# Patient Record
Sex: MALE | Race: WHITE | NOT HISPANIC OR LATINO | Employment: OTHER
[De-identification: names, ages, dates, MRNs, and addresses within clinical notes are randomized per-mention and may not be internally consistent; named-entity substitution may affect disease eponyms.]

---

## 2017-03-02 ENCOUNTER — RECORDS - HEALTHEAST (OUTPATIENT)
Dept: ADMINISTRATIVE | Facility: OTHER | Age: 72
End: 2017-03-02

## 2017-03-17 ENCOUNTER — OFFICE VISIT - HEALTHEAST (OUTPATIENT)
Dept: INTERNAL MEDICINE | Facility: CLINIC | Age: 72
End: 2017-03-17

## 2017-03-17 DIAGNOSIS — J01.90 ACUTE SINUSITIS: ICD-10-CM

## 2017-03-17 DIAGNOSIS — S63.501A WRIST SPRAIN, RIGHT, INITIAL ENCOUNTER: ICD-10-CM

## 2017-03-17 ASSESSMENT — MIFFLIN-ST. JEOR: SCORE: 1646.65

## 2017-03-23 ENCOUNTER — RECORDS - HEALTHEAST (OUTPATIENT)
Dept: ADMINISTRATIVE | Facility: OTHER | Age: 72
End: 2017-03-23

## 2017-05-25 ENCOUNTER — RECORDS - HEALTHEAST (OUTPATIENT)
Dept: ADMINISTRATIVE | Facility: OTHER | Age: 72
End: 2017-05-25

## 2017-07-10 ENCOUNTER — OFFICE VISIT - HEALTHEAST (OUTPATIENT)
Dept: FAMILY MEDICINE | Facility: CLINIC | Age: 72
End: 2017-07-10

## 2017-07-10 ENCOUNTER — OFFICE VISIT - HEALTHEAST (OUTPATIENT)
Dept: PODIATRY | Facility: CLINIC | Age: 72
End: 2017-07-10

## 2017-07-10 DIAGNOSIS — G58.9 COMPRESSION NEUROPATHY: ICD-10-CM

## 2017-07-10 DIAGNOSIS — H61.20 CERUMEN IMPACTION: ICD-10-CM

## 2017-07-10 DIAGNOSIS — G57.61 MORTON'S NEUROMA, RIGHT: ICD-10-CM

## 2017-08-22 ENCOUNTER — COMMUNICATION - HEALTHEAST (OUTPATIENT)
Dept: INTERNAL MEDICINE | Facility: CLINIC | Age: 72
End: 2017-08-22

## 2017-08-22 DIAGNOSIS — K21.9 GERD (GASTROESOPHAGEAL REFLUX DISEASE): ICD-10-CM

## 2017-10-04 ENCOUNTER — RECORDS - HEALTHEAST (OUTPATIENT)
Dept: ADMINISTRATIVE | Facility: OTHER | Age: 72
End: 2017-10-04

## 2017-10-23 ENCOUNTER — AMBULATORY - HEALTHEAST (OUTPATIENT)
Dept: SLEEP MEDICINE | Facility: CLINIC | Age: 72
End: 2017-10-23

## 2017-10-23 ENCOUNTER — OFFICE VISIT - HEALTHEAST (OUTPATIENT)
Dept: SLEEP MEDICINE | Facility: CLINIC | Age: 72
End: 2017-10-23

## 2017-10-23 DIAGNOSIS — G47.33 OSA ON CPAP: ICD-10-CM

## 2017-10-23 ASSESSMENT — MIFFLIN-ST. JEOR: SCORE: 1669.33

## 2017-10-30 ENCOUNTER — COMMUNICATION - HEALTHEAST (OUTPATIENT)
Dept: INTERNAL MEDICINE | Facility: CLINIC | Age: 72
End: 2017-10-30

## 2017-10-31 ENCOUNTER — OFFICE VISIT - HEALTHEAST (OUTPATIENT)
Dept: INTERNAL MEDICINE | Facility: CLINIC | Age: 72
End: 2017-10-31

## 2017-10-31 DIAGNOSIS — N20.0 NEPHROLITHIASIS: ICD-10-CM

## 2017-10-31 DIAGNOSIS — G47.33 OBSTRUCTIVE SLEEP APNEA SYNDROME: ICD-10-CM

## 2017-10-31 DIAGNOSIS — E78.2 MIXED HYPERLIPIDEMIA: ICD-10-CM

## 2017-10-31 LAB
CHOLEST SERPL-MCNC: 177 MG/DL
FASTING STATUS PATIENT QL REPORTED: YES
HDLC SERPL-MCNC: 47 MG/DL
LDLC SERPL CALC-MCNC: 110 MG/DL
TRIGL SERPL-MCNC: 98 MG/DL

## 2017-10-31 ASSESSMENT — MIFFLIN-ST. JEOR: SCORE: 1655.14

## 2018-01-04 ENCOUNTER — RECORDS - HEALTHEAST (OUTPATIENT)
Dept: ADMINISTRATIVE | Facility: OTHER | Age: 73
End: 2018-01-04

## 2018-01-31 ENCOUNTER — RECORDS - HEALTHEAST (OUTPATIENT)
Dept: ADMINISTRATIVE | Facility: OTHER | Age: 73
End: 2018-01-31

## 2018-02-08 ENCOUNTER — RECORDS - HEALTHEAST (OUTPATIENT)
Dept: ADMINISTRATIVE | Facility: OTHER | Age: 73
End: 2018-02-08

## 2018-02-19 ENCOUNTER — COMMUNICATION - HEALTHEAST (OUTPATIENT)
Dept: INTERNAL MEDICINE | Facility: CLINIC | Age: 73
End: 2018-02-19

## 2018-02-19 DIAGNOSIS — K21.9 GERD (GASTROESOPHAGEAL REFLUX DISEASE): ICD-10-CM

## 2018-07-27 ENCOUNTER — OFFICE VISIT - HEALTHEAST (OUTPATIENT)
Dept: FAMILY MEDICINE | Facility: CLINIC | Age: 73
End: 2018-07-27

## 2018-07-27 DIAGNOSIS — H61.23 BILATERAL IMPACTED CERUMEN: ICD-10-CM

## 2018-10-30 ENCOUNTER — RECORDS - HEALTHEAST (OUTPATIENT)
Dept: ADMINISTRATIVE | Facility: OTHER | Age: 73
End: 2018-10-30

## 2018-10-31 ENCOUNTER — RECORDS - HEALTHEAST (OUTPATIENT)
Dept: ADMINISTRATIVE | Facility: OTHER | Age: 73
End: 2018-10-31

## 2018-11-05 ENCOUNTER — COMMUNICATION - HEALTHEAST (OUTPATIENT)
Dept: GENERAL RADIOLOGY | Facility: CLINIC | Age: 73
End: 2018-11-05

## 2018-11-06 ENCOUNTER — COMMUNICATION - HEALTHEAST (OUTPATIENT)
Dept: INTERNAL MEDICINE | Facility: CLINIC | Age: 73
End: 2018-11-06

## 2018-11-06 ENCOUNTER — OFFICE VISIT - HEALTHEAST (OUTPATIENT)
Dept: INTERNAL MEDICINE | Facility: CLINIC | Age: 73
End: 2018-11-06

## 2018-11-06 DIAGNOSIS — E78.2 MIXED HYPERLIPIDEMIA: ICD-10-CM

## 2018-11-06 DIAGNOSIS — Z79.899 MEDICATION MANAGEMENT: ICD-10-CM

## 2018-11-06 DIAGNOSIS — G47.33 OBSTRUCTIVE SLEEP APNEA SYNDROME: ICD-10-CM

## 2018-11-06 DIAGNOSIS — Z12.11 SCREEN FOR COLON CANCER: ICD-10-CM

## 2018-11-06 DIAGNOSIS — K21.9 GASTROESOPHAGEAL REFLUX DISEASE WITHOUT ESOPHAGITIS: ICD-10-CM

## 2018-11-06 LAB
ALBUMIN SERPL-MCNC: 4 G/DL (ref 3.5–5)
ALP SERPL-CCNC: 48 U/L (ref 45–120)
ALT SERPL W P-5'-P-CCNC: 13 U/L (ref 0–45)
ANION GAP SERPL CALCULATED.3IONS-SCNC: 9 MMOL/L (ref 5–18)
AST SERPL W P-5'-P-CCNC: 19 U/L (ref 0–40)
BILIRUB SERPL-MCNC: 1 MG/DL (ref 0–1)
BUN SERPL-MCNC: 21 MG/DL (ref 8–28)
CALCIUM SERPL-MCNC: 9.5 MG/DL (ref 8.5–10.5)
CHLORIDE BLD-SCNC: 103 MMOL/L (ref 98–107)
CHOLEST SERPL-MCNC: 167 MG/DL
CO2 SERPL-SCNC: 26 MMOL/L (ref 22–31)
CREAT SERPL-MCNC: 0.9 MG/DL (ref 0.7–1.3)
ERYTHROCYTE [DISTWIDTH] IN BLOOD BY AUTOMATED COUNT: 12.2 % (ref 11–14.5)
FASTING STATUS PATIENT QL REPORTED: YES
GFR SERPL CREATININE-BSD FRML MDRD: >60 ML/MIN/1.73M2
GLUCOSE BLD-MCNC: 92 MG/DL (ref 70–125)
HCT VFR BLD AUTO: 44.7 % (ref 40–54)
HDLC SERPL-MCNC: 46 MG/DL
HGB BLD-MCNC: 15.2 G/DL (ref 14–18)
LDLC SERPL CALC-MCNC: 101 MG/DL
MCH RBC QN AUTO: 29.5 PG (ref 27–34)
MCHC RBC AUTO-ENTMCNC: 34 G/DL (ref 32–36)
MCV RBC AUTO: 87 FL (ref 80–100)
PLATELET # BLD AUTO: 182 THOU/UL (ref 140–440)
PMV BLD AUTO: 8.8 FL (ref 7–10)
POTASSIUM BLD-SCNC: 4.6 MMOL/L (ref 3.5–5)
PROT SERPL-MCNC: 7 G/DL (ref 6–8)
RBC # BLD AUTO: 5.15 MILL/UL (ref 4.4–6.2)
SODIUM SERPL-SCNC: 138 MMOL/L (ref 136–145)
TRIGL SERPL-MCNC: 101 MG/DL
WBC: 7.7 THOU/UL (ref 4–11)

## 2018-11-06 ASSESSMENT — MIFFLIN-ST. JEOR: SCORE: 1659.68

## 2018-11-16 ENCOUNTER — COMMUNICATION - HEALTHEAST (OUTPATIENT)
Dept: INTERNAL MEDICINE | Facility: CLINIC | Age: 73
End: 2018-11-16

## 2018-11-16 DIAGNOSIS — K21.9 GERD (GASTROESOPHAGEAL REFLUX DISEASE): ICD-10-CM

## 2018-11-26 ENCOUNTER — AMBULATORY - HEALTHEAST (OUTPATIENT)
Dept: SLEEP MEDICINE | Facility: CLINIC | Age: 73
End: 2018-11-26

## 2018-11-26 ENCOUNTER — OFFICE VISIT - HEALTHEAST (OUTPATIENT)
Dept: SLEEP MEDICINE | Facility: CLINIC | Age: 73
End: 2018-11-26

## 2018-11-26 DIAGNOSIS — G47.33 OSA ON CPAP: ICD-10-CM

## 2018-11-26 ASSESSMENT — MIFFLIN-ST. JEOR: SCORE: 1677.82

## 2019-02-06 ENCOUNTER — RECORDS - HEALTHEAST (OUTPATIENT)
Dept: ADMINISTRATIVE | Facility: OTHER | Age: 74
End: 2019-02-06

## 2019-02-14 ENCOUNTER — RECORDS - HEALTHEAST (OUTPATIENT)
Dept: ADMINISTRATIVE | Facility: OTHER | Age: 74
End: 2019-02-14

## 2019-03-14 ENCOUNTER — RECORDS - HEALTHEAST (OUTPATIENT)
Dept: ADMINISTRATIVE | Facility: OTHER | Age: 74
End: 2019-03-14

## 2019-04-11 ENCOUNTER — COMMUNICATION - HEALTHEAST (OUTPATIENT)
Dept: OTHER | Facility: CLINIC | Age: 74
End: 2019-04-11

## 2019-04-12 ENCOUNTER — RECORDS - HEALTHEAST (OUTPATIENT)
Dept: ADMINISTRATIVE | Facility: OTHER | Age: 74
End: 2019-04-12

## 2019-04-18 ENCOUNTER — RECORDS - HEALTHEAST (OUTPATIENT)
Dept: ADMINISTRATIVE | Facility: OTHER | Age: 74
End: 2019-04-18

## 2019-05-07 ENCOUNTER — COMMUNICATION - HEALTHEAST (OUTPATIENT)
Dept: SLEEP MEDICINE | Facility: CLINIC | Age: 74
End: 2019-05-07

## 2019-05-07 DIAGNOSIS — G47.33 OSA ON CPAP: ICD-10-CM

## 2019-05-16 ENCOUNTER — AMBULATORY - HEALTHEAST (OUTPATIENT)
Dept: SLEEP MEDICINE | Facility: CLINIC | Age: 74
End: 2019-05-16

## 2019-05-23 ENCOUNTER — COMMUNICATION - HEALTHEAST (OUTPATIENT)
Dept: SLEEP MEDICINE | Facility: CLINIC | Age: 74
End: 2019-05-23

## 2019-06-05 ENCOUNTER — AMBULATORY - HEALTHEAST (OUTPATIENT)
Dept: SLEEP MEDICINE | Facility: CLINIC | Age: 74
End: 2019-06-05

## 2019-06-28 ENCOUNTER — OFFICE VISIT - HEALTHEAST (OUTPATIENT)
Dept: INTERNAL MEDICINE | Facility: CLINIC | Age: 74
End: 2019-06-28

## 2019-06-28 DIAGNOSIS — A69.20 LYME DISEASE: ICD-10-CM

## 2019-06-28 DIAGNOSIS — R53.83 FATIGUE, UNSPECIFIED TYPE: ICD-10-CM

## 2019-06-28 DIAGNOSIS — T73.2XXA FATIGUE DUE TO EXPOSURE, INITIAL ENCOUNTER: ICD-10-CM

## 2019-06-28 LAB
ERYTHROCYTE [DISTWIDTH] IN BLOOD BY AUTOMATED COUNT: 12.5 % (ref 11–14.5)
ERYTHROCYTE [SEDIMENTATION RATE] IN BLOOD BY WESTERGREN METHOD: 14 MM/HR (ref 0–15)
HCT VFR BLD AUTO: 40.8 % (ref 40–54)
HGB BLD-MCNC: 14.1 G/DL (ref 14–18)
MCH RBC QN AUTO: 29.1 PG (ref 27–34)
MCHC RBC AUTO-ENTMCNC: 34.5 G/DL (ref 32–36)
MCV RBC AUTO: 84 FL (ref 80–100)
PLATELET # BLD AUTO: 200 THOU/UL (ref 140–440)
PMV BLD AUTO: 8.3 FL (ref 7–10)
RBC # BLD AUTO: 4.85 MILL/UL (ref 4.4–6.2)
TSH SERPL DL<=0.005 MIU/L-ACNC: 2.49 UIU/ML (ref 0.3–5)
WBC: 4.8 THOU/UL (ref 4–11)

## 2019-06-28 ASSESSMENT — MIFFLIN-ST. JEOR: SCORE: 1677.82

## 2019-07-01 LAB — B BURGDOR IGG+IGM SER QL: 0.1 INDEX VALUE

## 2019-07-02 ENCOUNTER — COMMUNICATION - HEALTHEAST (OUTPATIENT)
Dept: INTERNAL MEDICINE | Facility: CLINIC | Age: 74
End: 2019-07-02

## 2019-08-14 ENCOUNTER — COMMUNICATION - HEALTHEAST (OUTPATIENT)
Dept: INTERNAL MEDICINE | Facility: CLINIC | Age: 74
End: 2019-08-14

## 2019-08-14 DIAGNOSIS — K21.9 GERD (GASTROESOPHAGEAL REFLUX DISEASE): ICD-10-CM

## 2019-08-28 ENCOUNTER — OFFICE VISIT - HEALTHEAST (OUTPATIENT)
Dept: SLEEP MEDICINE | Facility: CLINIC | Age: 74
End: 2019-08-28

## 2019-08-28 DIAGNOSIS — G47.33 OSA ON CPAP: ICD-10-CM

## 2019-08-28 ASSESSMENT — MIFFLIN-ST. JEOR: SCORE: 1686.89

## 2019-09-24 ENCOUNTER — OFFICE VISIT - HEALTHEAST (OUTPATIENT)
Dept: INTERNAL MEDICINE | Facility: CLINIC | Age: 74
End: 2019-09-24

## 2019-09-24 ENCOUNTER — AMBULATORY - HEALTHEAST (OUTPATIENT)
Dept: INTERNAL MEDICINE | Facility: CLINIC | Age: 74
End: 2019-09-24

## 2019-09-24 DIAGNOSIS — H61.21 RIGHT EAR IMPACTED CERUMEN: ICD-10-CM

## 2019-09-24 DIAGNOSIS — H91.13 PRESBYCUSIS OF BOTH EARS: ICD-10-CM

## 2019-09-24 ASSESSMENT — MIFFLIN-ST. JEOR: SCORE: 1682.36

## 2019-10-17 ENCOUNTER — OFFICE VISIT - HEALTHEAST (OUTPATIENT)
Dept: AUDIOLOGY | Facility: CLINIC | Age: 74
End: 2019-10-17

## 2019-10-17 DIAGNOSIS — H93.13 TINNITUS OF BOTH EARS: ICD-10-CM

## 2019-10-17 DIAGNOSIS — H90.3 SENSORINEURAL HEARING LOSS, BILATERAL: ICD-10-CM

## 2019-11-06 ENCOUNTER — COMMUNICATION - HEALTHEAST (OUTPATIENT)
Dept: INTERNAL MEDICINE | Facility: CLINIC | Age: 74
End: 2019-11-06

## 2019-11-06 ENCOUNTER — OFFICE VISIT - HEALTHEAST (OUTPATIENT)
Dept: INTERNAL MEDICINE | Facility: CLINIC | Age: 74
End: 2019-11-06

## 2019-11-06 DIAGNOSIS — K21.9 GASTROESOPHAGEAL REFLUX DISEASE WITHOUT ESOPHAGITIS: ICD-10-CM

## 2019-11-06 DIAGNOSIS — E78.2 MIXED HYPERLIPIDEMIA: ICD-10-CM

## 2019-11-06 DIAGNOSIS — G47.33 OBSTRUCTIVE SLEEP APNEA SYNDROME: ICD-10-CM

## 2019-11-06 LAB
ALBUMIN SERPL-MCNC: 4.1 G/DL (ref 3.5–5)
ALP SERPL-CCNC: 52 U/L (ref 45–120)
ALT SERPL W P-5'-P-CCNC: 11 U/L (ref 0–45)
ANION GAP SERPL CALCULATED.3IONS-SCNC: 10 MMOL/L (ref 5–18)
AST SERPL W P-5'-P-CCNC: 21 U/L (ref 0–40)
BILIRUB SERPL-MCNC: 0.8 MG/DL (ref 0–1)
BUN SERPL-MCNC: 18 MG/DL (ref 8–28)
CALCIUM SERPL-MCNC: 9 MG/DL (ref 8.5–10.5)
CHLORIDE BLD-SCNC: 106 MMOL/L (ref 98–107)
CHOLEST SERPL-MCNC: 173 MG/DL
CO2 SERPL-SCNC: 25 MMOL/L (ref 22–31)
CREAT SERPL-MCNC: 1.02 MG/DL (ref 0.7–1.3)
ERYTHROCYTE [DISTWIDTH] IN BLOOD BY AUTOMATED COUNT: 12.9 % (ref 11–14.5)
FASTING STATUS PATIENT QL REPORTED: YES
GFR SERPL CREATININE-BSD FRML MDRD: >60 ML/MIN/1.73M2
GLUCOSE BLD-MCNC: 88 MG/DL (ref 70–125)
HCT VFR BLD AUTO: 43.7 % (ref 40–54)
HDLC SERPL-MCNC: 38 MG/DL
HGB BLD-MCNC: 14.8 G/DL (ref 14–18)
LDLC SERPL CALC-MCNC: 108 MG/DL
MCH RBC QN AUTO: 28.2 PG (ref 27–34)
MCHC RBC AUTO-ENTMCNC: 33.9 G/DL (ref 32–36)
MCV RBC AUTO: 83 FL (ref 80–100)
PLATELET # BLD AUTO: 186 THOU/UL (ref 140–440)
PMV BLD AUTO: 9 FL (ref 7–10)
POTASSIUM BLD-SCNC: 4.6 MMOL/L (ref 3.5–5)
PROT SERPL-MCNC: 7.4 G/DL (ref 6–8)
RBC # BLD AUTO: 5.25 MILL/UL (ref 4.4–6.2)
SODIUM SERPL-SCNC: 141 MMOL/L (ref 136–145)
TRIGL SERPL-MCNC: 137 MG/DL
WBC: 5 THOU/UL (ref 4–11)

## 2019-11-06 ASSESSMENT — MIFFLIN-ST. JEOR: SCORE: 1678.4

## 2019-12-10 ENCOUNTER — OFFICE VISIT - HEALTHEAST (OUTPATIENT)
Dept: INTERNAL MEDICINE | Facility: CLINIC | Age: 74
End: 2019-12-10

## 2019-12-10 DIAGNOSIS — G47.33 OSA ON CPAP: ICD-10-CM

## 2019-12-10 DIAGNOSIS — K21.9 GERD WITH STRICTURE: ICD-10-CM

## 2019-12-10 DIAGNOSIS — R39.11 BENIGN PROSTATIC HYPERPLASIA WITH URINARY HESITANCY: ICD-10-CM

## 2019-12-10 DIAGNOSIS — N40.1 BENIGN PROSTATIC HYPERPLASIA WITH URINARY HESITANCY: ICD-10-CM

## 2019-12-10 DIAGNOSIS — E78.5 DYSLIPIDEMIA: ICD-10-CM

## 2019-12-10 DIAGNOSIS — K22.2 GERD WITH STRICTURE: ICD-10-CM

## 2019-12-10 ASSESSMENT — MIFFLIN-ST. JEOR: SCORE: 1678.4

## 2020-02-06 ENCOUNTER — HOSPITAL ENCOUNTER (OUTPATIENT)
Dept: CT IMAGING | Facility: CLINIC | Age: 75
Discharge: HOME | End: 2020-02-06
Attending: INTERNAL MEDICINE

## 2020-02-06 ENCOUNTER — OFFICE VISIT - HEALTHEAST (OUTPATIENT)
Dept: INTERNAL MEDICINE | Facility: CLINIC | Age: 75
End: 2020-02-06

## 2020-02-06 ENCOUNTER — AMBULATORY - HEALTHEAST (OUTPATIENT)
Dept: INTERNAL MEDICINE | Facility: CLINIC | Age: 75
End: 2020-02-06

## 2020-02-06 ENCOUNTER — COMMUNICATION - HEALTHEAST (OUTPATIENT)
Dept: INTERNAL MEDICINE | Facility: CLINIC | Age: 75
End: 2020-02-06

## 2020-02-06 DIAGNOSIS — R10.9 FLANK PAIN: ICD-10-CM

## 2020-02-06 DIAGNOSIS — K21.9 GERD WITH STRICTURE: ICD-10-CM

## 2020-02-06 DIAGNOSIS — R10.11 ABDOMINAL PAIN, RIGHT UPPER QUADRANT: ICD-10-CM

## 2020-02-06 DIAGNOSIS — K22.2 GERD WITH STRICTURE: ICD-10-CM

## 2020-02-06 DIAGNOSIS — N20.0 CALCULUS OF KIDNEY: ICD-10-CM

## 2020-02-06 LAB
ALBUMIN SERPL-MCNC: 4.4 G/DL (ref 3.5–5)
ALBUMIN UR-MCNC: NEGATIVE MG/DL
ALP SERPL-CCNC: 56 U/L (ref 45–120)
ALT SERPL W P-5'-P-CCNC: 18 U/L (ref 0–45)
ANION GAP SERPL CALCULATED.3IONS-SCNC: 12 MMOL/L (ref 5–18)
APPEARANCE UR: CLEAR
AST SERPL W P-5'-P-CCNC: 28 U/L (ref 0–40)
BILIRUB SERPL-MCNC: 0.7 MG/DL (ref 0–1)
BILIRUB UR QL STRIP: NEGATIVE
BUN SERPL-MCNC: 17 MG/DL (ref 8–28)
CALCIUM SERPL-MCNC: 9.5 MG/DL (ref 8.5–10.5)
CHLORIDE BLD-SCNC: 104 MMOL/L (ref 98–107)
CO2 SERPL-SCNC: 24 MMOL/L (ref 22–31)
COLOR UR AUTO: YELLOW
CREAT SERPL-MCNC: 1.05 MG/DL (ref 0.7–1.3)
ERYTHROCYTE [DISTWIDTH] IN BLOOD BY AUTOMATED COUNT: 12.8 % (ref 11–14.5)
GFR SERPL CREATININE-BSD FRML MDRD: >60 ML/MIN/1.73M2
GLUCOSE BLD-MCNC: 97 MG/DL (ref 70–125)
GLUCOSE UR STRIP-MCNC: NEGATIVE MG/DL
HCT VFR BLD AUTO: 43.5 % (ref 40–54)
HGB BLD-MCNC: 15.2 G/DL (ref 14–18)
HGB UR QL STRIP: NEGATIVE
KETONES UR STRIP-MCNC: NEGATIVE MG/DL
LEUKOCYTE ESTERASE UR QL STRIP: NEGATIVE
MCH RBC QN AUTO: 29.7 PG (ref 27–34)
MCHC RBC AUTO-ENTMCNC: 34.8 G/DL (ref 32–36)
MCV RBC AUTO: 85 FL (ref 80–100)
NITRATE UR QL: NEGATIVE
PH UR STRIP: 6.5 [PH] (ref 5–8)
PLATELET # BLD AUTO: 190 THOU/UL (ref 140–440)
PMV BLD AUTO: 8.7 FL (ref 7–10)
POTASSIUM BLD-SCNC: 4.2 MMOL/L (ref 3.5–5)
PROT SERPL-MCNC: 7.6 G/DL (ref 6–8)
RBC # BLD AUTO: 5.11 MILL/UL (ref 4.4–6.2)
SODIUM SERPL-SCNC: 140 MMOL/L (ref 136–145)
SP GR UR STRIP: 1.02 (ref 1–1.03)
UROBILINOGEN UR STRIP-ACNC: NORMAL
WBC: 6.3 THOU/UL (ref 4–11)

## 2020-02-06 ASSESSMENT — MIFFLIN-ST. JEOR: SCORE: 1655.72

## 2020-03-02 ENCOUNTER — OFFICE VISIT - HEALTHEAST (OUTPATIENT)
Dept: INTERNAL MEDICINE | Facility: CLINIC | Age: 75
End: 2020-03-02

## 2020-03-02 DIAGNOSIS — Z86.0100 HISTORY OF COLONIC POLYPS: ICD-10-CM

## 2020-03-02 DIAGNOSIS — R10.9 FLANK PAIN: ICD-10-CM

## 2020-03-02 DIAGNOSIS — N20.0 CALCULUS OF KIDNEY: ICD-10-CM

## 2020-03-02 DIAGNOSIS — R19.4 CHANGE IN BOWEL HABIT: ICD-10-CM

## 2020-03-02 ASSESSMENT — MIFFLIN-ST. JEOR: SCORE: 1682.93

## 2020-03-04 ENCOUNTER — RECORDS - HEALTHEAST (OUTPATIENT)
Dept: ADMINISTRATIVE | Facility: OTHER | Age: 75
End: 2020-03-04

## 2020-03-12 ENCOUNTER — RECORDS - HEALTHEAST (OUTPATIENT)
Dept: ADMINISTRATIVE | Facility: OTHER | Age: 75
End: 2020-03-12

## 2020-05-21 ENCOUNTER — RECORDS - HEALTHEAST (OUTPATIENT)
Dept: ADMINISTRATIVE | Facility: OTHER | Age: 75
End: 2020-05-21

## 2020-06-09 ENCOUNTER — RECORDS - HEALTHEAST (OUTPATIENT)
Dept: ADMINISTRATIVE | Facility: OTHER | Age: 75
End: 2020-06-09

## 2020-07-20 ENCOUNTER — COMMUNICATION - HEALTHEAST (OUTPATIENT)
Dept: INTERNAL MEDICINE | Facility: CLINIC | Age: 75
End: 2020-07-20

## 2020-08-22 ENCOUNTER — COMMUNICATION - HEALTHEAST (OUTPATIENT)
Dept: INTERNAL MEDICINE | Facility: CLINIC | Age: 75
End: 2020-08-22

## 2020-08-22 DIAGNOSIS — K21.9 GERD (GASTROESOPHAGEAL REFLUX DISEASE): ICD-10-CM

## 2020-08-24 ENCOUNTER — COMMUNICATION - HEALTHEAST (OUTPATIENT)
Dept: INTERNAL MEDICINE | Facility: CLINIC | Age: 75
End: 2020-08-24

## 2020-08-24 DIAGNOSIS — K21.9 GERD (GASTROESOPHAGEAL REFLUX DISEASE): ICD-10-CM

## 2020-08-25 ENCOUNTER — COMMUNICATION - HEALTHEAST (OUTPATIENT)
Dept: INTERNAL MEDICINE | Facility: CLINIC | Age: 75
End: 2020-08-25

## 2020-08-25 DIAGNOSIS — K21.9 GERD (GASTROESOPHAGEAL REFLUX DISEASE): ICD-10-CM

## 2020-09-28 RX ORDER — TAMSULOSIN HYDROCHLORIDE 0.4 MG/1
0.4 CAPSULE ORAL AT BEDTIME
COMMUNITY
End: 2023-01-31

## 2020-09-28 NOTE — PROGRESS NOTES
"Rich Musa is a 75 year old male who is being evaluated via a billable video visit.      The patient has been notified of following:     \"This video visit will be conducted via a call between you and your physician/provider. We have found that certain health care needs can be provided without the need for an in-person physical exam.  This service lets us provide the care you need with a video conversation.  If a prescription is necessary we can send it directly to your pharmacy.  If lab work is needed we can place an order for that and you can then stop by our lab to have the test done at a later time.    Video visits are billed at different rates depending on your insurance coverage.  Please reach out to your insurance provider with any questions.    If during the course of the call the physician/provider feels a video visit is not appropriate, you will not be charged for this service.\"    Patient has given verbal consent for Video visit? Yes  How would you like to obtain your AVS? MyChart  If you are dropped from the video visit, the video invite should be resent to: Send to e-mail at: [email or web address]  Will anyone else be joining your video visit? No     Video-Visit Details    Type of service:  Video Visit    Originating Location (pt. Location): Home    Distant Location (provider location):  [facility]     Platform used for Video Visit: Mia Dolan CMA on 9/28/2020 at 2:25 PM    Thank you for the opportunity to participate in the care of Rich Musa.     He is a 75 year old y/o male patient who comes to the sleep medicine clinic for follow up.  Patient states that he continues to do well on CPAP therapy.  He would like to keep pressures of same.     Assessment and Plan:  In summary Rich Musa is a 75 year old year old male who is here for annual follow-up.    1. Obstructive sleep apnea  I congratulated patient on his excellent CPAP usage.  I will write " him a prescription so that he can continue to get supplies from [facility].  - COMPREHENSIVE DME       Compliance Download data for 30 days:  Pressure setting: APAP 13.5-15 CWP  95% pressure: 14.6 CWP  Residual AHI: 1.4 events per hour  Leak: Minimal  Compliance: 100%  Mask Tolerance: Good  Skin irritation: None  DME: [facility]    Sleep-Wake Cycle:    The patient likes to initiate sleep at around 10-10:30 PM with a sleep latency of 10-15 minutes. The patient has 0 nocturnal awakenings. Final wake up time is around 4-6 AM.    Past Medical History:   Diagnosis Date     BPH (benign prostatic hyperplasia)      BRYN (obstructive sleep apnea)        History reviewed. No pertinent surgical history.    Social History     Socioeconomic History     Marital status:      Spouse name: Not on file     Number of children: Not on file     Years of education: Not on file     Highest education level: Not on file   Occupational History     Not on file   Social Needs     Financial resource strain: Not on file     Food insecurity     Worry: Not on file     Inability: Not on file     Transportation needs     Medical: Not on file     Non-medical: Not on file   Tobacco Use     Smoking status: Never Smoker     Smokeless tobacco: Never Used   Substance and Sexual Activity     Alcohol use: Not on file     Drug use: Not on file     Sexual activity: Not on file   Lifestyle     Physical activity     Days per week: Not on file     Minutes per session: Not on file     Stress: Not on file   Relationships     Social connections     Talks on phone: Not on file     Gets together: Not on file     Attends Religion service: Not on file     Active member of club or organization: Not on file     Attends meetings of clubs or organizations: Not on file     Relationship status: Not on file     Intimate partner violence     Fear of current or ex partner: Not on file     Emotionally abused: Not on file     Physically abused: Not on file      Forced sexual activity: Not on file   Other Topics Concern     Not on file   Social History Narrative     Not on file       Current Outpatient Medications   Medication Sig Dispense Refill     Calcium Citrate-Vitamin D (CALCIUM CITRATE +D PO)        Multiple Vitamins-Minerals (MULTIVITAMIN ADULT PO) Take 1 tablet by mouth       omeprazole (PRILOSEC) 20 MG DR capsule        tamsulosin (FLOMAX) 0.4 MG capsule Take 0.4 mg by mouth         Allergies   Allergen Reactions     Sulfa Drugs        No flowsheet data found.    Physical Exam:  There were no vitals taken for this visit.  BMI:There is no height or weight on file to calculate BMI.   GEN: NAD,  Psych: normal mood, normal affect    Labs/Studies:    I reviewed the efficacy and compliance report from his device. Data summarized on the HPI and the PAP compliance flow sheet.     Patient verbalized understanding of these issues, agrees with the plan and all questions were answered today. Patient was given an opportuntity to voice any other symptoms or concerns not listed above. Patient did not have any other symptoms or concerns.      David Mathews DO  Board Certified in Internal Medicine and Sleep Medicine    (Note created with Dragon voice recognition and unintended spelling errors and word substitutions may occur)     I spent a total of 15 minutes of face-to-face encounter and in preparation for this clinic visit.    Audio and visual devices were used for this virtual clinic visit with permission from patient.

## 2020-09-29 ENCOUNTER — VIRTUAL VISIT (OUTPATIENT)
Dept: SLEEP MEDICINE | Facility: CLINIC | Age: 75
End: 2020-09-29
Payer: COMMERCIAL

## 2020-09-29 DIAGNOSIS — G47.33 OBSTRUCTIVE SLEEP APNEA: Primary | ICD-10-CM

## 2020-09-29 PROCEDURE — 99213 OFFICE O/P EST LOW 20 MIN: CPT | Mod: 95 | Performed by: INTERNAL MEDICINE

## 2020-10-05 ENCOUNTER — OFFICE VISIT - HEALTHEAST (OUTPATIENT)
Dept: INTERNAL MEDICINE | Facility: CLINIC | Age: 75
End: 2020-10-05

## 2020-10-05 DIAGNOSIS — H61.21 IMPACTED CERUMEN OF RIGHT EAR: ICD-10-CM

## 2020-10-05 ASSESSMENT — MIFFLIN-ST. JEOR: SCORE: 1655.72

## 2020-11-10 ENCOUNTER — OFFICE VISIT - HEALTHEAST (OUTPATIENT)
Dept: INTERNAL MEDICINE | Facility: CLINIC | Age: 75
End: 2020-11-10

## 2020-11-10 DIAGNOSIS — M72.2 PLANTAR FASCIITIS: ICD-10-CM

## 2020-11-10 DIAGNOSIS — M76.892 ENTHESOPATHY OF LEFT HIP REGION: ICD-10-CM

## 2020-11-10 DIAGNOSIS — Z00.00 ENCOUNTER FOR MEDICARE ANNUAL WELLNESS EXAM: ICD-10-CM

## 2020-11-10 DIAGNOSIS — G47.33 OSA ON CPAP: ICD-10-CM

## 2020-11-10 DIAGNOSIS — N40.1 BENIGN PROSTATIC HYPERPLASIA WITH URINARY HESITANCY: ICD-10-CM

## 2020-11-10 DIAGNOSIS — R39.11 BENIGN PROSTATIC HYPERPLASIA WITH URINARY HESITANCY: ICD-10-CM

## 2020-11-10 DIAGNOSIS — K22.2 GERD WITH STRICTURE: ICD-10-CM

## 2020-11-10 DIAGNOSIS — K21.9 GERD WITH STRICTURE: ICD-10-CM

## 2020-11-10 DIAGNOSIS — E78.5 DYSLIPIDEMIA: ICD-10-CM

## 2020-11-10 LAB
ALBUMIN SERPL-MCNC: 4.3 G/DL (ref 3.5–5)
ALBUMIN UR-MCNC: NEGATIVE MG/DL
ALP SERPL-CCNC: 52 U/L (ref 45–120)
ALT SERPL W P-5'-P-CCNC: 17 U/L (ref 0–45)
ANION GAP SERPL CALCULATED.3IONS-SCNC: 11 MMOL/L (ref 5–18)
APPEARANCE UR: CLEAR
AST SERPL W P-5'-P-CCNC: 24 U/L (ref 0–40)
BILIRUB SERPL-MCNC: 0.5 MG/DL (ref 0–1)
BILIRUB UR QL STRIP: NEGATIVE
BUN SERPL-MCNC: 16 MG/DL (ref 8–28)
CALCIUM SERPL-MCNC: 9.4 MG/DL (ref 8.5–10.5)
CHLORIDE BLD-SCNC: 105 MMOL/L (ref 98–107)
CHOLEST SERPL-MCNC: 175 MG/DL
CO2 SERPL-SCNC: 25 MMOL/L (ref 22–31)
COLOR UR AUTO: YELLOW
CREAT SERPL-MCNC: 1 MG/DL (ref 0.7–1.3)
ERYTHROCYTE [DISTWIDTH] IN BLOOD BY AUTOMATED COUNT: 12 % (ref 11–14.5)
FASTING STATUS PATIENT QL REPORTED: YES
GFR SERPL CREATININE-BSD FRML MDRD: >60 ML/MIN/1.73M2
GLUCOSE BLD-MCNC: 98 MG/DL (ref 70–125)
GLUCOSE UR STRIP-MCNC: NEGATIVE MG/DL
HCT VFR BLD AUTO: 45.1 % (ref 40–54)
HDLC SERPL-MCNC: 43 MG/DL
HGB BLD-MCNC: 15.4 G/DL (ref 14–18)
HGB UR QL STRIP: NEGATIVE
KETONES UR STRIP-MCNC: NEGATIVE MG/DL
LDLC SERPL CALC-MCNC: 108 MG/DL
LEUKOCYTE ESTERASE UR QL STRIP: NEGATIVE
MCH RBC QN AUTO: 29.8 PG (ref 27–34)
MCHC RBC AUTO-ENTMCNC: 34.1 G/DL (ref 32–36)
MCV RBC AUTO: 87 FL (ref 80–100)
NITRATE UR QL: NEGATIVE
PH UR STRIP: 7 [PH] (ref 5–8)
PLATELET # BLD AUTO: 179 THOU/UL (ref 140–440)
PMV BLD AUTO: 8.7 FL (ref 7–10)
POTASSIUM BLD-SCNC: 4.4 MMOL/L (ref 3.5–5)
PROT SERPL-MCNC: 7.6 G/DL (ref 6–8)
RBC # BLD AUTO: 5.16 MILL/UL (ref 4.4–6.2)
SODIUM SERPL-SCNC: 141 MMOL/L (ref 136–145)
SP GR UR STRIP: 1.02 (ref 1–1.03)
TRIGL SERPL-MCNC: 119 MG/DL
UROBILINOGEN UR STRIP-ACNC: NORMAL
WBC: 4.4 THOU/UL (ref 4–11)

## 2020-11-10 ASSESSMENT — ANXIETY QUESTIONNAIRES
2. NOT BEING ABLE TO STOP OR CONTROL WORRYING: NOT AT ALL
1. FEELING NERVOUS, ANXIOUS, OR ON EDGE: NOT AT ALL

## 2020-11-10 ASSESSMENT — MIFFLIN-ST. JEOR: SCORE: 1664.79

## 2020-11-12 ENCOUNTER — COMMUNICATION - HEALTHEAST (OUTPATIENT)
Dept: INTERNAL MEDICINE | Facility: CLINIC | Age: 75
End: 2020-11-12

## 2020-11-12 DIAGNOSIS — E78.00 HYPERCHOLESTEROLEMIA: ICD-10-CM

## 2020-11-12 DIAGNOSIS — E78.5 DYSLIPIDEMIA: ICD-10-CM

## 2020-11-22 ENCOUNTER — HEALTH MAINTENANCE LETTER (OUTPATIENT)
Age: 75
End: 2020-11-22

## 2021-02-02 ENCOUNTER — AMBULATORY - HEALTHEAST (OUTPATIENT)
Dept: INTERNAL MEDICINE | Facility: CLINIC | Age: 76
End: 2021-02-02

## 2021-02-02 ENCOUNTER — AMBULATORY - HEALTHEAST (OUTPATIENT)
Dept: LAB | Facility: CLINIC | Age: 76
End: 2021-02-02

## 2021-02-02 DIAGNOSIS — E78.00 HYPERCHOLESTEROLEMIA: ICD-10-CM

## 2021-02-02 DIAGNOSIS — E78.5 DYSLIPIDEMIA: ICD-10-CM

## 2021-02-02 LAB
CHOLEST SERPL-MCNC: 150 MG/DL
FASTING STATUS PATIENT QL REPORTED: YES
HDLC SERPL-MCNC: 42 MG/DL
LDLC SERPL CALC-MCNC: 80 MG/DL
TRIGL SERPL-MCNC: 141 MG/DL

## 2021-02-03 ENCOUNTER — COMMUNICATION - HEALTHEAST (OUTPATIENT)
Dept: INTERNAL MEDICINE | Facility: CLINIC | Age: 76
End: 2021-02-03

## 2021-03-18 ENCOUNTER — RECORDS - HEALTHEAST (OUTPATIENT)
Dept: ADMINISTRATIVE | Facility: OTHER | Age: 76
End: 2021-03-18

## 2021-04-02 ENCOUNTER — RECORDS - HEALTHEAST (OUTPATIENT)
Dept: ADMINISTRATIVE | Facility: OTHER | Age: 76
End: 2021-04-02

## 2021-04-28 ENCOUNTER — AMBULATORY - HEALTHEAST (OUTPATIENT)
Dept: INTERNAL MEDICINE | Facility: CLINIC | Age: 76
End: 2021-04-28

## 2021-04-28 DIAGNOSIS — Z20.822 COVID-19 RULED OUT: ICD-10-CM

## 2021-04-30 ENCOUNTER — AMBULATORY - HEALTHEAST (OUTPATIENT)
Dept: LAB | Facility: CLINIC | Age: 76
End: 2021-04-30

## 2021-04-30 DIAGNOSIS — Z20.822 COVID-19 RULED OUT: ICD-10-CM

## 2021-05-01 LAB
SARS-COV-2 PCR COMMENT: NORMAL
SARS-COV-2 RNA SPEC QL NAA+PROBE: NEGATIVE
SARS-COV-2 VIRUS SPECIMEN SOURCE: NORMAL

## 2021-05-02 ENCOUNTER — COMMUNICATION - HEALTHEAST (OUTPATIENT)
Dept: SCHEDULING | Facility: CLINIC | Age: 76
End: 2021-05-02

## 2021-05-03 ENCOUNTER — COMMUNICATION - HEALTHEAST (OUTPATIENT)
Dept: INTERNAL MEDICINE | Facility: CLINIC | Age: 76
End: 2021-05-03

## 2021-05-24 ENCOUNTER — RECORDS - HEALTHEAST (OUTPATIENT)
Dept: ADMINISTRATIVE | Facility: OTHER | Age: 76
End: 2021-05-24

## 2021-05-27 NOTE — TELEPHONE ENCOUNTER
Patient had initiated transfer to [facility] from [facility] for his treatment of BRYN with a Cpap machine and supplies. He previously had an office visit with Dr Dave in November 2018 and now would like a new Cpap machine when he is eligible on 5/9/2019. I messaged Dr. Reyes to see if he recommended the patient to be seen again to get an updated prescription. Per Dr. Reyes when the patient is ready to receive the new machine he can call and request a prescription to be written by him. He will need the pressures and mask interface. I let the patient know this and he understood. He will call when he gets back from vacation in the middle of May. Per Dr. Dave's notes from his last visit patients pressures are 13.5 - 15 cm H2O. I confirmed with the patient that those are still correct. He is also using a Full Face mask (Mirage Quattro, Medium). Once [facility] receives the new prescription we will contact the patient to be set up on a new machine.

## 2021-05-28 NOTE — TELEPHONE ENCOUNTER
Attempted to reach Rich today 5/16/2019. No answer.   Left detailed message letting him know that [facility] now has his replacement device order.

## 2021-05-28 NOTE — TELEPHONE ENCOUNTER
Please inform patient and [facility] that replacement device order is now in.  Shad Reyes MD  5:18 PM, 5/15/2019

## 2021-05-28 NOTE — TELEPHONE ENCOUNTER
Last visit: 11/26/18, was advised to return in 1 year. Please review and advise.    Dulce Chambers CMA 5/8/2019 11:24 AM

## 2021-05-28 NOTE — PROGRESS NOTES
Order for Durable Medical Equipment was processed and equipment ordered.     DME provider: [facility]    Date Faxed: 5/16/2019    Ordering Provider: Shad Reyes MD    PAP Order Type: Replacement Device Order    Fax Number: IN HOUSE DME: [facility]

## 2021-05-28 NOTE — TELEPHONE ENCOUNTER
Dr Reyes    Patient had office visit with Dr Dave in November 2018.      Patient would like to replace current CPAP machine soon.    Patient requests new script for CPAP.   Patient DME is now [facility].    Thank you.

## 2021-05-29 NOTE — PROGRESS NOTES
Patient was offered choice of vendor and chose [facility]ending sale to [facility].  Patient Rich Musa was set up at [facility] on 6/5/19. Patient received a Resmed [id number] Auto. Pressures were set at 13.5-15.   Patient s ramp is 6 cm H2O for off and FLEX/EPR is EPR 2.  Patient received a Resmed Mask name: Airfit F30  FFM Size med, heated tubing and heated humidifier.    Jayee Her

## 2021-05-29 NOTE — TELEPHONE ENCOUNTER
Please fax this patient's CPAP Rx to the DME below:  DME: [facility]  Need New Supplies Or A New Machine? New machine    Rich states that [facility] hasn't received this Rx yet.     Thank you.

## 2021-05-30 VITALS — BODY MASS INDEX: 29.92 KG/M2 | HEIGHT: 69 IN | WEIGHT: 202 LBS

## 2021-05-30 NOTE — PROGRESS NOTES
"  Office Visit - Follow Up   Rich Musa   74 y.o. male    Date of Visit: 6/28/2019    Chief Complaint   Patient presents with     Generalized Body Aches     Body aches x 3 weeks, just not feeling good, feverish, knees ache, concerned about Lymes disease        Assessment and Plan   1. Lyme disease    - HM2(CBC w/o Differential)  - Erythrocyte Sedimentation Rate  - Lyme Antibody Cascade    2. Fatigue due to exposure, initial encounter    - HM2(CBC w/o Differential)  - Erythrocyte Sedimentation Rate  - Lyme Antibody Cascade    3. Fatigue, unspecified type  I told him we should check these laboratory studies and exclude any active Lyme disease if we can.  He is had no tick bite exposure.  In 2014 he also had no known exposure but had serologically suggestive Lyme's disease.  - Thyroid Stimulating Hormone (TSH)          No follow-ups on file.     History of Present Illness   This 74 y.o. old comes in with a 4-week history of feeling achy and feverish.  He has not checked his temperature.  He reports no serious chills.  He reports that his joints diffusely and he has had some headaches.  His knees were aching quite a bit not taking much at all.  He is in his backyard possibly exposed to ticks but has not had any known tick bite.  He has not pulled any ticks off of his body yet.  He does however report that in 2014 he had Lyme's disease.  I reviewed these records and he had a story suggestive of Lyme's but no ECM rash.  His serology was borderline positive for active Lyme disease.    Review of Systems: A comprehensive review of systems was negative except as noted.     Medications, Allergies and Problem List   Reviewed, reconciled and updated  Post Discharge Medication Reconciliation Status:      Physical Exam   General Appearance:       /70 (Patient Site: Left Arm, Patient Position: Sitting, Cuff Size: Adult Large)   Pulse 82   Ht 5' 9.25\" (1.759 m)   Wt 208 lb (94.3 kg)   SpO2 93%   BMI 30.49 kg/m  "     His hands and knees and ankles have no obvious synovitis.  His skin shows no rash.     Additional Information   Current Outpatient Medications   Medication Sig Dispense Refill     multivit with minerals/lutein (MULTIVITAMIN 50 PLUS ORAL) Take 1 tablet by mouth daily.       MULTIVITAMIN ORAL Take 1 tablet by mouth daily. Tablet.       omeprazole (PRILOSEC) 20 MG capsule Take 1 capsule (20 mg total) by mouth daily. 90 capsule 2     tamsulosin (FLOMAX) 0.4 mg Cp24 Take 0.4 mg by mouth.       No current facility-administered medications for this visit.      Allergies   Allergen Reactions     Augmentin [Amoxicillin-Pot Clavulanate] Diarrhea     Codeine-Guaifenesin      Sulfa (Sulfonamide Antibiotics)      Social History     Tobacco Use     Smoking status: Never Smoker     Smokeless tobacco: Never Used   Substance Use Topics     Alcohol use: No     Comment: social     Drug use: No       Review and/or order of clinical lab tests:  Review and/or order of radiology tests:  Review and/or order of medicine tests:  Discussion of test results with performing physician:  Decision to obtain old records and/or obtain history from someone other than the patient:  Review and summarization of old records and/or obtaining history from someone other than the patient and.or discussion of case with another health care provider:  Independent visualization of image, tracing or specimen itself:    Time: total time spent with the patient was 15 minutes of which >50% was spent in counseling and coordination of care     Ryan Santacruz MD

## 2021-05-31 ENCOUNTER — RECORDS - HEALTHEAST (OUTPATIENT)
Dept: ADMINISTRATIVE | Facility: CLINIC | Age: 76
End: 2021-05-31

## 2021-05-31 VITALS — HEIGHT: 69 IN | WEIGHT: 203 LBS | BODY MASS INDEX: 30.07 KG/M2

## 2021-05-31 VITALS — BODY MASS INDEX: 29.95 KG/M2 | WEIGHT: 202.8 LBS

## 2021-05-31 VITALS — WEIGHT: 207 LBS | HEIGHT: 69 IN | BODY MASS INDEX: 30.66 KG/M2

## 2021-05-31 NOTE — PROGRESS NOTES
"He is a 74 y.o. y/o male patient who comes to the sleep medicine clinic for PAP therapy follow up.    [facility], DME: [facility]    He was diagnosed with severe BRYN (AHI 32/hr on 2005 PSG) and has been using nPAP therapy.  Most recent PAP settings: 13.5 cmH2O - 15 cmH2O withFFM    His symptoms are improved since he started using CPAP. He feels more refreshed when he wakes up.    He denies any PAP intolerance. He is using the device nightly and tolerates the pressure well.     30-Days Efficacy and Compliance Data  Residual AHI: 1.1/hr  Leak: 0 sec large leak  Days used > 4 hours: 30/30  Average usage per night: 6:31.5 hours  90th percentile P: 14.4 cmH2O  Mask Tolerance: Hated the F30.  Using old mask and awaiting fitting appt.  Skin irritation: None    Physical Exam:  /68 (Patient Site: Right Arm, Patient Position: Sitting, Cuff Size: Adult Regular)   Pulse 82   Ht 5' 9.25\" (1.759 m)   Wt 210 lb (95.3 kg)   SpO2 96%   BMI 30.79 kg/m    General appearance: No apparent distress, well groomed.  Head: Normocephalic, atraumatic.  Musculoskeletal: No edema noted.  No clubbing or cyanosis.  Skin: Warm, dry, intact.  Neurologic: Alert and oriented to person, place and time   Gait is normal.  Psychiatric: Affect pleasant.  Mood good.     Labs/Studies:  I reviewed the efficacy and compliance report from his device. Data summarized on the HPI.     Assessment and Plan:  1. Severe Obstructive Sleep Apnea.  Residual AHI is good  Compliance is good  Patient is benefiting from using PAP therapy.    Continue with P = same.    We counseled the patient on the importance of using his PAP device every night and the risks of not treating sleep apnea.      Patient verbalized understanding of these issues, agrees with the plan and all questions were answered today. Patient was given an opportuntity to voice any other symptoms or concerns not listed above. Patient did not have any other symptoms or concerns.    "   Patient told to return in 12 - 24 months.     Shad ENRIQUE Board Certified in Internal Medicine and Sleep Medicine  [facility].    We spent a total of 15 minutes of face-to-face encounter and more than 50% of the encounter was used for counseling or coordination of care.

## 2021-05-31 NOTE — TELEPHONE ENCOUNTER
Refill Approved    Rx renewed per Medication Renewal Policy. Medication was last renewed on 11/16/2018with 2 refills.  Last office visit: 6/28/2019 with PCP Dr ALEXIS Renee, [facility] Triage/Med Refill 8/14/2019     Requested Prescriptions   Pending Prescriptions Disp Refills     omeprazole (PRILOSEC) 20 MG capsule [Pharmacy Med Name: Omeprazole Oral Capsule Delayed Release 20 MG] 90 capsule 1     Sig: TAKE 1 CAPSULE (20 MG TOTAL) BY MOUTH DAILY.       GI Medications Refill Protocol Passed - 8/14/2019  6:15 AM        Passed - PCP or prescribing provider visit in last 12 or next 3 months.     Last office visit with prescriber/PCP: 6/28/2019 Ryan Santacruz MD OR same dept: 6/28/2019 Ryan Santacruz MD OR same specialty: 6/28/2019 Ryan Santacruz MD  Last physical: 11/6/2018 Last MTM visit: Visit date not found   Next visit within 3 mo: Visit date not found  Next physical within 3 mo: Visit date not found  Prescriber OR PCP: Ryan Santacruz MD  Last diagnosis associated with med order: 1. GERD (gastroesophageal reflux disease)  - omeprazole (PRILOSEC) 20 MG capsule [Pharmacy Med Name: Omeprazole Oral Capsule Delayed Release 20 MG]; Take 1 capsule (20 mg total) by mouth daily.  Dispense: 90 capsule; Refill: 1    If protocol passes may refill for 12 months if within 3 months of last provider visit (or a total of 15 months).

## 2021-06-01 VITALS — WEIGHT: 208.4 LBS | BODY MASS INDEX: 30.55 KG/M2

## 2021-06-01 NOTE — PROGRESS NOTES
"  Office Visit - Follow Up   Rich Musa   74 y.o. male    Date of Visit: 9/24/2019    Chief Complaint   Patient presents with     Cerumen Impaction     Left ear feel plugged, not painful        Assessment and Plan   1. Presbycusis of both ears  Cerumen removed from right ear with improvement in hearing.  Still has residual diminished hearing in both ears.  Would like an audiology check.  - Ambulatory referral to Audiology    2. Right ear impacted cerumen  Extensive irrigation of his right ear with complete removal of cerumen at the completion of the visit.  He has an extensive amount of hair in his external canal making this a difficult job.          No follow-ups on file.     History of Present Illness   This 74 y.o. old complains that he has had diminished hearing over recent months but nothing severe.  His wife would like to have him get this checked.  He now complains that over the last month or so he has had more prominent and diminished hearing in his right ear and much less so in the left.  He is had trouble with cerumen impactions in the past.    Review of Systems: A comprehensive review of systems was negative except as noted.     Medications, Allergies and Problem List   Reviewed, reconciled and updated  Post Discharge Medication Reconciliation Status:      Physical Exam   General Appearance:      /80 (Patient Site: Right Arm, Patient Position: Sitting, Cuff Size: Adult Large)   Pulse 79   Ht 5' 9.25\" (1.759 m)   Wt 209 lb (94.8 kg)   SpO2 94%   BMI 30.64 kg/m      Left ear has a modest amount of external canal hair but no cerumen impaction.  Right ear is impacted with cerumen.  He has complaints of diminished hearing in both ears  Procedure: After verbal informed consent I copiously irrigated his right ear.  This was difficult to completely clear.  With assist of cerumen spoon I was able to remove all of the wax in his right ear revealing a normal tympanic membrane.  He has a great deal of " hair in his external canal of his ear making this irrigation difficult.     Additional Information   Current Outpatient Medications   Medication Sig Dispense Refill     multivit with minerals/lutein (MULTIVITAMIN 50 PLUS ORAL) Take 1 tablet by mouth daily.       omeprazole (PRILOSEC) 20 MG capsule Take 1 capsule (20 mg total) by mouth daily. 90 capsule 3     tamsulosin (FLOMAX) 0.4 mg Cp24 Take 0.4 mg by mouth.       No current facility-administered medications for this visit.      Allergies   Allergen Reactions     Augmentin [Amoxicillin-Pot Clavulanate] Diarrhea     Codeine-Guaifenesin      Sulfa (Sulfonamide Antibiotics)      Social History     Tobacco Use     Smoking status: Never Smoker     Smokeless tobacco: Never Used   Substance Use Topics     Alcohol use: No     Comment: social     Drug use: No       Review and/or order of clinical lab tests:  Review and/or order of radiology tests:  Review and/or order of medicine tests:  Discussion of test results with performing physician:  Decision to obtain old records and/or obtain history from someone other than the patient:  Review and summarization of old records and/or obtaining history from someone other than the patient and.or discussion of case with another health care provider:  Independent visualization of image, tracing or specimen itself:    Time: total time spent with the patient was 15 minutes of which >50% was spent in counseling and coordination of care     Ryan Santacruz MD

## 2021-06-02 VITALS — BODY MASS INDEX: 30.81 KG/M2 | HEIGHT: 69 IN | WEIGHT: 208 LBS

## 2021-06-02 VITALS — BODY MASS INDEX: 30.21 KG/M2 | WEIGHT: 204 LBS | HEIGHT: 69 IN

## 2021-06-03 VITALS
SYSTOLIC BLOOD PRESSURE: 128 MMHG | DIASTOLIC BLOOD PRESSURE: 80 MMHG | WEIGHT: 209 LBS | BODY MASS INDEX: 30.96 KG/M2 | OXYGEN SATURATION: 94 % | HEART RATE: 79 BPM | HEIGHT: 69 IN

## 2021-06-03 VITALS — WEIGHT: 210 LBS | BODY MASS INDEX: 31.1 KG/M2 | HEIGHT: 69 IN

## 2021-06-03 VITALS
HEART RATE: 64 BPM | BODY MASS INDEX: 30.96 KG/M2 | WEIGHT: 209 LBS | OXYGEN SATURATION: 97 % | DIASTOLIC BLOOD PRESSURE: 80 MMHG | SYSTOLIC BLOOD PRESSURE: 138 MMHG | HEIGHT: 69 IN

## 2021-06-03 VITALS — HEIGHT: 69 IN | WEIGHT: 208 LBS | BODY MASS INDEX: 30.81 KG/M2

## 2021-06-03 NOTE — PROGRESS NOTES
Assessment and Plan:   74-year-old gentleman comes in for annual wellness visit.    1. Mixed hyperlipidemia  - Comprehensive Metabolic Panel  - Lipid Cascade    2. Gastroesophageal reflux disease without esophagitis  He is on Prilosec 20 mg daily.  I suggested he could try H2 blocker if he would like.  - HM2(CBC w/o Differential)    3. Obstructive sleep apnea syndrome  Uses his CPAP machine every night.    The patient's current medical problems were reviewed.  He plans on seeing Dr. Hawley in the future.  I have had an Advance Directives discussion with the patient.  His healthcare directive is on file  The following health maintenance schedule was reviewed with the patient and provided in printed form in the after visit summary:   Health Maintenance   Topic Date Due     HEPATITIS C SCREENING  1945     PNEUMOCOCCAL IMMUNIZATION 65+ LOW/MEDIUM RISK (2 of 2 - PPSV23) 04/08/2016     INFLUENZA VACCINE RULE BASED (1) 08/01/2019     TD 18+ HE  09/09/2019     MEDICARE ANNUAL WELLNESS VISIT  11/06/2020     FALL RISK ASSESSMENT  11/06/2020     ADVANCE CARE PLANNING  11/06/2023     COLONOSCOPY  05/22/2024     ZOSTER VACCINES  Completed        Subjective:   Chief Complaint: Rich Musa is an 74 y.o. male here for an Annual Wellness visit.   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  He is up-to-date on his immunizations.  He could get a tetanus/diphtheria shot at his local pharmacy.  This was discussed.  Next colonoscopy due in 2024.  He is followed by Dr. Shaan Grewal for his BPH.  He is on Flomax.  This occasionally works some.  He and his urologist are not ready to do surgery yet.  He had a bout of Lyme disease 6/2019 treated with doxycycline without any residual effect.  He recently saw an audiologist and got a formal hearing test.  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  He has typical loss of high-frequency hearing consistent with his age.  He is not ready for  hearing aids at this time.  Review of Systems:   Please see above.  The rest of the review of systems are negative for all systems.    Patient Care Team:  Ryan Santacruz MD as PCP - General  Ryan Santacruz MD as Assigned PCP     Patient Active Problem List   Diagnosis     Mixed hyperlipidemia     Esophageal Reflux     Adult Sleep Apnea     Seborrheic Dermatitis     Seborrheic Keratosis     Nephrolithiasis     Trochanteric Bursitis     Lyme Disease     Bacterial Pneumonia     Arthralgia of temporomandibular joint     Articular disc disorder of temporomandibular joint     Sprain of jaw     Myofascial pain     No past medical history on file.   Past Surgical History:   Procedure Laterality Date     IL KNEE SCOPE,DIAGNOSTIC      Description: Arthroscopy Knee Right;  Recorded: 2011;      Family History   Problem Relation Age of Onset     Diabetes Mother      Breast cancer Mother      Diabetes Father      Kidney disease Father          age 52 nephropathy     Diabetes Sister      Cancer Brother         bladder      Social History     Socioeconomic History     Marital status:      Spouse name: Not on file     Number of children: Not on file     Years of education: Not on file     Highest education level: Not on file   Occupational History     Not on file   Social Needs     Financial resource strain: Not on file     Food insecurity:     Worry: Not on file     Inability: Not on file     Transportation needs:     Medical: Not on file     Non-medical: Not on file   Tobacco Use     Smoking status: Never Smoker     Smokeless tobacco: Never Used   Substance and Sexual Activity     Alcohol use: No     Comment: social     Drug use: No     Sexual activity: Not on file   Lifestyle     Physical activity:     Days per week: Not on file     Minutes per session: Not on file     Stress: Not on file   Relationships     Social connections:     Talks on phone: Not on file     Gets together: Not on file      "Attends Rastafari service: Not on file     Active member of club or organization: Not on file     Attends meetings of clubs or organizations: Not on file     Relationship status: Not on file     Intimate partner violence:     Fear of current or ex partner: Not on file     Emotionally abused: Not on file     Physically abused: Not on file     Forced sexual activity: Not on file   Other Topics Concern     Not on file   Social History Narrative     Not on file      Current Outpatient Medications   Medication Sig Dispense Refill     multivit with minerals/lutein (MULTIVITAMIN 50 PLUS ORAL) Take 1 tablet by mouth daily.       omeprazole (PRILOSEC) 20 MG capsule Take 1 capsule (20 mg total) by mouth daily. 90 capsule 3     tamsulosin (FLOMAX) 0.4 mg Cp24 Take 0.4 mg by mouth.       No current facility-administered medications for this visit.       Objective:   Vital Signs:   Visit Vitals  /80 (Patient Site: Right Arm, Patient Position: Sitting, Cuff Size: Adult Large)   Pulse 64   Ht 5' 9\" (1.753 m)   Wt 209 lb (94.8 kg)   SpO2 97%   BMI 30.86 kg/m         VisionScreening:  No exam data present     PHYSICAL EXAM  /80 (Patient Site: Right Arm, Patient Position: Sitting, Cuff Size: Adult Large)   Pulse 64   Ht 5' 9\" (1.753 m)   Wt 209 lb (94.8 kg)   SpO2 97%   BMI 30.86 kg/m      General Appearance:    Alert, cooperative, no distress, appears stated age   Head:    Normocephalic, without obvious abnormality, atraumatic   Eyes:    PERRL, conjunctiva/corneas clear, EOM's intact, fundi     benign, both eyes        Ears:    Normal TM's and external ear canals, both ears   Nose:   Nares normal, septum midline, mucosa normal, no drainage    or sinus tenderness   Throat:   Lips, mucosa, and tongue normal; teeth and gums normal   Neck:   Supple, symmetrical, trachea midline, no adenopathy;        thyroid:  No enlargement/tenderness/nodules; no carotid    bruit or JVD   Back:     Symmetric, no curvature, ROM normal, " no CVA tenderness   Lungs:     Clear to auscultation bilaterally, respirations unlabored   Chest wall:    No tenderness or deformity   Heart:    Regular rate and rhythm, S1 and S2 normal, no murmur, rub    or gallop   Abdomen:     Soft, non-tender, bowel sounds active all four quadrants,     no masses, no organomegaly   Genitalia:     Rectal:     Extremities:   Extremities normal, atraumatic, no cyanosis or edema   Pulses:   2+ and symmetric all extremities   Skin:   Skin color, texture, turgor normal, no rashes or lesions   Lymph nodes:   Cervical, supraclavicular, and axillary nodes normal   Neurologic:   CNII-XII intact. Normal strength, sensation and reflexes       throughout         Assessment Results 11/6/2019   Activities of Daily Living No help needed   Instrumental Activities of Daily Living No help needed   Get Up and Go Score Less than 12 seconds   Mini Cog Total Score 4   Some recent data might be hidden     A Mini-Cog score of 0-2 suggests the possibility of dementia, score of 3-5 suggests no dementia    Identified Health Risks:     The patient was provided with written information regarding signs of hearing loss.  Patient's advanced directive was discussed and I am comfortable with the patient's wishes.

## 2021-06-04 VITALS
DIASTOLIC BLOOD PRESSURE: 78 MMHG | WEIGHT: 209 LBS | HEIGHT: 69 IN | OXYGEN SATURATION: 97 % | BODY MASS INDEX: 30.96 KG/M2 | HEART RATE: 66 BPM | SYSTOLIC BLOOD PRESSURE: 122 MMHG

## 2021-06-04 VITALS
WEIGHT: 204 LBS | HEART RATE: 78 BPM | SYSTOLIC BLOOD PRESSURE: 124 MMHG | HEIGHT: 69 IN | DIASTOLIC BLOOD PRESSURE: 80 MMHG | BODY MASS INDEX: 30.21 KG/M2 | OXYGEN SATURATION: 95 %

## 2021-06-04 VITALS
WEIGHT: 210 LBS | OXYGEN SATURATION: 96 % | BODY MASS INDEX: 31.1 KG/M2 | DIASTOLIC BLOOD PRESSURE: 80 MMHG | HEIGHT: 69 IN | SYSTOLIC BLOOD PRESSURE: 130 MMHG | HEART RATE: 66 BPM

## 2021-06-04 NOTE — PROGRESS NOTES
Office Visit - Follow Up   Rich Musa   74 y.o. male    Date of Visit: 12/10/2019    Chief Complaint   Patient presents with     Establish Care     changing care over         Assessment and Plan   1. GERD with stricture  I counseled patient at length on the controversies of long-term PPI use.  Given his history of severe reflux with stricture he is going to continue with the omeprazole.  I counseled him on calcium intake today.  We will continue with calcium citrate.  He should also try to obtain calcium through his diet.  Stay active for his bones.  Would like to probably get a bone density at some point if his insurance will cover.    2. Dyslipidemia  Likely would be a candidate for statin therapy.  He will work on lifestyle modification.  Aerobic exercise urged.    3. BRYN on CPAP  He is compliant with CPAP.  Continue same.    4. Benign prostatic hyperplasia with urinary hesitancy  Doing well with Flomax.  Continue same.    40 minutes was spent with patient.  Over half was spent counseling regarding his issues as well as reviewing his history and establishing care.    The following high BMI interventions were performed this visit: encouragement to exercise    Return in about 1 year (around 12/10/2020) for Annual physical.     History of Present Illness   This 74 y.o. old Khadar comes in today to establish care.  Pleasant patient formally followed by Dr. Neeta todd.  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  He has been retired about 10 years now.  [place]  Went to [employer] where he played hockey and got his degree.  Wife is also retired teacher.  They have 2 children and 6 grandchildren.  Son lives in [place] and is a  and daughter is a .  He has been relatively healthy.  He has dyslipidemia for which she is on lifestyle modification.  He has sleep apnea for which he uses a CPAP and  "is compliant with.  He has longstanding GERD with stricture.  He has had a couple different dilatations.  Since being on omeprazole he is not had to have any EGDs and his swallowing is been good.  He did discuss coming off omeprazole and transition to H2 blocker with Dr. Santacruz recently but patient himself is concerned to do that and is decided to stay on omeprazole.  He is aware of the potential long-term side effects of this medication.  He has a remote history of Lyme disease and he has BPH which is well controlled with Flomax.  He offers no new somatic concerns for me today.    He is not very close with his siblings.  He has a sister with diabetes and heart disease.  He is a brother who has polio and bladder cancer and lives in [place].  Another sister has had knee replacement and I believe lives close by.  Father  at age 52 of kidney failure due to diabetes that was poorly treated.  Mother  with dementia at age 98.  She also had breast cancer in her old age.  He is a social drinker and non-smoker.  Exercises several days a week.  Social with friends and goes to [employer].    Review of Systems: A comprehensive review of systems was negative except as noted.     Medications, Allergies and Problem List   Reviewed, reconciled and updated  Post Discharge Medication Reconciliation Status:      Physical Exam   General Appearance:   Pleasant gentleman who appears younger than his age.  Vitals noted.  Good spirits.  Further exam deferred.    /78 (Patient Site: Right Arm, Patient Position: Sitting, Cuff Size: Adult Regular)   Pulse 66   Ht 5' 9\" (1.753 m)   Wt 209 lb (94.8 kg)   SpO2 97%   BMI 30.86 kg/m           Additional Information   Current Outpatient Medications   Medication Sig Dispense Refill     multivit with minerals/lutein (MULTIVITAMIN 50 PLUS ORAL) Take 1 tablet by mouth daily.       omeprazole (PRILOSEC) 20 MG capsule Take 1 capsule (20 mg total) by mouth daily. 90 capsule 3     " tamsulosin (FLOMAX) 0.4 mg Cp24 Take 0.4 mg by mouth.       No current facility-administered medications for this visit.      Allergies   Allergen Reactions     Sulfa (Sulfonamide Antibiotics)      Social History     Tobacco Use     Smoking status: Never Smoker     Smokeless tobacco: Never Used   Substance Use Topics     Alcohol use: No     Comment: social     Drug use: No       Review and/or order of clinical lab tests:  Review and/or order of radiology tests:  Review and/or order of medicine tests:  Discussion of test results with performing physician:  Decision to obtain old records and/or obtain history from someone other than the patient:  Review and summarization of old records and/or obtaining history from someone other than the patient and.or discussion of case with another health care provider:  Independent visualization of image, tracing or specimen itself:    Time: total time spent with the patient was 40 minutes of which >50% was spent in counseling and coordination of care     Antonio Hawley MD

## 2021-06-05 VITALS
HEIGHT: 69 IN | OXYGEN SATURATION: 96 % | HEART RATE: 77 BPM | DIASTOLIC BLOOD PRESSURE: 76 MMHG | TEMPERATURE: 98.2 F | SYSTOLIC BLOOD PRESSURE: 124 MMHG | BODY MASS INDEX: 30.21 KG/M2 | WEIGHT: 204 LBS

## 2021-06-05 VITALS
SYSTOLIC BLOOD PRESSURE: 130 MMHG | TEMPERATURE: 98 F | HEART RATE: 72 BPM | WEIGHT: 206 LBS | BODY MASS INDEX: 30.51 KG/M2 | HEIGHT: 69 IN | OXYGEN SATURATION: 97 % | DIASTOLIC BLOOD PRESSURE: 74 MMHG

## 2021-06-05 NOTE — PROGRESS NOTES
"  Office Visit - Follow Up   Rich Musa   74 y.o. male    Date of Visit: 2/6/2020    Chief Complaint   Patient presents with     Flank Pain     Rt flank pains & Rt side abd pains x 4 wks. No changes in ua output         Assessment and Plan   1. Flank pain  Etiology unclear.  We discussed the differential today.  Nephrolithiasis would be up there.  Ulcer disease would be possible but less likely given his omeprazole use.  Cholelithiasis would be possible.  More worrisome findings such as abdominal cancers would be possible.  Labs will be done today.  CT abdomen pelvis for further evaluation.  - Urinalysis-UC if Indicated; Future  - Urinalysis-UC if Indicated  - Comprehensive Metabolic Panel  - HM2(CBC w/o Differential)  - CT Abdomen Pelvis With Oral Without IV Contrast; Future    2. Abdominal pain, right upper quadrant  As above.  - Comprehensive Metabolic Panel  - HM2(CBC w/o Differential)  - CT Abdomen Pelvis With Oral Without IV Contrast; Future    3. Nephrolithiasis  Urinalysis was done today and shows no evidence of blood.    4. GERD with stricture  Continue with PPI.         No follow-ups on file.     History of Present Illness   This 74 y.o. old patient comes in today with 4 weeks of right sided flank pain and abdominal pain.  It comes and goes.  He is lost some weight but thinks that that is intentional.  Movement seems to make it worse at times.  Food seems to make it worse at times.  Spicy foods seem to make it worse.  There is no been no blood in the urine.  No change in his bowel habits.  No nausea or vomiting.  He is never had anything similar to this before that he is aware of.  He did have some back pain when he had nephrolithiasis in the past and is also had \"gastritis\".  He has been taking his omeprazole every day.    Review of Systems: A comprehensive review of systems was negative except as noted.     Medications, Allergies and Problem List   Reviewed, reconciled and updated  Post Discharge " "Medication Reconciliation Status:      Physical Exam   General Appearance:   A pleasant gentleman in no distress.  He is not jaundiced today.  No spinal tenderness palpation.  No flank tenderness or CVA tenderness today.  No rashes on his back or abdomen.  No pain on palpation over the back.  Abdomen soft and nontender and there is no guarding rebound or masses palpable.    /80 (Patient Site: Right Arm, Patient Position: Sitting, Cuff Size: Adult Regular)   Pulse 78   Ht 5' 9\" (1.753 m)   Wt 204 lb (92.5 kg)   SpO2 95%   BMI 30.13 kg/m           Additional Information   Current Outpatient Medications   Medication Sig Dispense Refill     acetaminophen (TYLENOL) 500 MG tablet Take 500 mg by mouth every 6 (six) hours as needed for pain.       calcium cit/Mgox/vit D3/B6/min (CALCIUM CITRATE + D WITH MAG ORAL) Take by mouth.       multivit with minerals/lutein (MULTIVITAMIN 50 PLUS ORAL) Take 1 tablet by mouth daily.       omeprazole (PRILOSEC) 20 MG capsule Take 1 capsule (20 mg total) by mouth daily. 90 capsule 3     tamsulosin (FLOMAX) 0.4 mg Cp24 Take 0.4 mg by mouth.       No current facility-administered medications for this visit.      Allergies   Allergen Reactions     Sulfa (Sulfonamide Antibiotics)      Social History     Tobacco Use     Smoking status: Never Smoker     Smokeless tobacco: Never Used   Substance Use Topics     Alcohol use: No     Comment: social     Drug use: No       Review and/or order of clinical lab tests:  Review and/or order of radiology tests:  Review and/or order of medicine tests:  Discussion of test results with performing physician:  Decision to obtain old records and/or obtain history from someone other than the patient:  Review and summarization of old records and/or obtaining history from someone other than the patient and.or discussion of case with another health care provider:  Independent visualization of image, tracing or specimen itself:    Time: not applicable "     Antonio Hawley MD

## 2021-06-06 NOTE — PROGRESS NOTES
"  Office Visit - Follow Up   Rich Musa   75 y.o. male    Date of Visit: 3/2/2020    Chief Complaint   Patient presents with     Flank Pain     sxs's are better - stopped taking naproxen x 2 days        Assessment and Plan   1. Flank pain  Resolved.  If he has recurrence he is to let me know.    2. History of colonic polyps  Given his history of colon polyps I think we should proceed with another scope.  It is been over 5 years.  - Ambulatory referral for Colonoscopy    3. Change in bowel habit  As above.  - Ambulatory referral for Colonoscopy    4. Nephrolithiasis  He does have a tiny left-sided stone.  We discussed preventative measures including hydration today.        Return in about 8 months (around 11/2/2020) for AWV, Next scheduled follow up.     History of Present Illness   This 75 y.o. old patient comes in today for follow-up of his flank pain.  He is no longer having it.  Took naproxen for couple weeks.  He feels good.  He does note he thinks he should have another colonoscopy.  He has a history of colon polyps and was on a five-year plan but his last one was over 5 years ago and they told him he did not need anymore but he was only in his 60s at the time.  He does note some constipation change in bowel habit.  He does have some blood in the stool with wiping on the TP but this is unchanged for years.    Review of Systems: A comprehensive review of systems was negative except as noted.     Medications, Allergies and Problem List   Reviewed, reconciled and updated  Post Discharge Medication Reconciliation Status:      Physical Exam   General Appearance:   Pleasant gentleman no distress.  Looks good.    /80 (Patient Site: Right Arm, Patient Position: Sitting, Cuff Size: Adult Regular)   Pulse 66   Ht 5' 9\" (1.753 m)   Wt 210 lb (95.3 kg)   SpO2 96%   BMI 31.01 kg/m           Additional Information   Current Outpatient Medications   Medication Sig Dispense Refill     calcium cit/Mgox/vit " D3/B6/min (CALCIUM CITRATE + D WITH MAG ORAL) Take by mouth.       multivit with minerals/lutein (MULTIVITAMIN 50 PLUS ORAL) Take 1 tablet by mouth daily.       omeprazole (PRILOSEC) 20 MG capsule Take 1 capsule (20 mg total) by mouth daily. 90 capsule 3     tamsulosin (FLOMAX) 0.4 mg Cp24 Take 0.4 mg by mouth.       naproxen (NAPROSYN) 500 MG tablet Take 1 tablet (500 mg total) by mouth 2 (two) times a day with meals. 60 tablet 0     No current facility-administered medications for this visit.      Allergies   Allergen Reactions     Sulfa (Sulfonamide Antibiotics)      Social History     Tobacco Use     Smoking status: Never Smoker     Smokeless tobacco: Never Used   Substance Use Topics     Alcohol use: No     Comment: social     Drug use: No       Review and/or order of clinical lab tests:  Review and/or order of radiology tests:  Review and/or order of medicine tests:  Discussion of test results with performing physician:  Decision to obtain old records and/or obtain history from someone other than the patient:  Review and summarization of old records and/or obtaining history from someone other than the patient and.or discussion of case with another health care provider:  Independent visualization of image, tracing or specimen itself:    Time:      Antonio Hawley MD

## 2021-06-07 ENCOUNTER — OFFICE VISIT - HEALTHEAST (OUTPATIENT)
Dept: INTERNAL MEDICINE | Facility: CLINIC | Age: 76
End: 2021-06-07

## 2021-06-07 DIAGNOSIS — H61.21 IMPACTED CERUMEN OF RIGHT EAR: ICD-10-CM

## 2021-06-07 DIAGNOSIS — H90.11 CONDUCTIVE HEARING LOSS OF RIGHT EAR, UNSPECIFIED HEARING STATUS ON CONTRALATERAL SIDE: ICD-10-CM

## 2021-06-09 NOTE — PROGRESS NOTES
"  Office Visit - Follow Up   Rich Musa   72 y.o. male    Date of Visit: 3/17/2017    Chief Complaint   Patient presents with     Sinus Problem     x 3 weeks - pain along teeth and coughing     Wrist Pain     right - loss of motion        Assessment and Plan   1. Wrist sprain, right, initial encounter   this wrist injury occurred about six months ago. No notices less range of motion of his right wrist and snuff box tenderness.  X-rays are not done here. Needs to see hand surgeon.  - Ambulatory referral to Orthopedic Surgery    2. Acute sinusitis   Will treat with  Minocycline 100 mg twice per day for 7 to 10 days.      The following high BMI interventions were performed this visit: encouragement to exercise and weight monitoring    No Follow-up on file.     History of Present Illness   This 72 y.o. old  As to complaints. Number one he's been sick for the last three weeks initially had fever and myalgias and a sore throat and a dry cough. This influenza -like illness did resolve. He now however had persistent left-sided maxillary sinus pain with posterior nasal drainage. No fever.   Number two he injured his right wrist laughs to fall by falling on an outstretched hand. He now has ongoing pain at the base of his right thumb he notes that his range of motion of his thumb and wrist is less since his injury. He hasn't sought any medical attention for this injury.    Review of Systems: A comprehensive review of systems was negative except as noted.     Medications, Allergies and Problem List   Reviewed and updated     Physical Exam   General Appearance:       Visit Vitals     /72 (Patient Site: Right Arm, Patient Position: Sitting)     Pulse 73     Ht 5' 9\" (1.753 m)     Wt 202 lb (91.6 kg)     BMI 29.83 kg/m2        Max Ursinus is tender to palpation. His mouth and throat are negative. His lungs are clear.   There is no [place] fight is in his wrist. He has mild tenderness to palpation in the snuff box. He has " a bit of diminished range of motion of his base of his thumb but nothing prominent. His wrist range of motion does seem adequate.   I told him that he needs to see a hand surgeon and that they would get imaging studies at that time.     Additional Information   Current Outpatient Prescriptions   Medication Sig Dispense Refill     MULTIVITAMIN ORAL Take 1 tablet by mouth daily. Tablet.       omeprazole (PRILOSEC) 20 MG capsule TAKE 1 CAPSULE (20 MG) BY ORAL ROUTE ONCE DAILY 90 capsule 3     POLYETHYLENE GLYCOL 3350 (MIRALAX ORAL) Take by mouth.       benzonatate (TESSALON) 100 MG capsule Take 1 capsule (100 mg total) by mouth 3 (three) times a day as needed for cough. 30 capsule 1     minocycline (MINOCIN,DYNACIN) 100 MG capsule Take 1 capsule (100 mg total) by mouth 2 (two) times a day. 20 capsule 0     No current facility-administered medications for this visit.      Allergies   Allergen Reactions     Augmentin [Amoxicillin-Pot Clavulanate] Diarrhea     Codeine-Guaifenesin      Sulfa (Sulfonamide Antibiotics)      Social History   Substance Use Topics     Smoking status: Never Smoker     Smokeless tobacco: Never Used     Alcohol use No      Comment: social       Review and/or order of clinical lab tests:  Review and/or order of radiology tests:  Review and/or order of medicine tests:  Discussion of test results with performing physician:  Decision to obtain old records and/or obtain history from someone other than the patient:  Review and summarization of old records and/or obtaining history from someone other than the patient and.or discussion of case with another health care provider:  Independent visualization of image, tracing or specimen itself:    Time: total time spent with the patient was 15 minutes of which >50% was spent in counseling and coordination of care     Ryan Santacruz MD

## 2021-06-09 NOTE — TELEPHONE ENCOUNTER
I don't know why that would be and I don't do the billing/coding for the gastroenterologists.  I would have him contact them to see if they can change it to screening.

## 2021-06-09 NOTE — TELEPHONE ENCOUNTER
Who is calling:  The patient   Reason for Call:  The patient states he received the bill for the colonoscopy on 6/9/20 and had to pay $250.00 copayment because it was billed as diagnostic instead of screening.  Date of last appointment with primary care:   Okay to leave a detailed message: Yes

## 2021-06-11 NOTE — PROGRESS NOTES
Subjective findings: The patient returns to the clinic today leaning of some moderate numbness involving the toes of both feet.  He stated that he exercises regularly.  He typically uses the treadmill on a regular basis.  Increase activity seems to exacerbate the numbness.  After rest he stated that the numbness does slightly improved.  He has no pain associated with the numbness.  He does not recall any particular trauma to his feet.  He had no redness or significant swelling associated with his symptoms.     Objective findings: Nails bilateral feet are normal length and color.  Skin bilateral feet warm supple and intact.  DP and PT pulses +2/4 bilateral feet.  Capillary refill less than 2 seconds bilateral feet.  Negative clonus, negative Babinski bilateral feet.  Range of motion within normal limits bilateral feet.  Muscle power +5/5 bilaterally in all compartments.  There is a positive Tinel sign when percussing the deep peroneal nerve bilateral feet.  There is a mild positive Tad sign noted third intermetatarsal space right foot.     Assessment: Eddy's neuroma right foot, compression neuropathy deep peroneal nerve bilaterally     Plan: I informed the patient that he does have a neuroma and some mild compression neuropathy of the deep peroneal nerve.  I informed the patient that this is probably due to mechanical irritation of the nerve.  I do not feel this will progress and become debilitating.  He is to monitor his condition.  If his symptoms do progress he is to return to the clinic for follow-up visit.

## 2021-06-11 NOTE — PROGRESS NOTES
ASSESSMENT/PLAN:   1. Cerumen impaction  Nursing communication         Bilateral were instilled flushed with warm water without difficulty. Cerumen removed by gentle syringing.  The patient tolerated the procedure well.  Examination of the ear post cerumen removal: External ears essentially normal. Canals clear of debris. TM's normal.  Hearing improved and symptoms resolved.    Counseled prevention of future cerumen impaction/build-up. May use OTC Debrox as needed for cerumen impaction.  Recheck with a PCP as needed, or if symptoms recur/worsening recheck with [facility] or PCP.    Patient Instructions:  Patient Instructions   We removed the earwax from your ears today.  Please use Debrox drops at home as needed for earwax buildup.  Come back to clinic if recurrent problems.                    SUBJECTIVE:   Rich Musa is a 72 y.o. male who presents today for evaluation of bilateral ear plugging. Right is worse than left. He has near complete loss of hearing in the right ear. He does have ringing in the right ear but this is chronic for him. He has a history of cerumen impaction. He has been using Debrox without success. No ear pain. No nasal congestion, runny nose, cold symptoms. He denies putting anything in his ears.      Past Medical History:  Patient Active Problem List   Diagnosis     Hyperlipidemia     Esophageal Reflux     Adult Sleep Apnea     Seborrheic Dermatitis     Seborrheic Keratosis     Nephrolithiasis     Trochanteric Bursitis     Lyme Disease     Bacterial Pneumonia       Surgical History:  Past Surgical History:   Procedure Laterality Date     NM KNEE SCOPE,DIAGNOSTIC      Description: Arthroscopy Knee Right;  Recorded: 2011;           Family History:  Family History   Problem Relation Age of Onset     Diabetes Mother      Breast cancer Mother      Diabetes Father      Kidney disease Father       age 52 nephropathy     Diabetes Sister      Cancer Brother      bladder      Reviewed; Non-contributory      Social History:    History   Smoking Status     Never Smoker   Smokeless Tobacco     Never Used     Smoking: none  Alcohol use: occasionally  Other drug use: none  Occupation: retired      Current Medications:  Current Outpatient Prescriptions on File Prior to Visit   Medication Sig Dispense Refill     MULTIVITAMIN ORAL Take 1 tablet by mouth daily. Tablet.       omeprazole (PRILOSEC) 20 MG capsule TAKE 1 CAPSULE (20 MG) BY ORAL ROUTE ONCE DAILY 90 capsule 3     POLYETHYLENE GLYCOL 3350 (MIRALAX ORAL) Take by mouth.       [DISCONTINUED] benzonatate (TESSALON) 100 MG capsule Take 1 capsule (100 mg total) by mouth 3 (three) times a day as needed for cough. 30 capsule 1     No current facility-administered medications on file prior to visit.        Allergies:   Allergies   Allergen Reactions     Augmentin [Amoxicillin-Pot Clavulanate] Diarrhea     Codeine-Guaifenesin      Sulfa (Sulfonamide Antibiotics)        I personally reviewed patient's past medical, surgical, social, family history and allergies.    ROS:  Review of Systems  Positive for difficulty hearing, ear wax, and ear fullness.  Negative for ear pain.  Negative for fever, nasal congestion, runny nose.      OBJECTIVE:   Vitals:    07/10/17 1208   BP: 130/80   Pulse: 66   Temp: 98.4  F (36.9  C)   TempSrc: Oral   SpO2: 95%   Weight: 202 lb 12.8 oz (92 kg)           General Appearance:  Alert, well-appearing older male in NAD. Afebrile.    HEENT:  Head: Atraumatic, normocephalic. Face nontraumatic.  Eyes: Conjunctiva clear, Lids normal.  Ears:  Bilateral cerumen impaction.  Respiratory: No distress. Lungs clear to ausculation bilaterally. No crackles, wheezes, rhonchi or stridor.  Cardiovascular: Regular rate and rhythm, no murmur, rub or gallop. No obvious chest wall deformities.   Neurologic: Alert and orientated appropriately. No focal deficits.

## 2021-06-13 NOTE — PROGRESS NOTES
Order for Durable Medical Equipment was processed and equipment ordered.   DME provider: [facility]  Date Faxed: 10/23/17  Ordering Provider:   Equipment ordered: Cpap supply renewal

## 2021-06-13 NOTE — PROGRESS NOTES
Assessment and Plan:   1. Encounter for Medicare annual wellness exam  He is up-to-date on his health maintenance.  He lives an active and healthy lifestyle.  Continue same.    2. Benign prostatic hyperplasia with urinary hesitancy  I have offered increasing the Flomax to 0.8 mg and he declines and will follow up with Dr. Robledo plan.  We discussed TURPs.  - Urinalysis-UC if Indicated    3. Dyslipidemia  Labs today for monitoring.  - Comprehensive Metabolic Panel  - Lipid Cascade    4. GERD with stricture  Controlled.  We have discussed longstanding PPI use as planned.  - HM2(CBC w/o Differential)    5. BYRN on CPAP  Continue CPAP.  - HM2(CBC w/o Differential)    6. Enthesopathy of left hip region  Continue his stretching and I have suggested possibly some Voltaren gel topically.  Offered orthopedics and he declines.    7. Plantar fasciitis  As above.  Consider some Voltaren gel.  Stretching.  Offered podiatry evaluation he declines.  Patient has been advised of split billing requirements and indicates understanding: Yes      The patient's current medical problems were reviewed.    I have had an Advance Directives discussion with the patient.  The following health maintenance schedule was reviewed with the patient and provided in printed form in the after visit summary:   Health Maintenance   Topic Date Due     HEPATITIS C SCREENING  1945     Pneumococcal Vaccine: 65+ Years (2 of 2 - PPSV23) 04/08/2016     MEDICARE ANNUAL WELLNESS VISIT  11/10/2021     FALL RISK ASSESSMENT  11/10/2021     LIPID  11/06/2024     ADVANCE CARE PLANNING  11/06/2024     TD 18+ HE  11/06/2029     COLORECTAL CANCER SCREENING  06/09/2030     INFLUENZA VACCINE RULE BASED  Completed     ZOSTER VACCINES  Completed     Pneumococcal Vaccine: Pediatrics (0 to 5 Years) and At-Risk Patients (6 to 64 Years)  Aged Out     HEPATITIS B VACCINES  Aged Out        Subjective:   Chief Complaint: Rich Musa is an 75 y.o. male here for an Annual  Wellness visit.   HPI: Khadar comes in today for annual wellness.  He has been dealing with a left plantar fasciitis and a left hip bursitis.  This is not new.  Is been ongoing for at least a year now.  Started after hiking took once.  He is doing the stretches he is been given in the past.  He has seen orthopedics and podiatry for these issues in the past years ago.  He is not taking anything for this now.  Its seems to be getting better.  His BPH is worsening.  He sees Dr. Grewal this winter or early spring some time.  He is able to fully empty his bladder he believes.  Otherwise no new changes.  Kids and grandkids are doing well.  He is compliant with his CPAP.  GERD is controlled.  No symptoms of recurrent stricture.    Review of Systems:    Please see above.  The rest of the review of systems are negative for all systems.    Patient Care Team:  Antonio Hawley MD as PCP - General (Internal Medicine)  Antonio Hawley MD as Assigned PCP  Shaan Grewal MD as Physician (Urology)  Black Carlos MD (Dermatology)     Patient Active Problem List   Diagnosis     Dyslipidemia     GERD with stricture     BRYN on CPAP     Seborrheic Dermatitis     Seborrheic Keratosis     Nephrolithiasis     Trochanteric Bursitis     Benign prostatic hyperplasia with urinary hesitancy     Plantar fasciitis     No past medical history on file.   Past Surgical History:   Procedure Laterality Date     ME KNEE SCOPE,DIAGNOSTIC      Description: Arthroscopy Knee Right;  Recorded: 2011;      Family History   Problem Relation Age of Onset     Diabetes Mother      Breast cancer Mother      Diabetes Father      Kidney disease Father          age 52 nephropathy     Diabetes Sister      Cancer Brother         bladder      Social History     Socioeconomic History     Marital status:      Spouse name: Not on file     Number of children: Not on file     Years of education: Not on file     Highest education level: Not on file  "  Occupational History     Not on file   Social Needs     Financial resource strain: Not on file     Food insecurity     Worry: Not on file     Inability: Not on file     Transportation needs     Medical: Not on file     Non-medical: Not on file   Tobacco Use     Smoking status: Never Smoker     Smokeless tobacco: Never Used   Substance and Sexual Activity     Alcohol use: No     Comment: social     Drug use: No     Sexual activity: Not on file   Lifestyle     Physical activity     Days per week: Not on file     Minutes per session: Not on file     Stress: Not on file   Relationships     Social connections     Talks on phone: Not on file     Gets together: Not on file     Attends Sikh service: Not on file     Active member of club or organization: Not on file     Attends meetings of clubs or organizations: Not on file     Relationship status: Not on file     Intimate partner violence     Fear of current or ex partner: Not on file     Emotionally abused: Not on file     Physically abused: Not on file     Forced sexual activity: Not on file   Other Topics Concern     Not on file   Social History Narrative     Not on file      Current Outpatient Medications   Medication Sig Dispense Refill     calcium cit/Mgox/vit D3/B6/min (CALCIUM CITRATE + D WITH MAG ORAL) Take 2 tablets by mouth daily.        multivit with minerals/lutein (MULTIVITAMIN 50 PLUS ORAL) Take 1 tablet by mouth daily.       omeprazole (PRILOSEC) 20 MG capsule Take 1 capsule (20 mg total) by mouth daily before breakfast. 90 capsule 3     polyethylene glycol (MIRALAX) 17 gram packet Take 17 g by mouth daily.       tamsulosin (FLOMAX) 0.4 mg Cp24 Take 0.4 mg by mouth.       No current facility-administered medications for this visit.       Objective:   Vital Signs:   Visit Vitals  /74 (Patient Site: Right Arm, Patient Position: Sitting, Cuff Size: Adult Regular)   Pulse 72   Temp 98  F (36.7  C)   Ht 5' 9\" (1.753 m)   Wt 206 lb (93.4 kg)   SpO2 " 97%   BMI 30.42 kg/m           VisionScreening:  No exam data present     PHYSICAL EXAM  Pleasant gentleman appears his age or younger.  HEENT is unremarkable.  He does have some cerumen in the ears bilaterally but not not impacted.  Neck is supple.  Lungs are clear.  Heart is regular.  Abdomen soft and nontender.  Extremities without edema.    Assessment Results 11/10/2020   Activities of Daily Living No help needed   Instrumental Activities of Daily Living No help needed   Get Up and Go Score Less than 12 seconds   Mini Cog Total Score 5   Some recent data might be hidden     A Mini-Cog score of 0-2 suggests the possibility of dementia, score of 3-5 suggests no dementia        Identified Health Risks:     The patient was provided with written information regarding signs of hearing loss.  Patient's advanced directive was discussed and I am comfortable with the patient's wishes.

## 2021-06-13 NOTE — TELEPHONE ENCOUNTER
Atorvastatin 10 mg #90 with 1 refill.  Please place prescription.  Please have him come for lipid [place] in 8 to 12 weeks.  Please place order.  Diagnosis is hypercholesterolemia.  Let him know that main side effect would be muscle aches or cramping.  If he develops this he should let me know right away.

## 2021-06-13 NOTE — PROGRESS NOTES
Dear  Ryan Santacruz Md  [address] [place] [address]    Thank you for the opportunity to participate in the care of  Rich Musa.    He is a 72 y.o. y/o who comes to the clinic to establish care for his sleep apnea.    The patient was diagnosed with BRYN approximately 12 years ago. A PSG test was completed [facility] and showed and AHI of 23.5.   Prior to the diagnosis of BRYN he was experiencing  Snoring and witnessed apneas. CPAP was initiated after the test and he has used this therapy without interruptions.  After starting PAP the patient had resolution of symptoms.     Currently, he reports that he uses his CPAP device every night and this is working well. He denies snoring when he uses his device.    Current DME provider:[facility]  Current device: [employer] One  Current settings:APAP 13-5 to 15 cwp  Last time supplies were received: 3 months ago    Current AHI: 1.3  Current compliance: 100% usage  Leak: 0%  90th percentile pressure: 14.5    ESS: 11.    Past Medical History  Patient Active Problem List   Diagnosis     Hyperlipidemia     Esophageal Reflux     Adult Sleep Apnea     Seborrheic Dermatitis     Seborrheic Keratosis     Nephrolithiasis     Trochanteric Bursitis     Lyme Disease     Bacterial Pneumonia        Past Surgical History  Past Surgical History:   Procedure Laterality Date     MA KNEE SCOPE,DIAGNOSTIC      Description: Arthroscopy Knee Right;  Recorded: 12/19/2011;        Meds  Current Outpatient Prescriptions   Medication Sig Dispense Refill     MULTIVITAMIN ORAL Take 1 tablet by mouth daily. Tablet.       omeprazole (PRILOSEC) 20 MG capsule TAKE 1 CAPSULE (20 MG) BY ORAL ROUTE ONCE DAILY 90 capsule 1     POLYETHYLENE GLYCOL 3350 (MIRALAX ORAL) Take by mouth.       tamsulosin (FLOMAX) 0.4 mg Cp24 Take 0.4 mg by mouth.       No current facility-administered medications for this visit.         Allergies  Augmentin [amoxicillin-pot clavulanate]; Codeine-guaifenesin; and Sulfa  "(sulfonamide antibiotics)     Social History  Social History     Social History     Marital status:      Spouse name: N/A     Number of children: N/A     Years of education: N/A     Occupational History     Not on file.     Social History Main Topics     Smoking status: Never Smoker     Smokeless tobacco: Never Used     Alcohol use No      Comment: social     Drug use: No     Sexual activity: Not on file     Other Topics Concern     Not on file     Social History Narrative        Family History  Family History   Problem Relation Age of Onset     Diabetes Mother      Breast cancer Mother      Diabetes Father      Kidney disease Father       age 52 nephropathy     Diabetes Sister      Cancer Brother      bladder           Review of Systems:  Constitutional: Negative except as noted in HPI.   Eyes: Negative except as noted in HPI.   ENT: Negative except as noted in HPI.   Cardiovascular: Negative except as noted in HPI.   Respiratory: Negative except as noted in HPI.   Gastrointestinal: Negative except as noted in HPI.   Genitourinary: Negative except as noted in HPI.   Musculoskeletal: Negative except as noted in HPI.   Integumentary: Negative except as noted in HPI.   Neurological: Negative except as noted in HPI.   Psychiatric: Negative except as noted in HPI.   Endocrine: Negative except as noted in HPI.   Hematologic/Lymphatic: Negative except as noted in HPI.      Physical Exam:  /64  Pulse 72  Ht 5' 9\" (1.753 m)  Wt 207 lb (93.9 kg)  SpO2 95%  BMI 30.57 kg/m2  BMI:Body mass index is 30.57 kg/(m^2).   GEN: NAD, obese  Head: Normocephalic.  EYES: PERRLA, EOMI  Neurological: Alert, oriented to time, place, and person.  Psych:  normal mood, normal affect     Labs/Studies:     Lab Results   Component Value Date    WBC 5.2 2016    HGB 15.4 2016    HCT 46.1 2016    MCV 87 2016     2016         Chemistry        Component Value Date/Time     2016 1132 "    K 4.3 08/08/2016 1132     08/08/2016 1132    CO2 28 08/08/2016 1132    BUN 20 08/08/2016 1132    CREATININE 1.13 08/08/2016 1132    GLU 94 08/08/2016 1132        Component Value Date/Time    CALCIUM 8.4 (L) 08/08/2016 1132    ALKPHOS 50 08/08/2016 1132    AST 23 08/08/2016 1132    ALT 17 08/08/2016 1132    BILITOT 0.9 08/08/2016 1132        Polysomnogram Reviewed- see HPI      Assessment and Plan:  In summary Rich Musa is a 72 y.o. year old male here for consultation.    1. Obstructive sleep apnea.  Well controlled BRYN.  Excellent compliance.  I will write a prescription for supplies.     Patient verbalized understanding of these issues, agrees with the plan and all questions were answered today. Patient was given an opportuntity to voice any other symptoms or concerns not listed above. Patient did not have any other symptoms or concerns.      RTC in 12 months.      MD BUCK Levi Board Certified in Internal Medicine and Sleep Medicine  [facility].

## 2021-06-13 NOTE — TELEPHONE ENCOUNTER
"----- Message from Antonio Hawley MD sent at 11/11/2020  6:03 PM CST -----  Please call pt and send letter:    Khadar, your labs here look good.  I do want to point out your cholesterol however.  Although your numbers are all considered to be \"normal\", given your age and male sex, your 10-year risk of having a cardiac event is nearly 25%.  This is high enough that current recommendations would urge you to consider taking a cholesterol-lowering medicine for prevention of heart attack and stroke.  I do not know if Dr. Santacruz ever discussed this with you in the past.  Would this be something that we could get started for you?  I would recommend a low-dose of Lipitor or Crestor.  Both are generic and fairly well-tolerated.    Karen, please let me know what he says.  "

## 2021-06-13 NOTE — TELEPHONE ENCOUNTER
Patient called and notified lab results/recommendations and verbalized understanding. Patient reports that he is willing to start something under PCP recommendations. Please advise on medication, sig, future follow up plan, etc. Thank you.

## 2021-06-13 NOTE — PROGRESS NOTES
Assessment and Plan:    Annual wellness visit    1. Mixed hyperlipidemia    - Lipid Cascade    2. Nephrolithiasis   no kidney stone problem for several years  - HM2(CBC w/o Differential)  - Comprehensive Metabolic Panel    3. Obstructive sleep apnea syndrome   uses his CPAP machine every night. Now gets this provided by our [facility] sleep providers      The patient's current medical problems were reviewed.    I have had an Advance Directives discussion with the patient.  The following high BMI interventions were performed this visit: encouragement to exercise and weight monitoring  The following health maintenance schedule was reviewed with the patient and provided in printed form in the after visit summary:   Health Maintenance   Topic Date Due     ZOSTER VACCINE  02/12/2007     FALL RISK ASSESSMENT  10/31/2018     COLONOSCOPY  05/04/2019     TD 18+ HE  09/09/2019     ADVANCE DIRECTIVES DISCUSSED WITH PATIENT  04/08/2020     PNEUMOCOCCAL POLYSACCHARIDE VACCINE AGE 65 AND OVER  Completed     INFLUENZA VACCINE RULE BASED  Completed     PNEUMOCOCCAL CONJUGATE VACCINE FOR ADULTS (PCV13 OR PREVNAR)  Completed        Subjective:   Chief Complaint: Rich Musa is an 72 y.o. male here for an Annual Wellness visit.   HPI:   Comes in for an annual wellness visit. Current physicians he saying our Dr. Grewal from urology on a yearly basis. He now sees Dr. Dave  For his obstructive sleep apnea. He sees numerous physicians at [facility] on  An as needed basis.    he complains of occasional right knee pain but overall this is doing well since his right knee arthroscopy. He's had several cortisone injections in his thumb particular the right side. He's at the left-sided greater trochanteric injections.   He has a history of a shot skis ring that was dilated. He uses a omeprazole on daily basis. We discussed possibly trying Zantac instead.   He's been member of [employer] for many years. He's watching his weight  fairly closely he's pleased with that his weight is down 4 pounds. His BMI is less than 30 now. He goes to the [facility] three times per week and gets out goodly amount of exercise on a daily basis.   He's retired schoolteacher taught sociology. His wife is also a retired schoolteacher. They have children and grandchildren.   His next colonoscopy is due in 2019. He's aware of this.  Review of Systems:    Please see above.  The rest of the review of systems are negative for all systems.    Patient Care Team:  Ryan Santacruz MD as PCP - General     Patient Active Problem List   Diagnosis     Hyperlipidemia     Esophageal Reflux     Adult Sleep Apnea     Seborrheic Dermatitis     Seborrheic Keratosis     Nephrolithiasis     Trochanteric Bursitis     Lyme Disease     Bacterial Pneumonia     No past medical history on file.   Past Surgical History:   Procedure Laterality Date     [place] KNEE SCOPE,DIAGNOSTIC      Description: Arthroscopy Knee Right;  Recorded: 2011;      Family History   Problem Relation Age of Onset     Diabetes Mother      Breast cancer Mother      Diabetes Father      Kidney disease Father       age 52 nephropathy     Diabetes Sister      Cancer Brother      bladder      Social History     Social History     Marital status:      Spouse name: N/A     Number of children: N/A     Years of education: N/A     Occupational History     Not on file.     Social History Main Topics     Smoking status: Never Smoker     Smokeless tobacco: Never Used     Alcohol use No      Comment: social     Drug use: No     Sexual activity: Not on file     Other Topics Concern     Not on file     Social History Narrative      Current Outpatient Prescriptions   Medication Sig Dispense Refill     MULTIVITAMIN ORAL Take 1 tablet by mouth daily. Tablet.       omeprazole (PRILOSEC) 20 MG capsule TAKE 1 CAPSULE (20 MG) BY ORAL ROUTE ONCE DAILY 90 capsule 1     POLYETHYLENE GLYCOL 3350 (MIRALAX ORAL) Take by mouth.    "    tamsulosin (FLOMAX) 0.4 mg Cp24 Take 0.4 mg by mouth.       No current facility-administered medications for this visit.       Objective:   Vital Signs:   Visit Vitals     /70 (Patient Site: Left Arm, Patient Position: Sitting, Cuff Size: Adult Large)     Pulse 68     Ht 5' 9.25\" (1.759 m)     Wt 203 lb (92.1 kg)     SpO2 98%     BMI 29.76 kg/m2        VisionScreening:  No exam data present     PHYSICAL EXAM  /70 (Patient Site: Left Arm, Patient Position: Sitting, Cuff Size: Adult Large)  Pulse 68  Ht 5' 9.25\" (1.759 m)  Wt 203 lb (92.1 kg)  SpO2 98%  BMI 29.76 kg/m2    General Appearance:    Alert, cooperative, no distress, appears stated age   Head:    Normocephalic, without obvious abnormality, atraumatic   Eyes:    PERRL, conjunctiva/corneas clear, EOM's intact, fundi     benign, both eyes        Ears:    Normal TM's and external ear canals, both ears   Nose:   Nares normal, septum midline, mucosa normal, no drainage    or sinus tenderness   Throat:   Lips, mucosa, and tongue normal; teeth and gums normal   Neck:   Supple, symmetrical, trachea midline, no adenopathy;        thyroid:  No enlargement/tenderness/nodules; no carotid    bruit or JVD   Back:     Symmetric, no curvature, ROM normal, no CVA tenderness   Lungs:     Clear to auscultation bilaterally, respirations unlabored   Chest wall:    No tenderness or deformity   Heart:    Regular rate and rhythm, S1 and S2 normal, no murmur, rub    or gallop   Abdomen:     Soft, non-tender, bowel sounds active all four quadrants,     no masses, no organomegaly   Genitalia:     Rectal:     Extremities:   Extremities normal, atraumatic, no cyanosis or edema   Pulses:   2+ and symmetric all extremities   Skin:   Skin color, texture, turgor normal, no rashes or lesions   Lymph nodes:   Cervical, supraclavicular, and axillary nodes normal   Neurologic:   CNII-XII intact. Normal strength, sensation and reflexes       throughout       Assessment " Results 10/31/2017   Activities of Daily Living No help needed   Instrumental Activities of Daily Living No help needed   Get Up and Go Score Less than 12 seconds   Mini Cog Total Score 5   Some recent data might be hidden     A Mini-Cog score of 0-2 suggests the possibility of dementia, score of 3-5 suggests no dementia    Identified Health Risks:     Patient's advanced directive was discussed and I am comfortable with the patient's wishes.

## 2021-06-13 NOTE — TELEPHONE ENCOUNTER
Patient notified results/recommendations below and verbalized understanding. Rx set up for auth and future lab orders enter in Epic to be co-sign. Future fasting lab appt scheduled for 2/2/21 at 9:30 am per patient's requests.

## 2021-06-16 PROBLEM — M72.2 PLANTAR FASCIITIS: Status: ACTIVE | Noted: 2020-11-10

## 2021-06-16 PROBLEM — R39.11 BENIGN PROSTATIC HYPERPLASIA WITH URINARY HESITANCY: Status: ACTIVE | Noted: 2019-12-10

## 2021-06-16 PROBLEM — N40.1 BENIGN PROSTATIC HYPERPLASIA WITH URINARY HESITANCY: Status: ACTIVE | Noted: 2019-12-10

## 2021-06-17 NOTE — PATIENT INSTRUCTIONS - HE
Patient Instructions by Ryan Santacruz MD at 11/6/2019  9:40 AM     Author: Ryan Santacruz MD Service: -- Author Type: Physician    Filed: 11/6/2019 10:16 AM Encounter Date: 11/6/2019 Status: Signed    : Ryan Santacruz MD (Physician)         Patient Education   Signs of Hearing Loss  Hearing loss is a problem shared by many people. In fact, it is one of the most common health conditions, particularly as people age. Most people over age 65 have some hearing loss, and by age 80, almost everyone does. Because hearing loss usually occurs slowly over the years, you may not realize your hearing ability has gotten worse.       Have your hearing checked  Contact your [facility] care provider if you:    Have to strain to hear normal conversation.    Have to watch other peoples faces very carefully to follow what theyre saying.    Need to ask people to repeat what theyve said.    Often misunderstand what people are saying.    Turn the volume of the television or radio up so high that others complain.    Feel that people are mumbling when theyre talking to you.    Find that the effort to hear leaves you feeling tired and irritated.    Notice, when using the phone, that you hear better with 1 ear than the other.    5819-2004 [employer]. [address] [place] [address], [place] [address]. All rights reserved. This information is not intended as a substitute for professional medical care. Always follow your healthcare professional's instructions.           Advance Directive  Patients advance directive was discussed and I am comfortable with the patients wishes.  Patient Education   Personalized Prevention Plan  You are due for the preventive services outlined below.  Your care team is available to assist you in scheduling these services.  If you have already completed any of these items, please share that information with your care team to update in your medical record.  Health Maintenance   Topic Date  Due   ? HEPATITIS C SCREENING  1945   ? PNEUMOCOCCAL IMMUNIZATION 65+ LOW/MEDIUM RISK (2 of 2 - PPSV23) 04/08/2016   ? INFLUENZA VACCINE RULE BASED (1) 08/01/2019   ? TD 18+ HE  09/09/2019   ? MEDICARE ANNUAL WELLNESS VISIT  11/06/2020   ? FALL RISK ASSESSMENT  11/06/2020   ? ADVANCE CARE PLANNING  11/06/2023   ? COLONOSCOPY  05/22/2024   ? ZOSTER VACCINES  Completed

## 2021-06-18 NOTE — PATIENT INSTRUCTIONS - HE
Patient Instructions by Antonio Hawley MD at 11/10/2020 11:00 AM     Author: Antonio Hawley MD Service: -- Author Type: Physician    Filed: 11/10/2020 12:28 PM Encounter Date: 11/10/2020 Status: Signed    : Antonio Hawley MD (Physician)         Patient Education   Signs of Hearing Loss  Hearing loss is a problem shared by many people. In fact, it is one of the most common health conditions, particularly as people age. Most people over age 65 have some hearing loss, and by age 80, almost everyone does. Because hearing loss usually occurs slowly over the years, you may not realize your hearing ability has gotten worse.       Have your hearing checked  Contact your [facility] care provider if you:    Have to strain to hear normal conversation.    Have to watch other peoples faces very carefully to follow what theyre saying.    Need to ask people to repeat what theyve said.    Often misunderstand what people are saying.    Turn the volume of the television or radio up so high that others complain.    Feel that people are mumbling when theyre talking to you.    Find that the effort to hear leaves you feeling tired and irritated.    Notice, when using the phone, that you hear better with 1 ear than the other.    5851-4686 [employer]. [address] [place] [address], [place] [address]. All rights reserved. This information is not intended as a substitute for professional medical care. Always follow your healthcare professional's instructions.           Advance Directive  Patients advance directive was discussed and I am comfortable with the patients wishes.  Patient Education   Personalized Prevention Plan  You are due for the preventive services outlined below.  Your care team is available to assist you in scheduling these services.  If you have already completed any of these items, please share that information with your care team to update in your medical record.  Health Maintenance   Topic Date Due   ?  HEPATITIS C SCREENING  1945   ? Pneumococcal Vaccine: 65+ Years (2 of 2 - PPSV23) 04/08/2016   ? MEDICARE ANNUAL WELLNESS VISIT  11/10/2021   ? FALL RISK ASSESSMENT  11/10/2021   ? LIPID  11/06/2024   ? ADVANCE CARE PLANNING  11/06/2024   ? TD 18+ HE  11/06/2029   ? COLORECTAL CANCER SCREENING  06/09/2030   ? INFLUENZA VACCINE RULE BASED  Completed   ? ZOSTER VACCINES  Completed   ? Pneumococcal Vaccine: Pediatrics (0 to 5 Years) and At-Risk Patients (6 to 64 Years)  Aged Out   ? HEPATITIS B VACCINES  Aged Out

## 2021-06-19 NOTE — LETTER
Letter by Ryan Santacruz MD at      Author: Ryan Santacruz MD Service: -- Author Type: --    Filed:  Encounter Date: 7/2/2019 Status: (Other)         Rich Musa  [address] [facility] [address]             July 2, 2019         Dear Mr. Musa,    Below are the results from your recent visit:    Resulted Orders   HM2(CBC w/o Differential)   Result Value Ref Range    WBC 4.8 4.0 - 11.0 thou/uL    RBC 4.85 4.40 - 6.20 mill/uL    Hemoglobin 14.1 14.0 - 18.0 g/dL    Hematocrit 40.8 40.0 - 54.0 %    MCV 84 80 - 100 fL    MCH 29.1 27.0 - 34.0 pg    MCHC 34.5 32.0 - 36.0 g/dL    RDW 12.5 11.0 - 14.5 %    Platelets 200 140 - 440 thou/uL    MPV 8.3 7.0 - 10.0 fL   Thyroid Stimulating Hormone (TSH)   Result Value Ref Range    TSH 2.49 0.30 - 5.00 uIU/mL   Erythrocyte Sedimentation Rate   Result Value Ref Range    Sed Rate 14 0 - 15 mm/hr   Lyme Antibody Cascade   Result Value Ref Range    Lyme Antibody Cascade 0.10 <0.90 Index Value    Narrative    Interpretation of Lyme Disease Total Antibody (IgG/IgM)  <0.90 Test Value=Negative  No detectable antibodies to B. burgdorferi. Patients  in early stages of infection may not produce  detectable levels of antibody. Antibiotic therapy  in early disease may prevent antibody production  from reaching detectable levels. Patients with  clinical history and/or symptoms suggestive of Lyme  disease but with negative test results should be  retested in 2-4 weeks.  0.90-<1.10 Test Value=Borderline  Suggests the presence of antibodies to B.  burgdorferi. Recommend repeat collection in 2-4  weeks.  >=1.10 Test Value=Positive  Indicates the presence of antibodies to B.  burgdorferi. False positive results can occur with  sera from syphilis patients. Cross-reactivity may  occur with relapsing fever, [place] Spotted  fever, other spirochetal diseases, erythematosus,  EBV infection, or CMV infection. Clinical symptoms,  epidemiology of the case and other laboratory  tests  should allow for distinction of these conditions from  Lyme disease.       Khadar, all your lab tests are normal including your lyme test.    Please call with questions or contact us using [facility].    Sincerely,        Electronically signed by Ryan Santacruz MD

## 2021-06-19 NOTE — LETTER
Letter by Ryan Santacruz MD at      Author: Ryan Santacruz MD Service: -- Author Type: --    Filed:  Encounter Date: 11/6/2019 Status: Signed         Rich Musa  [address] [facility] [id number]             November 6, 2019         Dear Mr. Musa,    Below are the results from your recent visit:    Resulted Orders   HM2(CBC w/o Differential)   Result Value Ref Range    WBC 5.0 4.0 - 11.0 thou/uL    RBC 5.25 4.40 - 6.20 mill/uL    Hemoglobin 14.8 14.0 - 18.0 g/dL    Hematocrit 43.7 40.0 - 54.0 %    MCV 83 80 - 100 fL    MCH 28.2 27.0 - 34.0 pg    MCHC 33.9 32.0 - 36.0 g/dL    RDW 12.9 11.0 - 14.5 %    Platelets 186 140 - 440 thou/uL    MPV 9.0 7.0 - 10.0 fL   Comprehensive Metabolic Panel   Result Value Ref Range    Sodium 141 136 - 145 mmol/L    Potassium 4.6 3.5 - 5.0 mmol/L    Chloride 106 98 - 107 mmol/L    CO2 25 22 - 31 mmol/L    Anion Gap, Calculation 10 5 - 18 mmol/L    Glucose 88 70 - 125 mg/dL    BUN 18 8 - 28 mg/dL    Creatinine 1.02 0.70 - 1.30 mg/dL    GFR MDRD Af Amer >60 >60 mL/min/1.73m2    GFR MDRD Non Af Amer >60 >60 mL/min/1.73m2    Bilirubin, Total 0.8 0.0 - 1.0 mg/dL    Calcium 9.0 8.5 - 10.5 mg/dL    Protein, Total 7.4 6.0 - 8.0 g/dL    Albumin 4.1 3.5 - 5.0 g/dL    Alkaline Phosphatase 52 45 - 120 U/L    AST 21 0 - 40 U/L    ALT 11 0 - 45 U/L    Narrative    Fasting Glucose reference range is 70-99 mg/dL per  American Diabetes Association (ADA) guidelines.   Lipid Cascade   Result Value Ref Range    Cholesterol 173 <=199 mg/dL    Triglycerides 137 <=149 mg/dL    HDL Cholesterol 38 (L) >=40 mg/dL    LDL Calculated 108 <=129 mg/dL    Patient Fasting > 8hrs? Yes        Khadar, all your annual lab tests are normal    Please call with questions or contact us using [facility].    Sincerely,        Electronically signed by Ryan Santacruz MD

## 2021-06-20 NOTE — LETTER
Letter by Antonio Hawley MD at      Author: Antonio Hawley MD Service: -- Author Type: --    Filed:  Encounter Date: 2/6/2020 Status: (Other)         Rich Musa  [address] [facility] [address]             February 6, 2020         Dear Mr. Musa,    Below are the results from your recent visit:    Resulted Orders   Urinalysis-UC if Indicated   Result Value Ref Range    Color, UA Yellow Colorless, Yellow, Straw, Light Yellow    Clarity, UA Clear Clear    Glucose, UA Negative Negative    Bilirubin, UA Negative Negative    Ketones, UA Negative Negative    Specific Gravity, UA 1.025 1.005 - 1.030    Blood, UA Negative Negative    pH, UA 6.5 5.0 - 8.0    Protein, UA Negative Negative mg/dL    Urobilinogen, UA 0.2 E.U./dL 0.2 E.U./dL, 1.0 E.U./dL    Nitrite, UA Negative Negative    Leukocytes, UA Negative Negative    Narrative    Microscopic not indicated  UC not indicated   Comprehensive Metabolic Panel   Result Value Ref Range    Sodium 140 136 - 145 mmol/L    Potassium 4.2 3.5 - 5.0 mmol/L    Chloride 104 98 - 107 mmol/L    CO2 24 22 - 31 mmol/L    Anion Gap, Calculation 12 5 - 18 mmol/L    Glucose 97 70 - 125 mg/dL    BUN 17 8 - 28 mg/dL    Creatinine 1.05 0.70 - 1.30 mg/dL    GFR MDRD Af Amer >60 >60 mL/min/1.73m2    GFR MDRD Non Af Amer >60 >60 mL/min/1.73m2    Bilirubin, Total 0.7 0.0 - 1.0 mg/dL    Calcium 9.5 8.5 - 10.5 mg/dL    Protein, Total 7.6 6.0 - 8.0 g/dL    Albumin 4.4 3.5 - 5.0 g/dL    Alkaline Phosphatase 56 45 - 120 U/L    AST 28 0 - 40 U/L    ALT 18 0 - 45 U/L    Narrative    Fasting Glucose reference range is 70-99 mg/dL per  American Diabetes Association (ADA) guidelines.   HM2(CBC w/o Differential)   Result Value Ref Range    WBC 6.3 4.0 - 11.0 thou/uL    RBC 5.11 4.40 - 6.20 mill/uL    Hemoglobin 15.2 14.0 - 18.0 g/dL    Hematocrit 43.5 40.0 - 54.0 %    MCV 85 80 - 100 fL    MCH 29.7 27.0 - 34.0 pg    MCHC 34.8 32.0 - 36.0 g/dL    RDW 12.8 11.0 - 14.5 %    Platelets 190 140 - 440  thou/uL    MPV 8.7 7.0 - 10.0 fL       Liver and kidney tests are all normal.  Blood counts are normal.  Urine is normal.  Nothing to explain pain here.     Please call with questions or contact us using MyChart.    Sincerely,        Electronically signed by Antonio Hawley MD

## 2021-06-21 NOTE — LETTER
Letter by Antonio Hawley MD at      Author: Antonio Hawley MD Service: -- Author Type: --    Filed:  Encounter Date: 2/3/2021 Status: (Other)         Rich Musa  [address] [facility] [address]             February 3, 2021         Dear Mr. Musa,    Below are the results from your recent visit:    Resulted Orders   Lipid [place] FASTING   Result Value Ref Range    Cholesterol 150 <=199 mg/dL    Triglycerides 141 <=149 mg/dL    HDL Cholesterol 42 >=40 mg/dL    LDL Calculated 80 <=129 mg/dL    Patient Fasting > 8hrs? Yes        These look good Khadar.  Continue with the same dose of Lipitor.  I will put refills in.     Please call with questions or contact us using [employer].    Sincerely,        Electronically signed by Antonio Hawley MD

## 2021-06-21 NOTE — LETTER
Letter by Antonio Hawley MD at      Author: Antonio Hawley MD Service: -- Author Type: --    Filed:  Encounter Date: 5/3/2021 Status: (Other)         Rich Musa  [address] [facility] [address]             May 3, 2021         Dear Mr. Musa,    Below are the results from your recent visit:    Resulted Orders   COVID-19 Virus PCR MRF   Result Value Ref Range    COVID-19 VIRUS SPECIMEN SOURCE Nasopharyngeal     2019-nCOV       Test received-See reflex to IDDL test SARS CoV2 (COVID-19) Virus RT-PCR      Comment:        Performed and/or entered by:  [address] [address]    SARS-CoV-2 (COVID-19)-PCR   Result Value Ref Range    SARS-CoV-2 Virus Specimen Source Nasopharyngeal     SARS-CoV-2 PCR Result NEGATIVE       Comment:      SARS-CoV2 (COVID-19) RNA not detected, presumed negative.    SARS-COV-2 PCR COMMENT       Testing was performed using the [employer]pt[employer] SARS-CoV-2 Assay on the [employer]      Comment:      Instrument System. Additional information about this Emergency Use  Authorization (EUA) assay can be found via the Lab Guide.  This test should be ordered for the detection of SARS-CoV-2 in individuals who  meet SARS-CoV-2 clinical and/or epidemiological criteria. Test performance is  unknown in asymptomatic patients.  This test is for in vitro diagnostic use under the FDA EUA for laboratories  certified under CLIA to perform high complexity testing. This test has not been  FDA cleared or approved.  A negative result does not rule out the presence of PCR inhibitors in the  specimen or target RNA in concentration below the limit of detection for the  assay. The possibility of a false negative should be considered if the  patient's recent exposure or clinical presentation suggests COVID-19.  This test was validated by the [facility]  [facility]. This laboratory is certified under the Clinical  Laboratory  Improvement Amendments of 1988 (CLIA-88) as  qualified to perform high  complexity laboratory testing.    Performed and/or entered by:  [facility]  [address] [place] [address]      covid test is negative.     Please call with questions or contact us using [employer].    Sincerely,        Electronically signed by Antonio Hawley MD

## 2021-06-21 NOTE — PROGRESS NOTES
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    1. Screen for colon cancer  Next colonoscopy is due on 5/4/2019 or thereabouts  - Ambulatory referral for Colonoscopy    2. Medication management    - meloxicam (MOBIC) 15 MG tablet; Take 1 tablet by mouth daily.; Refill: 2,  used for coccyx pain that comes and goes  - HM2(CBC w/o Differential)  - Comprehensive Metabolic Panel  - Lipid Cascade    3. Obstructive sleep apnea syndrome  Uses a CPAP every night    4. Gastroesophageal reflux disease without esophagitis  10 use on omeprazole 20 mg/day  5. Mixed hyperlipidemia  He is not on any statin therapy    The patient's current medical problems were reviewed.    I have had an Advance Directives discussion with the patient.  The following health maintenance schedule was reviewed with the patient and provided in printed form in the after visit summary:   Health Maintenance   Topic Date Due     COLONOSCOPY  05/04/2019     TD 18+ HE  09/09/2019     FALL RISK ASSESSMENT  11/06/2019     ADVANCE DIRECTIVES DISCUSSED WITH PATIENT  10/31/2022     PNEUMOCOCCAL POLYSACCHARIDE VACCINE AGE 65 AND OVER  Completed     INFLUENZA VACCINE RULE BASED  Completed     PNEUMOCOCCAL CONJUGATE VACCINE FOR ADULTS (PCV13 OR PREVNAR)  Completed     ZOSTER VACCINE  Completed        Subjective:   Chief Complaint: Rich Musa is an 73 y.o. male here for an Annual Wellness visit.   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  Overall his medical problems are few and rather well-controlled.  He does see Dr. Grewal on a regular basis for his BPH and slow urinary stream.  He is on 0.4 mg of Flomax and his stream is rather slow.  I suggested that he try 0.8 mg of Flomax before seeing his urologist in 1/2019  He remains  with grandchildren around.  Otherwise offers no other interval complaints.  Overall health is quite good.    Review of Systems:   Please see above.   "The rest of the review of systems are negative for all systems.    Patient Care Team:  Ryan Santacruz MD as PCP - General     Patient Active Problem List   Diagnosis     Mixed hyperlipidemia     Esophageal Reflux     Adult Sleep Apnea     Seborrheic Dermatitis     Seborrheic Keratosis     Nephrolithiasis     Trochanteric Bursitis     Lyme Disease     Bacterial Pneumonia     No past medical history on file.   Past Surgical History:   Procedure Laterality Date     RI KNEE SCOPE,DIAGNOSTIC      Description: Arthroscopy Knee Right;  Recorded: 2011;      Family History   Problem Relation Age of Onset     Diabetes Mother      Breast cancer Mother      Diabetes Father      Kidney disease Father       age 52 nephropathy     Diabetes Sister      Cancer Brother      bladder      Social History     Social History     Marital status:      Spouse name: N/A     Number of children: N/A     Years of education: N/A     Occupational History     Not on file.     Social History Main Topics     Smoking status: Never Smoker     Smokeless tobacco: Never Used     Alcohol use No      Comment: social     Drug use: No     Sexual activity: Not on file     Other Topics Concern     Not on file     Social History Narrative      Current Outpatient Prescriptions   Medication Sig Dispense Refill     meloxicam (MOBIC) 15 MG tablet Take 1 tablet by mouth daily.  2     MULTIVITAMIN ORAL Take 1 tablet by mouth daily. Tablet.       omeprazole (PRILOSEC) 20 MG capsule TAKE ONE CAPSULE BY MOUTH ONE TIME DAILY  90 capsule 2     POLYETHYLENE GLYCOL 3350 (MIRALAX ORAL) Take by mouth.       tamsulosin (FLOMAX) 0.4 mg Cp24 Take 0.4 mg by mouth.       No current facility-administered medications for this visit.       Objective:   Vital Signs:   Visit Vitals     /78 (Patient Site: Right Arm, Patient Position: Sitting, Cuff Size: Adult Regular)     Pulse 62     Ht 5' 9.25\" (1.759 m)     Wt 204 lb (92.5 kg)     SpO2 97%     BMI 29.91 " "kg/m2        VisionScreening:  No exam data present     PHYSICAL EXAM  /78 (Patient Site: Right Arm, Patient Position: Sitting, Cuff Size: Adult Regular)  Pulse 62  Ht 5' 9.25\" (1.759 m)  Wt 204 lb (92.5 kg)  SpO2 97%  BMI 29.91 kg/m2    General Appearance:    Alert, cooperative, no distress, appears stated age   Head:    Normocephalic, without obvious abnormality, atraumatic   Eyes:     Ears:     Nose:   Nares normal, septum midline, mucosa normal, no drainage    or sinus tenderness   Throat:   Lips, mucosa, and tongue normal; teeth and gums normal   Neck:   Supple, symmetrical, trachea midline, no adenopathy;        thyroid:  No enlargement/tenderness/nodules; no carotid    bruit or JVD   Back:     Symmetric, no curvature, ROM normal, no CVA tenderness   Lungs:     Clear to auscultation bilaterally, respirations unlabored   Chest wall:    No tenderness or deformity   Heart:    Regular rate and rhythm, S1 and S2 normal, no murmur, rub    or gallop   Abdomen:     Soft, non-tender, bowel sounds active all four quadrants,     no masses, no organomegaly   Genitalia:     Rectal:     Extremities:   Extremities normal, atraumatic, no cyanosis or edema   Pulses:   2+ and symmetric all extremities   Skin:   Skin color, texture, turgor normal, no rashes or lesions   Lymph nodes:   Cervical, supraclavicular, and axillary nodes normal   Neurologic:   CNII-XII intact. Normal strength, sensation and reflexes       throughout         Assessment Results 11/6/2018   Activities of Daily Living No help needed   Instrumental Activities of Daily Living No help needed   Get Up and Go Score Less than 12 seconds   Mini Cog Total Score 5   Some recent data might be hidden     A Mini-Cog score of 0-2 suggests the possibility of dementia, score of 3-5 suggests no dementia    Identified Health Risks:     Patient's advanced directive was discussed and I am comfortable with the patient's wishes.        "

## 2021-06-21 NOTE — LETTER
Letter by Antonio Hawley MD at      Author: Antonio Hawley MD Service: -- Author Type: --    Filed:  Encounter Date: 2/3/2021 Status: (Other)         Rich Musa  [address] [facility] [address]             February 3, 2021         Dear Mr. Musa,    Below are the results from your recent visit:    Resulted Orders   Lipid [place] FASTING   Result Value Ref Range    Cholesterol 150 <=199 mg/dL    Triglycerides 141 <=149 mg/dL    HDL Cholesterol 42 >=40 mg/dL    LDL Calculated 80 <=129 mg/dL    Patient Fasting > 8hrs? Yes        These look good Khadar.  Continue with the same dose of Lipitor.  I will put refills in.     Please call with questions or contact us using [employer].    Sincerely,        Electronically signed by Antonio Hawley MD

## 2021-06-21 NOTE — PROGRESS NOTES
Dear Dr. Ryan Santacruz MD  [address] [place] [address],    Thank you for the opportunity to participate in the care of Rich Musa.     He is a 73 y.o. y/o male patient who comes to the sleep medicine clinic for his yearly CPAP follow up.    He was diagnosed with moderate obstructive sleep apnea/hypopnea (AHI=23.5)  and has been using nPAP therapy.  Current PAP settings: APAP 13.5-15 cwp.    His symptoms are improved since he started using CPAP. He is no snoring when he uses his devce. He feels more refreshed when he wakes up.    He denies any PAP intolerance. He is using the device every night and tolerates the pressure well.     30-Days Efficacy and Compliance Data  Residual AHI: 1.7  Leak: 0%  Days used > 4 hours: 100%  Average usage per night: 5 hours 48 minutes  90th percentile P: 14.7 cwp    Mask Tolerance: excellent  Skin irritation: no      Patient Active Problem List   Diagnosis     Mixed hyperlipidemia     Esophageal Reflux     Adult Sleep Apnea     Seborrheic Dermatitis     Seborrheic Keratosis     Nephrolithiasis     Trochanteric Bursitis     Lyme Disease     Bacterial Pneumonia         Past Surgical History:   Procedure Laterality Date     OR KNEE SCOPE,DIAGNOSTIC      Description: Arthroscopy Knee Right;  Recorded: 12/19/2011;       Social History     Socioeconomic History     Marital status:      Spouse name: Not on file     Number of children: Not on file     Years of education: Not on file     Highest education level: Not on file   Social Needs     Financial resource strain: Not on file     Food insecurity - worry: Not on file     Food insecurity - inability: Not on file     Transportation needs - medical: Not on file     Transportation needs - non-medical: Not on file   Occupational History     Not on file   Tobacco Use     Smoking status: Never Smoker     Smokeless tobacco: Never Used   Substance and Sexual Activity     Alcohol use: No     Comment: social      "Drug use: No     Sexual activity: Not on file   Other Topics Concern     Not on file   Social History Narrative     Not on file       Review of Systems:  General: No weight gain, no weight loss  Eyes: No vision changes  ENT: No hearing changes  Cardio: No chest pain, no nocturnal dyspnea  Respiratory: No shortness of breath, no cough  Gastrointestinal: No diarrhea, no constipation  Genitourinary: No excessive urination  Tegumentary: No rashes  Neurological: No seizures, no loss of consciousness  Endo: No heat or cold intolerance.    Current Outpatient Medications   Medication Sig Dispense Refill     MULTIVITAMIN ORAL Take 1 tablet by mouth daily. Tablet.       omeprazole (PRILOSEC) 20 MG capsule Take 1 capsule (20 mg total) by mouth daily. 90 capsule 2     POLYETHYLENE GLYCOL 3350 (MIRALAX ORAL) Take by mouth.       tamsulosin (FLOMAX) 0.4 mg Cp24 Take 0.4 mg by mouth.       No current facility-administered medications for this visit.        Allergies   Allergen Reactions     Augmentin [Amoxicillin-Pot Clavulanate] Diarrhea     Codeine-Guaifenesin      Sulfa (Sulfonamide Antibiotics)        Physical Exam:  Pulse 69   Ht 5' 9.25\" (1.759 m)   Wt 208 lb (94.3 kg)   SpO2 98%   BMI 30.49 kg/m    BMI:Body mass index is 30.49 kg/m .   GEN: NAD, obese  Neurological: Alert, oriented to time, place, and person.  Psych: normal mood, normal affect     Labs/Studies:     I reviewed the efficacy and compliance report from his device. Data summarized on the HPI.     Lab Results   Component Value Date    WBC 7.7 11/06/2018    HGB 15.2 11/06/2018    HCT 44.7 11/06/2018    MCV 87 11/06/2018     11/06/2018         Chemistry        Component Value Date/Time     11/06/2018 1038    K 4.6 11/06/2018 1038     11/06/2018 1038    CO2 26 11/06/2018 1038    BUN 21 11/06/2018 1038    CREATININE 0.90 11/06/2018 1038    GLU 92 11/06/2018 1038        Component Value Date/Time    CALCIUM 9.5 11/06/2018 1038    ALKPHOS 48 " 11/06/2018 1038    AST 19 11/06/2018 1038    ALT 13 11/06/2018 1038    BILITOT 1.0 11/06/2018 1038            No results found for: FERRITIN        Assessment and Plan:  In summary Rich Musa is a 73 y.o. year old male who is here for follow up.    1. Obstructive Sleep Apnea.  Residual AHI is excellent  Compliance is excellent  Patient is benefiting from using PAP therapy.  Continue with APAP 13.5-15.0 cwp.  I will write a new prescription for supplies.  We counseled the patient on the importannce of using his PAP device every night and the risks of not treating sleep apnea.   He had some questions about sleep hygiene because he is falling asleep early in the evening and this limits his sleep period at night. I reviewed sleep hygiene with him ad advised him to find a way to stay alert part his usual nap time in the evening. He can also take a limited schedule nap earlier in the day.      Patient verbalized understanding of these issues, agrees with the plan and all questions were answered today. Patient was given an opportuntity to voice any other symptoms or concerns not listed above. Patient did not have any other symptoms or concerns.      Patient told to return in 12 months.     I explained to the patient that I will be transitioning to a different job soon. I reassured him that this transition should not cause any significant disruptions to his care. I provided some information on the process and encouraged him to contact our main number if he has any questions or needs any help.    MD KLAUS Levi Board Certified in Internal Medicine and Sleep Medicine  [facility].    We spent a total of 25 minutes and more than 50% of the time was used to  the patient about sleep apnea and sleep hygiene.

## 2021-06-21 NOTE — PROGRESS NOTES
Order for Durable Medical Equipment was processed and equipment ordered.     DME provider: [facility]    Date Faxed: 11/26/18    Ordering Provider: Dr. Dave    Equipment ordered: Mask fitting/CPAP supplies    Fax Number: [phone]

## 2021-06-21 NOTE — LETTER
Letter by Antonio Hawley MD at      Author: Antonio Hawley MD Service: -- Author Type: --    Filed:  Encounter Date: 11/12/2020 Status: (Other)         Rich Musa  [address] [facility] [address]             November 12, 2020         Dear Mr. Musa,    Below are the results from your recent visit:    Resulted Orders   Comprehensive Metabolic Panel   Result Value Ref Range    Sodium 141 136 - 145 mmol/L    Potassium 4.4 3.5 - 5.0 mmol/L    Chloride 105 98 - 107 mmol/L    CO2 25 22 - 31 mmol/L    Anion Gap, Calculation 11 5 - 18 mmol/L    Glucose 98 70 - 125 mg/dL    BUN 16 8 - 28 mg/dL    Creatinine 1.00 0.70 - 1.30 mg/dL    GFR MDRD Af Amer >60 >60 mL/min/1.73m2    GFR MDRD Non Af Amer >60 >60 mL/min/1.73m2    Bilirubin, Total 0.5 0.0 - 1.0 mg/dL    Calcium 9.4 8.5 - 10.5 mg/dL    Protein, Total 7.6 6.0 - 8.0 g/dL    Albumin 4.3 3.5 - 5.0 g/dL    Alkaline Phosphatase 52 45 - 120 U/L    AST 24 0 - 40 U/L    ALT 17 0 - 45 U/L    Narrative    Fasting Glucose reference range is 70-99 mg/dL per  American Diabetes Association (ADA) guidelines.   HM2(CBC w/o Differential)   Result Value Ref Range    WBC 4.4 4.0 - 11.0 thou/uL    RBC 5.16 4.40 - 6.20 mill/uL    Hemoglobin 15.4 14.0 - 18.0 g/dL    Hematocrit 45.1 40.0 - 54.0 %    MCV 87 80 - 100 fL    MCH 29.8 27.0 - 34.0 pg    MCHC 34.1 32.0 - 36.0 g/dL    RDW 12.0 11.0 - 14.5 %    Platelets 179 140 - 440 thou/uL    MPV 8.7 7.0 - 10.0 fL   Urinalysis-UC if Indicated   Result Value Ref Range    Color, UA Yellow Colorless, Yellow, Straw, Light Yellow    Clarity, UA Clear Clear    Glucose, UA Negative Negative    Bilirubin, UA Negative Negative    Ketones, UA Negative Negative    Specific Gravity, UA 1.020 1.005 - 1.030    Blood, UA Negative Negative    pH, UA 7.0 5.0 - 8.0    Protein, UA Negative Negative mg/dL    Urobilinogen, UA 0.2 E.U./dL 0.2 E.U./dL, 1.0 E.U./dL    Nitrite, UA Negative Negative    Leukocytes, UA Negative Negative    Narrative     "Microscopic not indicated  UC not indicated   Lipid Cascade   Result Value Ref Range    Cholesterol 175 <=199 mg/dL    Triglycerides 119 <=149 mg/dL    HDL Cholesterol 43 >=40 mg/dL    LDL Calculated 108 <=129 mg/dL    Patient Fasting > 8hrs? Yes        Khadar, your labs here look good.  I do want to point out your cholesterol however.  Although your numbers are all considered to be \"normal\", given your age and male sex, your 10-year risk of having a cardiac event is nearly 25%.  This is high enough that current recommendations would urge you to consider taking a cholesterol-lowering medicine for prevention of heart attack and stroke.  I do not know if Dr. Santacruz ever discussed this with you in the past.  Would this be something that we could get started for you?  I would recommend a low-dose of Lipitor or Crestor.  Both are generic and fairly well-tolerated.     Please call with questions or contact us using [employer].    Sincerely,        Electronically signed by Antonio Hawley MD       "

## 2021-06-26 NOTE — PROGRESS NOTES
Office Visit - Follow Up   Rich Musa   76 y.o. male    Date of Visit: 6/7/2021    Chief Complaint   Patient presents with     Ear Fullness        Assessment and Plan   1. Impacted cerumen of right ear  Removed with warm water lavage and microcurette improved.    2. Conductive hearing loss of right ear, unspecified hearing status on contralateral side  Hearing improved with removal of cerumen    Return in about 4 weeks (around 7/5/2021) for office visit, if symptoms worsen/fail to improve.     History of Present Illness   This 76 y.o. old man comes in with fullness and hearing loss in the right ear.  He wonders if he has earwax.  Has had this before.  Otherwise feeling well no complaints    Review of Systems: A comprehensive review of systems was negative except as noted.     Medications, Allergies and Problem List   Reviewed, reconciled and updated  Post Discharge Medication Reconciliation Status:      Physical Exam   General Appearance:       /70 (Patient Site: Left Arm, Patient Position: Sitting, Cuff Size: Adult Large)   Pulse 66   Wt 210 lb (95.3 kg)   SpO2 97%   BMI 31.01 kg/m      Left ear unremarkable right ear with cerumen impaction.  This is removed with a warm water lavage and microcurette.  Improvement in hearing after removal Intermatic membrane with mild erythema otherwise unremarkable     Additional Information   Current Outpatient Medications   Medication Sig Dispense Refill     atorvastatin (LIPITOR) 10 MG tablet Take 1 tablet (10 mg total) by mouth daily. 90 tablet 3     calcium cit/Mgox/vit D3/B6/min (CALCIUM CITRATE + D WITH MAG ORAL) Take 2 tablets by mouth daily.        multivit with minerals/lutein (MULTIVITAMIN 50 PLUS ORAL) Take 1 tablet by mouth daily.       omeprazole (PRILOSEC) 20 MG capsule Take 1 capsule (20 mg total) by mouth daily before breakfast. 90 capsule 3     polyethylene glycol (MIRALAX) 17 gram packet Take 17 g by mouth daily.       tamsulosin (FLOMAX) 0.4 mg  Cp24 Take 0.4 mg by mouth.       No current facility-administered medications for this visit.      Allergies   Allergen Reactions     Sulfa (Sulfonamide Antibiotics)      Social History     Tobacco Use     Smoking status: Never Smoker     Smokeless tobacco: Never Used   Substance Use Topics     Alcohol use: No     Comment: social     Drug use: No       Review and/or order of clinical lab tests:  Review and/or order of radiology tests:  Review and/or order of medicine tests:  Discussion of test results with performing physician:  Decision to obtain old records and/or obtain history from someone other than the patient:  Review and summarization of old records and/or obtaining history from someone other than the patient and.or discussion of case with another health care provider:  Independent visualization of image, tracing or specimen itself:    Time:      Matthew Chin MD

## 2021-06-26 NOTE — PROGRESS NOTES
Progress Notes by Ruiz Minor PA-C at 7/27/2018 10:30 AM     Author: Ruiz Minor PA-C Service: -- Author Type: Physician Assistant    Filed: 7/27/2018 12:10 PM Encounter Date: 7/27/2018 Status: Signed    : Ruiz Minor PA-C (Physician Assistant)          Assessment and Plan     Rich was seen today for ear fullness.    Diagnoses and all orders for this visit:    Bilateral impacted cerumen  -     Nursing communication         HPI     Chief Complaint   Patient presents with   ? Ear Fullness     wax- x1 week       Rich Musa is a 73 y.o. male seen today for diminished hearing in his right ear.  He gets his ears flushed at least once per year.  Denies pain or discomfort in either ear.  Denies recent fever, chills, or illness.       Current Outpatient Prescriptions:   ?  MULTIVITAMIN ORAL, Take 1 tablet by mouth daily. Tablet., Disp: , Rfl:   ?  omeprazole (PRILOSEC) 20 MG capsule, TAKE ONE CAPSULE BY MOUTH ONE TIME DAILY , Disp: 90 capsule, Rfl: 2  ?  POLYETHYLENE GLYCOL 3350 (MIRALAX ORAL), Take by mouth., Disp: , Rfl:   ?  tamsulosin (FLOMAX) 0.4 mg Cp24, Take 0.4 mg by mouth., Disp: , Rfl:      Reviewed and updated: medical history, medications and allergies.     Review of Systems     General: Denies fever, chills, fatigue.  ENT: Decreased hearing in right ear.     Objective     Vitals:    07/27/18 1055   BP: 110/80   Patient Site: Right Arm   Patient Position: Sitting   Cuff Size: Adult Regular   Pulse: 72   Resp: 14   Temp: 98.3  F (36.8  C)   SpO2: 97%   Weight: 208 lb 6.4 oz (94.5 kg)        Reviewed vital signs.  General: Appears calm, comfortable. Answers questions quickly and appropriately with clear speech. No apparent distress.  Skin: Pink, warm, dry.  HENT: Normocephalic, atraumatic.  Right canal completely impacted with cerumen.  Left canal partially obstructed with cerumen.  Left TM clear.  After lavage, TMs and canals clear bilaterally.  Neck: Supple.  Cardiovascular:  Strong, regular radial pulse.  Respiratory: Normal respiratory effort.  Neuro: Memory and cognition appear normal. Normal gait.  Psych: Mood and affect appear normal.     Imaging:   No results found.    Labs:  No results found for this or any previous visit (from the past 24 hour(s)).     Medical Decision-Making     Rich is generally well-appearing 73-year-old male presents requesting bilateral ear lavage.  Acknowledges decreased hearing in right ear.  States he has to get this done every year.  Appearance is nontoxic.  He is not tachycardic, tachypneic, or febrile.  No discharge from either ear.  No pain on manipulation of either pinna.  Bilateral ear lavage was performed with plain water.  Post lavage, both canals were verified to be clear and TMs appeared healthy.  Patient tolerated the procedure well and was happy with results.  States his hearing feels better.    Reviewed red flags that would trigger a prompt return to the clinic as noted below under patient instructions.  He expressed understanding of these directions and is in agreement with the plan.     Patient Instructions     Patient Instructions       Impacted Earwax     Inner ear structures including ear canal and eardrum.     Impacted earwax is a buildup of the natural wax in the ear (cerumen). Impacted earwax is very common. It can cause symptoms such as hearing loss. It can also make it difficult for a doctor to examine your ear.  Understanding earwax  Tiny glands in your ear make substances that combine with dead skin cells to form earwax. Earwax helps protect your ear canal from water, dirt, infection, and injury. Over time, earwax travels from the inner part of your ear canal to the entrance of the canal. Then it falls away naturally. But in some cases, it cant travel to the entrance of the canal. This may be because of a health condition or objects put in the ear. With age, earwax tends to become harder and less fluid. Older adults are more  likely to have problems with earwax buildup.  What causes impacted earwax?  Earwax can build up because of many health conditions. Some cause a physical blockage. Others cause too much earwax to be made. Health conditions that can cause earwax buildup include:    Use of cotton swabs to clean deep in the ear canal    Bony blockage in the ear (osteoma or exostoses)    Infections, such as  infection of the outer ear (external otitis)    Skin disease, such as eczema    Autoimmune diseases, such as lupus    A narrowed ear canal from birth, chronic inflammation, or injury    Too much earwax because of injury    Too much earwax because of  water in the ear canal  Objects repeatedly placed in the ear can also cause impacted earwax. For example, putting cotton swabs in the ear may push the wax deeper into the ear. Over time, this may cause blockage. Hearing aids, swimming plugs, and swim molds can cause the same problem when used again and again.  In some cases, the cause of impacted earwax is not known.  Symptoms of impacted earwax  Excess earwax usually does not cause any symptoms, unless there is a large amount of buildup. Then it may cause symptoms such as:    Hearing loss    Earache    Sense of ear fullness    Itching in the ear    Odor from the ear    Ear drainage    Dizziness    Ringing in the ears    Cough  Treatment for impacted earwax  If you dont have symptoms, you may not need treatment. Often, the earwax goes away on its own with time. If you have symptoms, you may have one or more treatments such as:    Eardrops to soften the earwax. This helps it leave the ear over time.    Rinsing (irrigation) of the ear canal with water. This is done in a doctors office.    Removal of the earwax with small tools. This is also done in a doctors office.  In rare cases, some treatments for earwax removal may cause complications such as:    Infection of the outer ear (otitis external)    Earache    Short-term hearing  loss    Dizziness    Water trapped in the ear canal    Hole in the eardrum    Ringing in the ears    Bleeding from the ear  Talk with your healthcare provider about which risks apply most to you.  Dont use these at home  Healthcare providers do not advise use of ear candles or ear vacuum kits. These methods are not shown to work and may cause problems.   Preventing impacted earwax  You may not be able to prevent impacted earwax if you have a health condition that causes it, such as eczema. In other cases, you may be able to prevent earwax buildup by:    Using ear drops once a week    Having routine cleaning of the ear about every 6 months    Not using cotton swabs in the ear  When to call the healthcare provider  Call your healthcare provider if you have symptoms of impacted earwax. Also call right away if you have severe symptoms after earwax removal. These may include bleeding or severe ear pain.   Date Last Reviewed: 5/1/2017 2000-2017 [employer]. [address] [place] [address]. All rights reserved. This information is not intended as a substitute for professional medical care. Always follow your healthcare professional's instructions.            Discussed benefit vs risk of medications, dosing, side effects.  Patient was able to verbalize understanding.  After visit summary was provided for patient.     Agustin Minor PA-C

## 2021-06-28 NOTE — PROGRESS NOTES
Progress Notes by Soni Iglesias AuD at 10/17/2019  9:30 AM     Author: Soni Iglesias AuD Service: -- Author Type: Audiologist    Filed: 10/17/2019 10:46 AM Encounter Date: 10/17/2019 Status: Signed    : Soni Iglesias AuD (Audiologist)       Audiology Report:    Referring Provider:  Dr. Santacruz    History:  Rich Musa is seen today for comprehensive hearing evaluation. He has a history of normal to mild high frequency sensorineural hearing loss in his left ear and normal to moderately-severe sensorineural hearing loss in his right ear. Rich saw Dr. Castro ENT in 2012 and he recommended an MRI which came back normal.     Today, Rich reports he has trouble hearing in noisy settings. He reports he has issues with ear wax especially in his right ear. He recently had his right ear cleaned at the doctor's office. Rich reportedly experiences constant ringing tinnitus in both ears. He denies a history of otalgia, otorrhea, aural fullness, dizziness, ear surgery, noise exposure, and family history of hearing loss.    Results:     Left Ear Right Ear   Otoscopy non-occluding cerumen non-occluding cerumen   Pure Tone Audiometry normal hearing from 250-2000 Hz sloping to moderately-severe sensorineural hearing loss  normal hearing from 250-2000 Hz sloping to severe sensorineural hearing loss    Word Recognition excellent excellent   Tympanometry Could not seal  Could not seal     Transducer: Insert earphones and Circumaural headphones    Reliability was good  and there was good  SRT to PTA agreement. These results show a decline in high frequency hearing in both ears compared to results from 11/28/12.      Plan:  Results are discussed in detail.  He should return for retesting annually. Rich is a borderline candidate for hearing aids if motivated. He believes he has some insurance coverage for hearing aids in 2020 so he may consider pursuing devices next year.    Please see audiogram below or under  media and audiogram in the patients chart.     Lorena Rivera, [facility]  [place] Licensed Audiologist #[id number]

## 2021-07-03 NOTE — ADDENDUM NOTE
Addendum Note by Karen Cheema MA at 11/12/2020  4:45 PM     Author: Karen Cheema MA Service: -- Author Type: Medical Assistant    Filed: 11/12/2020  4:45 PM Encounter Date: 11/12/2020 Status: Signed    : Karen Cheema MA (Medical Assistant)    Addended by: KAREN CHEEMA on: 11/12/2020 04:45 PM        Modules accepted: Orders

## 2021-07-03 NOTE — ADDENDUM NOTE
Addendum Note by Leo Dave MD at 11/26/2018 11:40 AM     Author: Leo Dave MD Service: -- Author Type: Physician    Filed: 11/26/2018 12:10 PM Encounter Date: 11/26/2018 Status: Signed    : Leo Dave MD (Physician)    Addended by: LEO DAVE on: 11/26/2018 12:10 PM        Modules accepted: Level of Service

## 2021-07-06 VITALS
OXYGEN SATURATION: 97 % | BODY MASS INDEX: 31.01 KG/M2 | SYSTOLIC BLOOD PRESSURE: 122 MMHG | HEART RATE: 66 BPM | DIASTOLIC BLOOD PRESSURE: 70 MMHG | WEIGHT: 210 LBS

## 2021-08-02 ENCOUNTER — OFFICE VISIT (OUTPATIENT)
Dept: INTERNAL MEDICINE | Facility: CLINIC | Age: 76
End: 2021-08-02
Payer: COMMERCIAL

## 2021-08-02 VITALS
WEIGHT: 201.6 LBS | HEART RATE: 80 BPM | DIASTOLIC BLOOD PRESSURE: 84 MMHG | BODY MASS INDEX: 29.77 KG/M2 | SYSTOLIC BLOOD PRESSURE: 130 MMHG

## 2021-08-02 DIAGNOSIS — M79.10 MYALGIA: Primary | ICD-10-CM

## 2021-08-02 LAB — ERYTHROCYTE [SEDIMENTATION RATE] IN BLOOD BY WESTERGREN METHOD: 13 MM/HR (ref 0–15)

## 2021-08-02 PROCEDURE — 85652 RBC SED RATE AUTOMATED: CPT | Performed by: INTERNAL MEDICINE

## 2021-08-02 PROCEDURE — 36415 COLL VENOUS BLD VENIPUNCTURE: CPT | Performed by: INTERNAL MEDICINE

## 2021-08-02 PROCEDURE — 99214 OFFICE O/P EST MOD 30 MIN: CPT | Performed by: INTERNAL MEDICINE

## 2021-08-02 PROCEDURE — 86618 LYME DISEASE ANTIBODY: CPT | Performed by: INTERNAL MEDICINE

## 2021-08-02 RX ORDER — ATORVASTATIN CALCIUM 10 MG/1
10 TABLET, FILM COATED ORAL DAILY
COMMUNITY
Start: 2021-08-01 | End: 2022-02-01

## 2021-08-02 NOTE — PROGRESS NOTES
Assessment/Plan:    Myalgias with arthralgias.  Check sed rate plus Lyme disease antibody test today.  Try arthritis strength acetaminophen 650 mg tablets take 2 tablets 3 times a day on a scheduled basis.  Check with Dr. MILTON his PCP within the next 24 to 48 hours.  Consider polymyalgia rheumatica.  Covid test recently done yesterday negative.  PCR.  Previous history of Lyme disease.    25 minutes spent on the date of the encounter doing chart review, patient visit and documentation     Subjective:  Rich Musa is a 76 year old male presents for the following health issues severe pain in joints and muscles.  No rash.  Prior history of Lyme disease.  Abrupt onset this past weekend.  Yesterday had PCR test for Covid negative.  No fever no nausea or vomiting no chest pain or cough shortness of breath.  No loss of taste or smell.  No upper respiratory infection-like symptomatology.    Retired teacher.  Previously had had Lyme disease.  No tick bite or rash.    ROS:  No blood in stool or urine no melena medication list reviewed reconciled.  No fever chills no nausea vomiting or diarrhea.  Medication list reviewed reconciled.    Objective:  /84 (BP Location: Right arm, Patient Position: Sitting, Cuff Size: Adult Regular)   Pulse 80   Wt 91.4 kg (201 lb 9.6 oz)   BMI 29.77 kg/m    Chest clear to auscultation and percussion.  Heart tones regular rhythm without murmur rub or gallop.  Abdomen soft nontender no organomegaly.  No peritoneal signs.  Extremities free of edema cyanosis or clubbing.  Neck veins nondistended no thyromegaly or scleral icterus noted, carotids full.  Skin warm and dry easily conversant good spirited.  Normal intelligence.  Neurologically intact no gross localizing findings.

## 2021-08-02 NOTE — LETTER
August 2, 2021      Rich Musa  [address] [address]        Dear ,    We are writing to inform you of your test results.    All very good     Resulted Orders   Erythrocyte sedimentation rate auto   Result Value Ref Range    Erythrocyte Sedimentation Rate 13 0 - 15 mm/hr       If you have any questions or concerns, please call the clinic at the number listed above.       Sincerely,      Kyaw Eisenberg MD

## 2021-08-03 ENCOUNTER — TELEPHONE (OUTPATIENT)
Dept: FAMILY MEDICINE | Facility: CLINIC | Age: 76
End: 2021-08-03

## 2021-08-03 LAB — B BURGDOR IGG+IGM SER QL: 0.06

## 2021-08-03 NOTE — TELEPHONE ENCOUNTER
----- Message from Kyaw Eisenberg MD sent at 8/3/2021 10:06 AM CDT -----  No serologic evidence for Lyme disease.

## 2021-08-27 ENCOUNTER — TELEPHONE (OUTPATIENT)
Dept: SLEEP MEDICINE | Facility: CLINIC | Age: 76
End: 2021-08-27

## 2021-08-27 NOTE — TELEPHONE ENCOUNTER
RETURNED PT CALL AND WALKED THE PT THROUGH HOW TO TURN OFF THE RAMP SETTING ON HIS DEVICE. PT ALSO STATED THAT HE TURNED OFF THE HTD TUBE FEATURE AND NOW IT WILL NOT GIVE HIM THE OPTION TO TURN IT BACK ON. TRIED TROUBLESHOOTING THE PT HTD TUBE OVER THE PHONE WITH NO SUCCESS. PT SCHEDULED AT THE [facility] SHOWROOM 08/31/2021 10AM. DREAMSTATION 2 S/N: [id number] ENTERED INTO [employer].

## 2021-08-31 ENCOUNTER — DOCUMENTATION ONLY (OUTPATIENT)
Dept: SLEEP MEDICINE | Facility: CLINIC | Age: 76
End: 2021-08-31

## 2021-08-31 NOTE — PROGRESS NOTES
8/-31/21 JL- PT CAME TO [facility] WITH HEATED TUBING ICON NOT SHOWING UP ON DISPLAY ON DS2.  I COMPLETED A DEFECTIVE EQUIPMENT FORM FOR DS2 TUBING AND ISSUED HIM NEW HEATED DS2 TUBING, KELECHION IS NOW PRESENT WITH NEW TUBING.  PER PT REQUEST MACHINE CHECKED WITH MANOMETER TO CONFIRM STARTING PRESSURE AT 13.5.

## 2021-09-02 ENCOUNTER — TRANSFERRED RECORDS (OUTPATIENT)
Dept: HEALTH INFORMATION MANAGEMENT | Facility: CLINIC | Age: 76
End: 2021-09-02

## 2021-09-18 ENCOUNTER — HEALTH MAINTENANCE LETTER (OUTPATIENT)
Age: 76
End: 2021-09-18

## 2021-10-05 ENCOUNTER — IMMUNIZATION (OUTPATIENT)
Dept: NURSING | Facility: CLINIC | Age: 76
End: 2021-10-05
Payer: COMMERCIAL

## 2021-10-05 PROCEDURE — 91300 PR COVID VAC PFIZER DIL RECON 30 MCG/0.3 ML IM: CPT

## 2021-10-05 PROCEDURE — 0004A PR COVID VAC PFIZER DIL RECON 30 MCG/0.3 ML IM: CPT

## 2021-11-03 ASSESSMENT — SLEEP AND FATIGUE QUESTIONNAIRES
HOW LIKELY ARE YOU TO NOD OFF OR FALL ASLEEP WHILE SITTING QUIETLY AFTER LUNCH WITHOUT ALCOHOL: WOULD NEVER DOZE
HOW LIKELY ARE YOU TO NOD OFF OR FALL ASLEEP WHILE WATCHING TV: HIGH CHANCE OF DOZING
HOW LIKELY ARE YOU TO NOD OFF OR FALL ASLEEP IN A CAR, WHILE STOPPED FOR A FEW MINUTES IN TRAFFIC: WOULD NEVER DOZE
HOW LIKELY ARE YOU TO NOD OFF OR FALL ASLEEP WHILE SITTING AND READING: MODERATE CHANCE OF DOZING
HOW LIKELY ARE YOU TO NOD OFF OR FALL ASLEEP WHILE SITTING AND TALKING TO SOMEONE: WOULD NEVER DOZE
HOW LIKELY ARE YOU TO NOD OFF OR FALL ASLEEP WHEN YOU ARE A PASSENGER IN A CAR FOR AN HOUR WITHOUT A BREAK: WOULD NEVER DOZE
HOW LIKELY ARE YOU TO NOD OFF OR FALL ASLEEP WHILE LYING DOWN TO REST IN THE AFTERNOON WHEN CIRCUMSTANCES PERMIT: MODERATE CHANCE OF DOZING
HOW LIKELY ARE YOU TO NOD OFF OR FALL ASLEEP WHILE SITTING INACTIVE IN A PUBLIC PLACE: WOULD NEVER DOZE

## 2021-11-05 ENCOUNTER — VIRTUAL VISIT (OUTPATIENT)
Dept: SLEEP MEDICINE | Facility: CLINIC | Age: 76
End: 2021-11-05
Payer: COMMERCIAL

## 2021-11-05 VITALS — HEIGHT: 70 IN | BODY MASS INDEX: 29.35 KG/M2 | WEIGHT: 205 LBS

## 2021-11-05 DIAGNOSIS — G47.33 OBSTRUCTIVE SLEEP APNEA: Primary | ICD-10-CM

## 2021-11-05 PROCEDURE — 99212 OFFICE O/P EST SF 10 MIN: CPT | Mod: 95 | Performed by: INTERNAL MEDICINE

## 2021-11-05 ASSESSMENT — MIFFLIN-ST. JEOR: SCORE: 1668.18

## 2021-11-05 NOTE — PATIENT INSTRUCTIONS
Your BMI is Body mass index is 29.84 kg/m .  Weight management is a personal decision.  If you are interested in exploring weight loss strategies, the following discussion covers the approaches that may be successful. Body mass index (BMI) is one way to tell whether you are at a healthy weight, overweight, or obese. It measures your weight in relation to your height.  A BMI of 18.5 to 24.9 is in the healthy range. A person with a BMI of 25 to 29.9 is considered overweight, and someone with a BMI of 30 or greater is considered obese. More than two-thirds of American adults are considered overweight or obese.  Being overweight or obese increases the risk for further weight gain. Excess weight may lead to heart disease and diabetes.  Creating and following plans for healthy eating and physical activity may help you improve your health.  Weight control is part of healthy lifestyle and includes exercise, emotional health, and healthy eating habits. Careful eating habits lifelong are the mainstay of weight control. Though there are significant health benefits from weight loss, long-term weight loss with diet alone may be very difficult to achieve- studies show long-term success with dietary management in less than 10% of people. Attaining a healthy weight may be especially difficult to achieve in those with severe obesity. In some cases, medications, devices and surgical management might be considered.  What can you do?  If you are overweight or obese and are interested in methods for weight loss, you should discuss this with your provider.     Consider reducing daily calorie intake by 500 calories.     Keep a food journal.     Avoiding skipping meals, consider cutting portions instead.    Diet combined with exercise helps maintain muscle while optimizing fat loss. Strength training is particularly important for building and maintaining muscle mass. Exercise helps reduce stress, increase energy, and improves fitness.  Increasing exercise without diet control, however, may not burn enough calories to loose weight.       Start walking three days a week 10-20 minutes at a time    Work towards walking thirty minutes five days a week     Eventually, increase the speed of your walking for 1-2 minutes at time    In addition, we recommend that you review healthy lifestyles and methods for weight loss available through the National Institutes of Health patient information sites:  http://win.niddk.nih.gov/publications/index.htm    And look into health and wellness programs that may be available through your health insurance provider, employer, local community center, or chencho club.

## 2021-11-05 NOTE — PROGRESS NOTES
Will patient be in the state of [place] at time of video visit: YES     Khadar is a 76 year old who is being evaluated via a billable video visit.      How would you like to obtain your AVS? MyChart  If the video visit is dropped, the invitation should be resent by: EMAIL: [email or web address]  Will anyone else be joining your video visit? No    Video Start Time: 2:48 PM  Video-Visit Details    Type of service:  Video Visit    Video End Time:2:57 PM    Originating Location (pt. Location): Home    Distant Location (provider location):  [facility] [place]     Platform used for Video Visit: [employer]     Additional 10 minutes on the date of service was spent performing the following:    -Preparing to see the patient  -Ordering medications, tests, or procedures   -Documenting clinical information in the electronic or other health record     Thank you for the opportunity to participate in the care of Rich Musa.     He is a 76 year old y/o male patient who comes to the sleep medicine clinic for follow up.  The patient was diagnosed with BRYN on 02/02/2005 (AHI=23.5). He continues to benefit from CPAP therapy. He would just like a prescription to continue to get supplies.     Assessment and Plan:  In summary Rich Musa is a 76 year old year old male who is here for follow up.    1. Obstructive sleep apnea  I congratulated the patient on his excellent CPAP usage. I will keep him on the same pressure settings for now.  - COMPREHENSIVE DME    Compliance Download data for 30 Days:  Pressure setting:APAP 13.5-15 cwp  95% pressure:15 cwp  Residual AHI:2.2 events per hour  Leak:Minimal  Compliance:100%  Mask Tolerance:Good  Skin irritation:None  DME:[facility]      Sleep-Wake Cycle:     The patient likes to initiate sleep at around 10-10:30 PM with a sleep latency of 10-15 minutes. The patient has 1 nocturnal awakenings. Final wake up time is around 4-6 AM.    ASHU:  ASHU Total Score: 10  Total score  - Tarkio: 7 (11/3/2021  7:06 AM)        Patient Active Problem List   Diagnosis     Dyslipidemia     GERD with stricture     BRYN on CPAP     Seborrheic Dermatitis     Seborrheic Keratosis     Nephrolithiasis     Trochanteric Bursitis     Benign prostatic hyperplasia with urinary hesitancy     Plantar fasciitis       Past Medical History:   Diagnosis Date     BPH (benign prostatic hyperplasia)      BRYN (obstructive sleep apnea)        Past Surgical History:   Procedure Laterality Date     HC KNEE SCOPE, DIAGNOSTIC      Description: Arthroscopy Knee Right;  Recorded: 12/19/2011;       Social History     Socioeconomic History     Marital status:      Spouse name: Not on file     Number of children: Not on file     Years of education: Not on file     Highest education level: Not on file   Occupational History     Not on file   Tobacco Use     Smoking status: Never Smoker     Smokeless tobacco: Never Used   Substance and Sexual Activity     Alcohol use: No     Comment: Alcoholic Drinks/day: social     Drug use: No     Sexual activity: Not on file   Other Topics Concern     Not on file   Social History Narrative     Not on file     Social Determinants of Health     Financial Resource Strain:      Difficulty of Paying Living Expenses:    Food Insecurity:      Worried About Running Out of Food in the Last Year:      Ran Out of Food in the Last Year:    Transportation Needs:      Lack of Transportation (Medical):      Lack of Transportation (Non-Medical):    Physical Activity:      Days of Exercise per Week:      Minutes of Exercise per Session:    Stress:      Feeling of Stress :    Social Connections:      Frequency of Communication with Friends and Family:      Frequency of Social Gatherings with Friends and Family:      Attends Taoist Services:      Active Member of Clubs or Organizations:      Attends Club or Organization Meetings:      Marital Status:    Intimate Partner Violence:      Fear of Current or  Ex-Partner:      Emotionally Abused:      Physically Abused:      Sexually Abused:        Current Outpatient Medications   Medication Sig Dispense Refill     atorvastatin (LIPITOR) 10 MG tablet        Calcium Citrate-Vitamin D (CALCIUM CITRATE +D PO)        Multiple Vitamins-Minerals (MULTIVITAMIN ADULT PO) Take 1 tablet by mouth       omeprazole (PRILOSEC) 20 MG DR capsule        tamsulosin (FLOMAX) 0.4 MG capsule Take 0.4 mg by mouth         Allergies   Allergen Reactions     Sulfa Drugs        Physical Exam:  GEN: NAD,  Psych: normal mood, normal affect    Labs/Studies:    No results found for: [place]    I reviewed the efficacy and compliance report from his device. Data summarized on the HPI and the PAP compliance flow sheet.     Patient verbalized understanding of these issues, agrees with the plan and all questions were answered today. Patient was given an opportuntity to voice any other symptoms or concerns not listed above. Patient did not have any other symptoms or concerns.      David Mathews DO  Board Certified in Internal Medicine and Sleep Medicine    (Note created with Dragon voice recognition and unintended spelling errors and word substitutions may occur)     Audio and visual devices were used for this virtual clinic visit with permission from patient.

## 2021-11-18 DIAGNOSIS — K21.9 GERD WITH STRICTURE: Primary | ICD-10-CM

## 2021-11-18 DIAGNOSIS — K22.2 GERD WITH STRICTURE: Primary | ICD-10-CM

## 2021-11-20 NOTE — TELEPHONE ENCOUNTER
"  Last office visit provider:  6/7/21     Requested Prescriptions   Pending Prescriptions Disp Refills     omeprazole (PRILOSEC) 20 MG DR capsule [Pharmacy Med Name: Omeprazole Oral Capsule Delayed Release 20 MG] 90 capsule 0     Sig: Take 1 capsule (20 mg total) by mouth daily before breakfast.       PPI Protocol Passed - 11/18/2021  7:18 AM        Passed - Not on Clopidogrel (unless Pantoprazole ordered)        Passed - No diagnosis of osteoporosis on record        Passed - Recent (12 mo) or future (30 days) visit within the authorizing provider's specialty     Patient has had an office visit with the authorizing provider or a provider within the authorizing providers department within the previous 12 mos or has a future within next 30 days. See \"Patient Info\" tab in inbasket, or \"Choose Columns\" in Meds & Orders section of the refill encounter.              Passed - Medication is active on med list        Passed - Patient is age 18 or older             tressa oliver RN 11/20/21 5:13 AM  "

## 2021-12-13 ASSESSMENT — ACTIVITIES OF DAILY LIVING (ADL): CURRENT_FUNCTION: NO ASSISTANCE NEEDED

## 2021-12-16 ENCOUNTER — OFFICE VISIT (OUTPATIENT)
Dept: INTERNAL MEDICINE | Facility: CLINIC | Age: 76
End: 2021-12-16
Payer: COMMERCIAL

## 2021-12-16 VITALS
SYSTOLIC BLOOD PRESSURE: 124 MMHG | TEMPERATURE: 98.2 F | OXYGEN SATURATION: 98 % | BODY MASS INDEX: 31.37 KG/M2 | HEIGHT: 68 IN | DIASTOLIC BLOOD PRESSURE: 80 MMHG | WEIGHT: 207 LBS | HEART RATE: 68 BPM

## 2021-12-16 DIAGNOSIS — K21.9 GERD WITH STRICTURE: ICD-10-CM

## 2021-12-16 DIAGNOSIS — N40.1 BENIGN PROSTATIC HYPERPLASIA WITH URINARY HESITANCY: ICD-10-CM

## 2021-12-16 DIAGNOSIS — K22.2 GERD WITH STRICTURE: ICD-10-CM

## 2021-12-16 DIAGNOSIS — H61.23 BILATERAL IMPACTED CERUMEN: ICD-10-CM

## 2021-12-16 DIAGNOSIS — Z00.00 ENCOUNTER FOR MEDICARE ANNUAL WELLNESS EXAM: Primary | ICD-10-CM

## 2021-12-16 DIAGNOSIS — G47.33 OSA ON CPAP: ICD-10-CM

## 2021-12-16 DIAGNOSIS — R39.11 BENIGN PROSTATIC HYPERPLASIA WITH URINARY HESITANCY: ICD-10-CM

## 2021-12-16 DIAGNOSIS — E78.5 DYSLIPIDEMIA: ICD-10-CM

## 2021-12-16 DIAGNOSIS — Z11.59 NEED FOR HEPATITIS C SCREENING TEST: ICD-10-CM

## 2021-12-16 LAB
ALBUMIN SERPL-MCNC: 4.4 G/DL (ref 3.5–5)
ALBUMIN UR-MCNC: NEGATIVE MG/DL
ALP SERPL-CCNC: 53 U/L (ref 45–120)
ALT SERPL W P-5'-P-CCNC: 18 U/L (ref 0–45)
ANION GAP SERPL CALCULATED.3IONS-SCNC: 10 MMOL/L (ref 5–18)
APPEARANCE UR: CLEAR
AST SERPL W P-5'-P-CCNC: 29 U/L (ref 0–40)
BILIRUB SERPL-MCNC: 1.3 MG/DL (ref 0–1)
BILIRUB UR QL STRIP: NEGATIVE
BUN SERPL-MCNC: 17 MG/DL (ref 8–28)
CALCIUM SERPL-MCNC: 9.2 MG/DL (ref 8.5–10.5)
CHLORIDE BLD-SCNC: 104 MMOL/L (ref 98–107)
CHOLEST SERPL-MCNC: 141 MG/DL
CO2 SERPL-SCNC: 26 MMOL/L (ref 22–31)
COLOR UR AUTO: YELLOW
CREAT SERPL-MCNC: 0.93 MG/DL (ref 0.7–1.3)
ERYTHROCYTE [DISTWIDTH] IN BLOOD BY AUTOMATED COUNT: 12.7 % (ref 10–15)
FASTING STATUS PATIENT QL REPORTED: YES
GFR SERPL CREATININE-BSD FRML MDRD: 81 ML/MIN/1.73M2
GLUCOSE BLD-MCNC: 87 MG/DL (ref 70–125)
GLUCOSE UR STRIP-MCNC: NEGATIVE MG/DL
HCT VFR BLD AUTO: 45.2 % (ref 40–53)
HDLC SERPL-MCNC: 46 MG/DL
HGB BLD-MCNC: 14.7 G/DL (ref 13.3–17.7)
HGB UR QL STRIP: NEGATIVE
KETONES UR STRIP-MCNC: NEGATIVE MG/DL
LDLC SERPL CALC-MCNC: 71 MG/DL
LEUKOCYTE ESTERASE UR QL STRIP: NEGATIVE
MCH RBC QN AUTO: 28.4 PG (ref 26.5–33)
MCHC RBC AUTO-ENTMCNC: 32.5 G/DL (ref 31.5–36.5)
MCV RBC AUTO: 87 FL (ref 78–100)
NITRATE UR QL: NEGATIVE
PH UR STRIP: 6 [PH] (ref 5–8)
PLATELET # BLD AUTO: 186 10E3/UL (ref 150–450)
POTASSIUM BLD-SCNC: 4.4 MMOL/L (ref 3.5–5)
PROT SERPL-MCNC: 7.3 G/DL (ref 6–8)
RBC # BLD AUTO: 5.18 10E6/UL (ref 4.4–5.9)
SODIUM SERPL-SCNC: 140 MMOL/L (ref 136–145)
SP GR UR STRIP: 1.02 (ref 1–1.03)
TRIGL SERPL-MCNC: 122 MG/DL
UROBILINOGEN UR STRIP-ACNC: 0.2 E.U./DL
WBC # BLD AUTO: 6.4 10E3/UL (ref 4–11)

## 2021-12-16 PROCEDURE — 80061 LIPID PANEL: CPT | Performed by: INTERNAL MEDICINE

## 2021-12-16 PROCEDURE — 90732 PPSV23 VACC 2 YRS+ SUBQ/IM: CPT | Performed by: INTERNAL MEDICINE

## 2021-12-16 PROCEDURE — 69210 REMOVE IMPACTED EAR WAX UNI: CPT | Performed by: INTERNAL MEDICINE

## 2021-12-16 PROCEDURE — 81003 URINALYSIS AUTO W/O SCOPE: CPT | Performed by: INTERNAL MEDICINE

## 2021-12-16 PROCEDURE — G0009 ADMIN PNEUMOCOCCAL VACCINE: HCPCS | Performed by: INTERNAL MEDICINE

## 2021-12-16 PROCEDURE — 86803 HEPATITIS C AB TEST: CPT | Performed by: INTERNAL MEDICINE

## 2021-12-16 PROCEDURE — 36415 COLL VENOUS BLD VENIPUNCTURE: CPT | Performed by: INTERNAL MEDICINE

## 2021-12-16 PROCEDURE — 80053 COMPREHEN METABOLIC PANEL: CPT | Performed by: INTERNAL MEDICINE

## 2021-12-16 PROCEDURE — 99214 OFFICE O/P EST MOD 30 MIN: CPT | Mod: 25 | Performed by: INTERNAL MEDICINE

## 2021-12-16 PROCEDURE — 99397 PER PM REEVAL EST PAT 65+ YR: CPT | Mod: 25 | Performed by: INTERNAL MEDICINE

## 2021-12-16 PROCEDURE — 85027 COMPLETE CBC AUTOMATED: CPT | Performed by: INTERNAL MEDICINE

## 2021-12-16 ASSESSMENT — ACTIVITIES OF DAILY LIVING (ADL): CURRENT_FUNCTION: NO ASSISTANCE NEEDED

## 2021-12-16 ASSESSMENT — MIFFLIN-ST. JEOR: SCORE: 1645.51

## 2021-12-16 NOTE — LETTER
December 20, 2021      Rich Musa  [address] [facility] [address]        Dear ,    Your labs look perfect Khadar.         Resulted Orders   Hepatitis C Screen Reflex to HCV RNA Quant and Genotype   Result Value Ref Range    Hepatitis C Antibody Nonreactive Nonreactive    Narrative    Assay performance characteristics have not been established for newborns, infants, and children.   Lipid panel reflex to direct LDL Fasting   Result Value Ref Range    Cholesterol 141 <=199 mg/dL    Triglycerides 122 <=149 mg/dL    Direct Measure HDL 46 >=40 mg/dL      Comment:      HDL Cholesterol Reference Range:     0-2 years:   No reference ranges established for patients under 2 years old  at [facility] for lipid analytes.    2-8 years:  Greater than 45 mg/dL     18 years and older:   Female: Greater than or equal to 50 mg/dL   Male:   Greater than or equal to 40 mg/dL    LDL Cholesterol Calculated 71 <=129 mg/dL    Patient Fasting > 8hrs? Yes    Comprehensive metabolic panel (BMP + Alb, Alk Phos, ALT, AST, Total. Bili, TP)   Result Value Ref Range    Sodium 140 136 - 145 mmol/L    Potassium 4.4 3.5 - 5.0 mmol/L    Chloride 104 98 - 107 mmol/L    Carbon Dioxide (CO2) 26 22 - 31 mmol/L    Anion Gap 10 5 - 18 mmol/L    Urea Nitrogen 17 8 - 28 mg/dL    Creatinine 0.93 0.70 - 1.30 mg/dL    Calcium 9.2 8.5 - 10.5 mg/dL    Glucose 87 70 - 125 mg/dL    Alkaline Phosphatase 53 45 - 120 U/L    AST 29 0 - 40 U/L    ALT 18 0 - 45 U/L    Protein Total 7.3 6.0 - 8.0 g/dL    Albumin 4.4 3.5 - 5.0 g/dL    Bilirubin Total 1.3 (H) 0.0 - 1.0 mg/dL    GFR Estimate 81 >60 mL/min/1.73m2      Comment:      As of July 11, 2021, eGFR is calculated by the CKD-EPI creatinine equation, without race adjustment. eGFR can be influenced by muscle mass, exercise, and diet. The reported eGFR is an estimation only and is only applicable if the renal function is stable.   UA Macro with Reflex to Micro and Culture - lab collect    Result Value Ref Range    Color Urine Yellow Colorless, Straw, Light Yellow, Yellow    Appearance Urine Clear Clear    Glucose Urine Negative Negative mg/dL    Bilirubin Urine Negative Negative    Ketones Urine Negative Negative mg/dL    Specific Gravity Urine 1.025 1.005 - 1.030    Blood Urine Negative Negative    pH Urine 6.0 5.0 - 8.0    Protein Albumin Urine Negative Negative mg/dL    Urobilinogen Urine 0.2 0.2, 1.0 E.U./dL    Nitrite Urine Negative Negative    Leukocyte Esterase Urine Negative Negative    Narrative    Microscopic not indicated   CBC with platelets   Result Value Ref Range    WBC Count 6.4 4.0 - 11.0 10e3/uL    RBC Count 5.18 4.40 - 5.90 10e6/uL    Hemoglobin 14.7 13.3 - 17.7 g/dL    Hematocrit 45.2 40.0 - 53.0 %    MCV 87 78 - 100 fL    MCH 28.4 26.5 - 33.0 pg    MCHC 32.5 31.5 - 36.5 g/dL    RDW 12.7 10.0 - 15.0 %    Platelet Count 186 150 - 450 10e3/uL       If you have any questions or concerns, please call the clinic at the number listed above.       Sincerely,      Antonio Hawley MD

## 2021-12-16 NOTE — PATIENT INSTRUCTIONS
Patient Education   Personalized Prevention Plan  You are due for the preventive services outlined below.  Your care team is available to assist you in scheduling these services.  If you have already completed any of these items, please share that information with your care team to update in your medical record.  Health Maintenance Due   Topic Date Due     ANNUAL REVIEW OF HM ORDERS  Never done     Hepatitis C Screening  Never done     Pneumococcal Vaccine (2 of 2 - PPSV23) 10/09/2019     FALL RISK ASSESSMENT  11/10/2021       Signs of Hearing Loss      Hearing much better with one ear can be a sign of hearing loss.   Hearing loss is a problem shared by many people. In fact, it is one of the most common health problems, particularly as people age. Most people age 65 and older have some hearing loss. By age 80, almost everyone does. Hearing loss often occurs slowly over the years. So you may not realize your hearing has gotten worse.  Have your hearing checked  Call your healthcare provider if you:    Have to strain to hear normal conversation    Have to watch other people s faces very carefully to follow what they re saying    Need to ask people to repeat what they ve said    Often misunderstand what people are saying    Turn the volume of the television or radio up so high that others complain    Feel that people are mumbling when they re talking to you    Find that the effort to hear leaves you feeling tired and irritated    Notice, when using the phone, that you hear better with one ear than the other  [employer] last reviewed this educational content on 1/1/2020 2000-2021 [employer]. All rights reserved. This information is not intended as a substitute for professional medical care. Always follow your healthcare professional's instructions.

## 2021-12-16 NOTE — PROGRESS NOTES
"SUBJECTIVE:   Rich Musa is a 76 year old male who presents for Preventive Visit.    Khadar comes in today for annual wellness.  Reports his been doing well.  He needs his ears cleaned out he believes.  He is going to be getting some hearing tested here pretty soon.  Otherwise no somatic concerns.  He wants to get a TURP.  They had to cancel it though because of Covid.  He is not having urinary retention issues at this time.  He has been in good spirits.  He has been careful about Covid.  He hopes to be able to go to [place] this spring left family.  Patient has been advised of split billing requirements and indicates understanding: Yes   Are you in the first 12 months of your Medicare coverage?  No    Healthy Habits:     In general, how would you rate your overall health?  Excellent    Frequency of exercise:  4-5 days/week    Duration of exercise:  30-45 minutes    Do you usually eat at least 4 servings of fruit and vegetables a day, include whole grains    & fiber and avoid regularly eating high fat or \"junk\" foods?  Yes    Taking medications regularly:  Yes    Medication side effects:  Not applicable    Ability to successfully perform activities of daily living:  No assistance needed    Home Safety:  No safety concerns identified    Hearing Impairment:  Difficulty following a conversation in a noisy restaurant or crowded room, feel that people are mumbling or not speaking clearly, difficulty following dialogue in the theater, difficult to understand a speaker at a public meeting or Sikh service, need to ask people to speak up or repeat themselves, find that men's voices are easier to understand than woman's and difficulty understanding soft or whispered speech    In the past 6 months, have you been bothered by leaking of urine?  No    In general, how would you rate your overall mental or emotional health?  Excellent      PHQ-2 Total Score: 0    Additional concerns today:  No    Do you feel safe in " your environment? Yes    Have you ever done Advance Care Planning? (For example, a Health Directive, POLST, or a discussion with a medical provider or your loved ones about your wishes): Yes, advance care planning is on file.       Fall risk  Fallen 2 or more times in the past year?: No  Any fall with injury in the past year?: No    Cognitive Screening   1) Repeat 3 items (Leader, Season, Table)    2) Clock draw: NORMAL  3) 3 item recall: Recalls 3 objects  Results: NORMAL clock  Mini-CogTM Copyright S Arabella. Licensed by the author for use in [facility]; reprinted with permission (brittney@[facility]). All rights reserved.      Do you have sleep apnea, excessive snoring or daytime drowsiness?: yes    Reviewed and updated as needed this visit by clinical staff  Tobacco  Allergies  Meds             Reviewed and updated as needed this visit by Provider               Social History     Tobacco Use     Smoking status: Never Smoker     Smokeless tobacco: Never Used   Substance Use Topics     Alcohol use: No     Comment: Alcoholic Drinks/day: social     If you drink alcohol do you typically have >3 drinks per day or >7 drinks per week? No    Alcohol Use 12/13/2021   Prescreen: >3 drinks/day or >7 drinks/week? No       Current providers sharing in care for this patient include:   Patient Care Team:  Antonio Hawley MD as PCP - General (Internal Medicine)  David Mathews DO as Assigned Sleep Provider  Kyaw Eisenberg MD as Assigned PCP  Black Carlos MD as MD    The following health maintenance items are reviewed in Epic and correct as of today:  Health Maintenance Due   Topic Date Due     ANNUAL REVIEW OF HM ORDERS  Never done     HEPATITIS C SCREENING  Never done     Pneumococcal Vaccine: 65+ Years (2 of 2 - PPSV23) 10/09/2019     MEDICARE ANNUAL WELLNESS VISIT  11/10/2021     FALL RISK ASSESSMENT  11/10/2021               Review of Systems  Constitutional, HEENT, cardiovascular, pulmonary, GI, ,  "musculoskeletal, neuro, skin, endocrine and psych systems are negative, except as otherwise noted.    OBJECTIVE:   /80 (BP Location: Left arm, Patient Position: Sitting, Cuff Size: Adult Small)   Pulse 68   Temp 98.2  F (36.8  C)   Ht 1.715 m (5' 7.5\")   Wt 93.9 kg (207 lb)   SpO2 98%   BMI 31.94 kg/m   Estimated body mass index is 31.94 kg/m  as calculated from the following:    Height as of this encounter: 1.715 m (5' 7.5\").    Weight as of this encounter: 93.9 kg (207 lb).  Physical Exam  GENERAL: healthy, alert and no distress  EYES: Eyes grossly normal to inspection, PERRL and conjunctivae and sclerae normal  NECK: no adenopathy, no asymmetry, masses, or scars and thyroid normal to palpation  RESP: lungs clear to auscultation - no rales, rhonchi or wheezes  CV: regular rate and rhythm, normal S1 S2, no S3 or S4, no murmur, click or rub, no peripheral edema and peripheral pulses strong  ABDOMEN: soft, nontender, no hepatosplenomegaly, no masses and bowel sounds normal   (male): normal male genitalia without lesions or urethral discharge, no hernia  MS: no gross musculoskeletal defects noted, no edema  SKIN: no suspicious lesions or rashes  NEURO: Normal strength and tone, mentation intact and speech normal  PSYCH: mentation appears normal, affect normal/bright        ASSESSMENT / PLAN:   1. Encounter for Medicare annual wellness exam  Patient is up-to-date on health maintenance.  There is some confusion on his pneumococcal vaccine.  It does not look like he has had a 23 valent booster so we will go ahead and provide that for him today.  Hepatitis C screening was discussed.    2. Need for hepatitis C screening test    - Hepatitis C Screen Reflex to HCV RNA Quant and Genotype; Future    3. GERD with stricture  No recurrent issues.  Continue current regimen.  - CBC with platelets; Future    4. BRYN on CPAP  He is compliant with his CPAP.  - CBC with platelets; Future    5. Dyslipidemia  Lipids today on " "his statin.  - Lipid panel reflex to direct LDL Fasting; Future  - Comprehensive metabolic panel (BMP + Alb, Alk Phos, ALT, AST, Total. Bili, TP); Future    6. Benign prostatic hyperplasia with urinary hesitancy  Awaiting TURP.  - UA Macro with Reflex to Micro and Culture - lab collect; Future    7. Bilateral impacted cerumen  Cerumen irrigation today per  - REMOVE IMPACTED CERUMEN    Patient has been advised of split billing requirements and indicates understanding: Yes  COUNSELING:  Reviewed preventive health counseling, as reflected in patient instructions    Estimated body mass index is 31.94 kg/m  as calculated from the following:    Height as of this encounter: 1.715 m (5' 7.5\").    Weight as of this encounter: 93.9 kg (207 lb).        He reports that he has never smoked. He has never used smokeless tobacco.      Appropriate preventive services were discussed with this patient, including applicable screening as appropriate for cardiovascular disease, diabetes, osteopenia/osteoporosis, and glaucoma.  As appropriate for age/gender, discussed screening for colorectal cancer, prostate cancer, breast cancer, and cervical cancer. Checklist reviewing preventive services available has been given to the patient.    Reviewed patients plan of care and provided an AVS. The Basic Care Plan (routine screening as documented in Health Maintenance) for Rich meets the Care Plan requirement. This Care Plan has been established and reviewed with the Patient.    Counseling Resources:  ATP IV Guidelines  Pooled Cohorts Equation Calculator  Breast Cancer Risk Calculator  Breast Cancer: Medication to Reduce Risk  FRAX Risk Assessment  ICSI Preventive Guidelines  Dietary Guidelines for Americans, 2010  USDA's MyPlate  ASA Prophylaxis  Lung CA Screening    JULIANE DEJESUS MD  [facility]    Identified Health Risks:    The patient was provided with written information regarding signs of hearing loss.  "

## 2021-12-17 LAB — HCV AB SERPL QL IA: NONREACTIVE

## 2022-01-04 ENCOUNTER — DOCUMENTATION ONLY (OUTPATIENT)
Dept: OTHER | Facility: CLINIC | Age: 77
End: 2022-01-04
Payer: COMMERCIAL

## 2022-01-30 DIAGNOSIS — E78.5 DYSLIPIDEMIA: Primary | ICD-10-CM

## 2022-02-01 RX ORDER — ATORVASTATIN CALCIUM 10 MG/1
10 TABLET, FILM COATED ORAL DAILY
Qty: 90 TABLET | Refills: 3 | Status: SHIPPED | OUTPATIENT
Start: 2022-02-01 | End: 2023-02-18

## 2022-02-01 NOTE — TELEPHONE ENCOUNTER
"Outpatient Medication Detail     Disp Refills Start End UMA   atorvastatin (LIPITOR) 10 MG tablet 90 tablet 3 2/2/2021  No   Sig - Route: Take 1 tablet (10 mg total) by mouth daily. - Oral   Sent to pharmacy as: atorvastatin 10 mg tablet (Lipitor)   E-Prescribing Status: Receipt confirmed by pharmacy (2/2/2021  6:27 PM CST)       Last office visit provider:  12/16/21     Requested Prescriptions   Pending Prescriptions Disp Refills     atorvastatin (LIPITOR) 10 MG tablet [Pharmacy Med Name: Atorvastatin Calcium Oral Tablet 10 MG] 90 tablet 0     Sig: TAKE ONE TABLET BY MOUTH ONE TIME DAILY       Statins Protocol Passed - 1/30/2022 11:06 AM        Passed - LDL on file in past 12 months     Recent Labs   Lab Test 12/16/21  1537   LDL 71             Passed - No abnormal creatine kinase in past 12 months     No lab results found.             Passed - Recent (12 mo) or future (30 days) visit within the authorizing provider's specialty     Patient has had an office visit with the authorizing provider or a provider within the authorizing providers department within the previous 12 mos or has a future within next 30 days. See \"Patient Info\" tab in inbasket, or \"Choose Columns\" in Meds & Orders section of the refill encounter.              Passed - Medication is active on med list        Passed - Patient is age 18 or older             Anthony Joyce RN 02/01/22 10:09 AM  "

## 2022-03-31 ENCOUNTER — TRANSFERRED RECORDS (OUTPATIENT)
Dept: HEALTH INFORMATION MANAGEMENT | Facility: CLINIC | Age: 77
End: 2022-03-31
Payer: COMMERCIAL

## 2022-04-07 ENCOUNTER — HOSPITAL ENCOUNTER (OUTPATIENT)
Dept: CT IMAGING | Facility: CLINIC | Age: 77
Discharge: HOME | End: 2022-04-07
Attending: UROLOGY | Admitting: UROLOGY
Payer: COMMERCIAL

## 2022-04-07 DIAGNOSIS — Z87.442 PERSONAL HISTORY OF URINARY CALCULI: ICD-10-CM

## 2022-04-07 PROCEDURE — 74176 CT ABD & PELVIS W/O CONTRAST: CPT

## 2022-04-11 ENCOUNTER — OFFICE VISIT (OUTPATIENT)
Dept: INTERNAL MEDICINE | Facility: CLINIC | Age: 77
End: 2022-04-11
Payer: COMMERCIAL

## 2022-04-11 VITALS
BODY MASS INDEX: 33.26 KG/M2 | SYSTOLIC BLOOD PRESSURE: 137 MMHG | DIASTOLIC BLOOD PRESSURE: 77 MMHG | WEIGHT: 211.9 LBS | RESPIRATION RATE: 16 BRPM | HEART RATE: 70 BPM | TEMPERATURE: 97.3 F | HEIGHT: 67 IN | OXYGEN SATURATION: 96 %

## 2022-04-11 DIAGNOSIS — R10.9 FLANK PAIN: Primary | ICD-10-CM

## 2022-04-11 DIAGNOSIS — M65.30 TRIGGER FINGER, ACQUIRED: ICD-10-CM

## 2022-04-11 DIAGNOSIS — G57.93 UNSPECIFIED MONONEUROPATHY OF BILATERAL LOWER LIMBS: ICD-10-CM

## 2022-04-11 DIAGNOSIS — M79.10 MYALGIA: ICD-10-CM

## 2022-04-11 DIAGNOSIS — G62.9 PERIPHERAL POLYNEUROPATHY: ICD-10-CM

## 2022-04-11 LAB
ALBUMIN SERPL-MCNC: 4.4 G/DL (ref 3.5–5)
ALBUMIN UR-MCNC: NEGATIVE MG/DL
ALP SERPL-CCNC: 53 U/L (ref 45–120)
ALT SERPL W P-5'-P-CCNC: 16 U/L (ref 0–45)
ANION GAP SERPL CALCULATED.3IONS-SCNC: 11 MMOL/L (ref 5–18)
APPEARANCE UR: CLEAR
AST SERPL W P-5'-P-CCNC: 29 U/L (ref 0–40)
BILIRUB SERPL-MCNC: 1 MG/DL (ref 0–1)
BILIRUB UR QL STRIP: NEGATIVE
BUN SERPL-MCNC: 17 MG/DL (ref 8–28)
CALCIUM SERPL-MCNC: 9.3 MG/DL (ref 8.5–10.5)
CHLORIDE BLD-SCNC: 106 MMOL/L (ref 98–107)
CK SERPL-CCNC: 181 U/L (ref 30–190)
CO2 SERPL-SCNC: 26 MMOL/L (ref 22–31)
COLOR UR AUTO: YELLOW
CREAT SERPL-MCNC: 0.81 MG/DL (ref 0.7–1.3)
ERYTHROCYTE [DISTWIDTH] IN BLOOD BY AUTOMATED COUNT: 13 % (ref 10–15)
GFR SERPL CREATININE-BSD FRML MDRD: >90 ML/MIN/1.73M2
GLUCOSE BLD-MCNC: 93 MG/DL (ref 70–125)
GLUCOSE UR STRIP-MCNC: NEGATIVE MG/DL
HCT VFR BLD AUTO: 45 % (ref 40–53)
HGB BLD-MCNC: 14.7 G/DL (ref 13.3–17.7)
HGB UR QL STRIP: NEGATIVE
KETONES UR STRIP-MCNC: NEGATIVE MG/DL
LEUKOCYTE ESTERASE UR QL STRIP: NEGATIVE
MCH RBC QN AUTO: 28.2 PG (ref 26.5–33)
MCHC RBC AUTO-ENTMCNC: 32.7 G/DL (ref 31.5–36.5)
MCV RBC AUTO: 86 FL (ref 78–100)
NITRATE UR QL: NEGATIVE
PH UR STRIP: 7 [PH] (ref 5–8)
PLATELET # BLD AUTO: 169 10E3/UL (ref 150–450)
POTASSIUM BLD-SCNC: 4.7 MMOL/L (ref 3.5–5)
PROT SERPL-MCNC: 7.3 G/DL (ref 6–8)
RBC # BLD AUTO: 5.22 10E6/UL (ref 4.4–5.9)
SODIUM SERPL-SCNC: 143 MMOL/L (ref 136–145)
SP GR UR STRIP: 1.02 (ref 1–1.03)
TSH SERPL DL<=0.005 MIU/L-ACNC: 3.24 UIU/ML (ref 0.3–5)
UROBILINOGEN UR STRIP-ACNC: 0.2 E.U./DL
VIT B12 SERPL-MCNC: 646 PG/ML (ref 213–816)
WBC # BLD AUTO: 5.8 10E3/UL (ref 4–11)

## 2022-04-11 PROCEDURE — 84443 ASSAY THYROID STIM HORMONE: CPT | Performed by: INTERNAL MEDICINE

## 2022-04-11 PROCEDURE — 85027 COMPLETE CBC AUTOMATED: CPT | Performed by: INTERNAL MEDICINE

## 2022-04-11 PROCEDURE — 80053 COMPREHEN METABOLIC PANEL: CPT | Performed by: INTERNAL MEDICINE

## 2022-04-11 PROCEDURE — 99214 OFFICE O/P EST MOD 30 MIN: CPT | Performed by: INTERNAL MEDICINE

## 2022-04-11 PROCEDURE — 82550 ASSAY OF CK (CPK): CPT | Performed by: INTERNAL MEDICINE

## 2022-04-11 PROCEDURE — 81003 URINALYSIS AUTO W/O SCOPE: CPT | Performed by: INTERNAL MEDICINE

## 2022-04-11 PROCEDURE — 36415 COLL VENOUS BLD VENIPUNCTURE: CPT | Performed by: INTERNAL MEDICINE

## 2022-04-11 PROCEDURE — U0005 INFEC AGEN DETEC AMPLI PROBE: HCPCS | Performed by: INTERNAL MEDICINE

## 2022-04-11 PROCEDURE — U0003 INFECTIOUS AGENT DETECTION BY NUCLEIC ACID (DNA OR RNA); SEVERE ACUTE RESPIRATORY SYNDROME CORONAVIRUS 2 (SARS-COV-2) (CORONAVIRUS DISEASE [COVID-19]), AMPLIFIED PROBE TECHNIQUE, MAKING USE OF HIGH THROUGHPUT TECHNOLOGIES AS DESCRIBED BY CMS-2020-01-R: HCPCS | Performed by: INTERNAL MEDICINE

## 2022-04-11 PROCEDURE — 82607 VITAMIN B-12: CPT | Performed by: INTERNAL MEDICINE

## 2022-04-11 NOTE — PROGRESS NOTES
Office Visit - Follow Up   Rich Musa   77 year old male    Date of Visit: 4/11/2022    Chief Complaint   Patient presents with     Back Pain     Rt/flank back side x 2 weeks (pt reports recent CT scan at [facility])         Assessment and Plan   1. Flank pain  Unclear etiology.  Will check lab work.  Scan negative for any underlying cause.  I have recommended letting me know if this persists or worsens or something changes.  - Comprehensive metabolic panel (BMP + Alb, Alk Phos, ALT, AST, Total. Bili, TP); Future  - UA Macro with Reflex to Micro and Culture - lab collect; Future  - CK total; Future  - CBC with platelets; Future  - Comprehensive metabolic panel (BMP + Alb, Alk Phos, ALT, AST, Total. Bili, TP)  - UA Macro with Reflex to Micro and Culture - lab collect  - CK total  - CBC with platelets    2. Myalgia  He will hold his statin for couple weeks and we will check him for Covid.  - Asymptomatic COVID-19 Virus (Coronavirus) by PCR Nose    3. Trigger finger, acquired  He has a trigger finger which is not going away.  He said a couple injections do not do anything.  He was told that surgery is needed and wonders my opinion.  I did concur that surgery is usually necessary if in steroid injections are not helpful.    4. Peripheral polyneuropathy  He notes numbness in his feet bilaterally.  His podiatrist told him he should have surgery for this.  He does not want to have surgery but it is not painful for him.  I think we can just monitor it.  We will check labs as below.  - TSH with free T4 reflex; Future  - Vitamin B12; Future  - TSH with free T4 reflex  - Vitamin B12    5. Unspecified mononeuropathy of bilateral lower limbs     - TSH with free T4 reflex; Future  - TSH with free T4 reflex        Return in about 8 months (around 12/11/2022) for Routine preventive, with me, in person.     History of Present Illness   This 77 year old Khadar comes in today for evaluation of some right flank pain.  In addition  "he has several other things he wants to bring up as well.  As result extensive visit today.  He has had a couple weeks of right flank pain.  He went to his urologist because he has a history of kidney stones and this was very reminiscent of that pain.  Starts in the right CVA region as then broke radiates around the side of the abdomen and into the groin.  No hematuria.  No nausea or vomiting.  He feels achy.  Wonders if he could have COVID actually.  He did have a CT scan which showed no evidence of kidney stone.  No other abnormality seen.  He had diverticulosis but no evidence of diverticulitis.  Pain comes and goes.  Movement does not seem to make a difference.  Food does not make a difference.  He is having normal BMs.  Normal urination.  No chest pains or shortness of breath.    Review of Systems: A comprehensive review of systems was negative except as noted.     Medications, Allergies and Problem List   Reviewed, reconciled and updated  Post Discharge Medication Reconciliation Status:   Social History     Social History Narrative     Not on file        Physical Exam   General Appearance:   Pleasant gentleman.  No rashes.  No CVA tenderness on palpation.  No spinal tenderness palpation.  Lungs clear.  Abdomen soft and tender.  No inguinal hernia noted.    /77 (BP Location: Left arm, Patient Position: Sitting, Cuff Size: Adult Large)   Pulse 70   Temp 97.3  F (36.3  C) (Axillary)   Resp 16   Ht 1.702 m (5' 7\")   Wt 96.1 kg (211 lb 14.4 oz)   SpO2 96%   BMI 33.19 kg/m           Additional Information   Current Outpatient Medications   Medication Sig Dispense Refill     atorvastatin (LIPITOR) 10 MG tablet Take 1 tablet (10 mg) by mouth daily 90 tablet 3     Calcium Citrate-Vitamin D (CALCIUM CITRATE +D PO)        Multiple Vitamins-Minerals (MULTIVITAMIN ADULT PO) Take 1 tablet by mouth       omeprazole (PRILOSEC) 20 MG DR capsule Take 1 capsule (20 mg total) by mouth daily before breakfast. 90 " capsule 1     tamsulosin (FLOMAX) 0.4 MG capsule Take 0.4 mg by mouth       Allergies   Allergen Reactions     Sulfa Drugs      Social History     Tobacco Use     Smoking status: Never Smoker     Smokeless tobacco: Never Used   Substance Use Topics     Alcohol use: No     Comment: Alcoholic Drinks/day: social     Drug use: No       Review and/or order of clinical lab tests:  Review and/or order of radiology tests:  Review and/or order of medicine tests:  Discussion of test results with performing physician:  Decision to obtain old records and/or obtain history from someone other than the patient:  Review and summarization of old records and/or obtaining history from someone other than the patient and.or discussion of case with another health care provider:  Independent visualization of image, tracing or specimen itself:    Time:      JULIANE DEJESUS MD

## 2022-04-11 NOTE — LETTER
April 14, 2022      Khadar Musa  [address] [facility] [address]        Dear ,    We are writing to inform you of your test results.    Khadar, your Covid test was negative.  Everything else here looks perfect and normal.   Let me know if your pain persists.       Resulted Orders   Asymptomatic COVID-19 Virus (Coronavirus) by PCR Nose   Result Value Ref Range    SARS CoV2 PCR Negative Negative, Testing sent to reference lab. Results will be returned via unsolicited result      Comment:      NEGATIVE: SARS-CoV-2 (COVID-19) RNA not detected, presumed negative.    Narrative    Testing was performed using the julius SARS-CoV-2 assay on the julius  [employer] System. This test should be ordered for the detection of  SARS-CoV-2 in individuals who meet SARS-CoV-2 clinical and/or  epidemiological criteria. Test performance is unknown in asymptomatic  patients. This test is for in vitro diagnostic use under the FDA EUA  for laboratories certified under CLIA to perform high and/or moderate  complexity testing. This test has not been FDA cleared or approved. A  negative result does not rule out the presence of PCR inhibitors in  the specimen or target RNA in concentration below the limit of  detection for the assay. The possibility of a false negative should  be considered if the patient's recent exposure or clinical  presentation suggests COVID-19. This test was validated by the [facility]. This  laboratory is certified under the Clinical Laboratory Improvement  Amendments of 1988 (CLIA-88) as qualified to perform high and/or  moderate complexity laboratory testing.   Comprehensive metabolic panel (BMP + Alb, Alk Phos, ALT, AST, Total. Bili, TP)   Result Value Ref Range    Sodium 143 136 - 145 mmol/L    Potassium 4.7 3.5 - 5.0 mmol/L    Chloride 106 98 - 107 mmol/L    Carbon Dioxide (CO2) 26 22 - 31 mmol/L    Anion Gap 11 5 - 18 mmol/L    Urea Nitrogen 17 8 - 28 mg/dL     Creatinine 0.81 0.70 - 1.30 mg/dL    Calcium 9.3 8.5 - 10.5 mg/dL    Glucose 93 70 - 125 mg/dL    Alkaline Phosphatase 53 45 - 120 U/L    AST 29 0 - 40 U/L    ALT 16 0 - 45 U/L    Protein Total 7.3 6.0 - 8.0 g/dL    Albumin 4.4 3.5 - 5.0 g/dL    Bilirubin Total 1.0 0.0 - 1.0 mg/dL    GFR Estimate >90 >60 mL/min/1.73m2      Comment:      Effective December 21, 2021 eGFRcr in adults is calculated using the 2021 CKD-EPI creatinine equation which includes age and gender (Damon et al., NEJ, DOI: 10.1056/LYIMyj6683072)   UA Macro with Reflex to Micro and Culture - lab collect   Result Value Ref Range    Color Urine Yellow Colorless, Straw, Light Yellow, Yellow    Appearance Urine Clear Clear    Glucose Urine Negative Negative mg/dL    Bilirubin Urine Negative Negative    Ketones Urine Negative Negative mg/dL    Specific Gravity Urine 1.020 1.005 - 1.030    Blood Urine Negative Negative    pH Urine 7.0 5.0 - 8.0    Protein Albumin Urine Negative Negative mg/dL    Urobilinogen Urine 0.2 0.2, 1.0 E.U./dL    Nitrite Urine Negative Negative    Leukocyte Esterase Urine Negative Negative    Narrative    Microscopic not indicated   CK total   Result Value Ref Range     30 - 190 U/L   CBC with platelets   Result Value Ref Range    WBC Count 5.8 4.0 - 11.0 10e3/uL    RBC Count 5.22 4.40 - 5.90 10e6/uL    Hemoglobin 14.7 13.3 - 17.7 g/dL    Hematocrit 45.0 40.0 - 53.0 %    MCV 86 78 - 100 fL    MCH 28.2 26.5 - 33.0 pg    MCHC 32.7 31.5 - 36.5 g/dL    RDW 13.0 10.0 - 15.0 %    Platelet Count 169 150 - 450 10e3/uL   TSH with free T4 reflex   Result Value Ref Range    TSH 3.24 0.30 - 5.00 uIU/mL   Vitamin B12   Result Value Ref Range    Vitamin B12 646 213 - 816 pg/mL       If you have any questions or concerns, please call the clinic at the number listed above.       Sincerely,      Antonio Hawley MD

## 2022-04-12 LAB — SARS-COV-2 RNA RESP QL NAA+PROBE: NEGATIVE

## 2022-04-19 ENCOUNTER — IMMUNIZATION (OUTPATIENT)
Dept: NURSING | Facility: CLINIC | Age: 77
End: 2022-04-19
Payer: COMMERCIAL

## 2022-04-19 PROCEDURE — 0054A COVID-19,PF,PFIZER (12+ YRS): CPT

## 2022-04-19 PROCEDURE — 91305 COVID-19,PF,PFIZER (12+ YRS): CPT

## 2022-04-21 ENCOUNTER — OFFICE VISIT (OUTPATIENT)
Dept: INTERNAL MEDICINE | Facility: CLINIC | Age: 77
End: 2022-04-21
Payer: COMMERCIAL

## 2022-04-21 VITALS
HEART RATE: 70 BPM | DIASTOLIC BLOOD PRESSURE: 80 MMHG | HEIGHT: 67 IN | WEIGHT: 211.6 LBS | BODY MASS INDEX: 33.21 KG/M2 | OXYGEN SATURATION: 95 % | SYSTOLIC BLOOD PRESSURE: 136 MMHG | RESPIRATION RATE: 18 BRPM

## 2022-04-21 DIAGNOSIS — K22.2 GERD WITH STRICTURE: ICD-10-CM

## 2022-04-21 DIAGNOSIS — K21.9 GERD WITH STRICTURE: ICD-10-CM

## 2022-04-21 DIAGNOSIS — R10.9 FLANK PAIN: Primary | ICD-10-CM

## 2022-04-21 PROCEDURE — 99213 OFFICE O/P EST LOW 20 MIN: CPT | Performed by: INTERNAL MEDICINE

## 2022-04-21 RX ORDER — NAPROXEN 500 MG/1
500 TABLET ORAL 2 TIMES DAILY WITH MEALS
Qty: 60 TABLET | Refills: 0 | Status: SHIPPED | OUTPATIENT
Start: 2022-04-21 | End: 2022-06-06

## 2022-04-21 NOTE — PROGRESS NOTES
"  Office Visit - Follow Up   Rich Musa   77 year old male    Date of Visit: 4/21/2022    Chief Complaint   Patient presents with     Back Pain     On going - persistant        Assessment and Plan   1. Flank pain  Odd situation.  It does seem musculoskeletal.  Treat with naproxen for a week.  He will let me know how he is doing next week.  I have asked that he increase his omeprazole to twice a day while he is on the naproxen.  - naproxen (NAPROSYN) 500 MG tablet; Take 1 tablet (500 mg) by mouth 2 times daily (with meals)  Dispense: 60 tablet; Refill: 0    2. GERD with stricture  As above.  - omeprazole (PRILOSEC) 20 MG DR capsule; Take 1 capsule (20 mg) by mouth 2 times daily as needed  Dispense: 180 capsule; Refill: 1        Return in about 1 week (around 4/28/2022) for follow up with tiffany.     History of Present Illness   This 77 year old Khadar comes in for follow-up of his flank pain.  He continues to have this right-sided flank pain.  He had CT scan which was negative for stone.  He feels it is deep.  Initially he thought maybe food made a difference but now he does not think so.  Seems to get worse as the day goes on when he moves around.  It is constant.  He had very similar symptoms, actually the exact same symptoms back in 2020 at which time we put him on naproxen and his pain resolved.  He would like to try that again.  He is leaving for [place] soon    Review of Systems: A comprehensive review of systems was negative except as noted.     Medications, Allergies and Problem List   Reviewed, reconciled and updated  Post Discharge Medication Reconciliation Status:   Social History     Social History Narrative     Not on file        Physical Exam   General Appearance:   Pleasant gentleman.  No spinal or paraspinal tenderness.  No abdominal tenderness.  No rashes    /80 (BP Location: Left arm, Patient Position: Sitting, Cuff Size: Adult Large)   Pulse 70   Resp 18   Ht 1.702 m (5' 7\")   Wt 96 kg " (211 lb 9.6 oz)   SpO2 95%   BMI 33.14 kg/m           Additional Information   Current Outpatient Medications   Medication Sig Dispense Refill     atorvastatin (LIPITOR) 10 MG tablet Take 1 tablet (10 mg) by mouth daily 90 tablet 3     Calcium Citrate-Vitamin D (CALCIUM CITRATE +D PO)        Multiple Vitamins-Minerals (MULTIVITAMIN ADULT PO) Take 1 tablet by mouth       naproxen (NAPROSYN) 500 MG tablet Take 1 tablet (500 mg) by mouth 2 times daily (with meals) 60 tablet 0     omeprazole (PRILOSEC) 20 MG DR capsule Take 1 capsule (20 mg) by mouth 2 times daily as needed 180 capsule 1     tamsulosin (FLOMAX) 0.4 MG capsule Take 0.4 mg by mouth       Allergies   Allergen Reactions     Sulfa Drugs      Social History     Tobacco Use     Smoking status: Never Smoker     Smokeless tobacco: Never Used   Substance Use Topics     Alcohol use: No     Comment: Alcoholic Drinks/day: social     Drug use: No       Review and/or order of clinical lab tests:  Review and/or order of radiology tests:  Review and/or order of medicine tests:  Discussion of test results with performing physician:  Decision to obtain old records and/or obtain history from someone other than the patient:  Review and summarization of old records and/or obtaining history from someone other than the patient and.or discussion of case with another health care provider:  Independent visualization of image, tracing or specimen itself:    Time:      JULIANE DEJESUS MD

## 2022-06-06 ENCOUNTER — OFFICE VISIT (OUTPATIENT)
Dept: INTERNAL MEDICINE | Facility: CLINIC | Age: 77
End: 2022-06-06
Payer: COMMERCIAL

## 2022-06-06 VITALS
BODY MASS INDEX: 33.22 KG/M2 | SYSTOLIC BLOOD PRESSURE: 130 MMHG | DIASTOLIC BLOOD PRESSURE: 85 MMHG | HEART RATE: 71 BPM | OXYGEN SATURATION: 96 % | WEIGHT: 212.1 LBS

## 2022-06-06 DIAGNOSIS — R10.9 RIGHT FLANK PAIN: Primary | ICD-10-CM

## 2022-06-06 DIAGNOSIS — N20.0 CALCULUS OF KIDNEY: ICD-10-CM

## 2022-06-06 DIAGNOSIS — K59.00 CONSTIPATION, UNSPECIFIED CONSTIPATION TYPE: ICD-10-CM

## 2022-06-06 LAB
ALBUMIN UR-MCNC: NEGATIVE MG/DL
APPEARANCE UR: CLEAR
BILIRUB UR QL STRIP: NEGATIVE
COLOR UR AUTO: YELLOW
GLUCOSE UR STRIP-MCNC: NEGATIVE MG/DL
HGB UR QL STRIP: NEGATIVE
KETONES UR STRIP-MCNC: NEGATIVE MG/DL
LEUKOCYTE ESTERASE UR QL STRIP: NEGATIVE
NITRATE UR QL: NEGATIVE
PH UR STRIP: 7 [PH] (ref 5–8)
SP GR UR STRIP: 1.02 (ref 1–1.03)
UROBILINOGEN UR STRIP-ACNC: 0.2 E.U./DL

## 2022-06-06 PROCEDURE — 99214 OFFICE O/P EST MOD 30 MIN: CPT | Performed by: INTERNAL MEDICINE

## 2022-06-06 PROCEDURE — 81003 URINALYSIS AUTO W/O SCOPE: CPT | Performed by: INTERNAL MEDICINE

## 2022-06-06 NOTE — LETTER
June 7, 2022      Khadar PORRAS Lencho  [address] [facility] [place] [address]        Dear ,    We are writing to inform you of your test results.    Urine is clear.         Resulted Orders   UA Macro with Reflex to Micro and Culture - lab collect   Result Value Ref Range    Color Urine Yellow Colorless, Straw, Light Yellow, Yellow    Appearance Urine Clear Clear    Glucose Urine Negative Negative mg/dL    Bilirubin Urine Negative Negative    Ketones Urine Negative Negative mg/dL    Specific Gravity Urine 1.020 1.005 - 1.030    Blood Urine Negative Negative    pH Urine 7.0 5.0 - 8.0    Protein Albumin Urine Negative Negative mg/dL    Urobilinogen Urine 0.2 0.2, 1.0 E.U./dL    Nitrite Urine Negative Negative    Leukocyte Esterase Urine Negative Negative    Narrative    Microscopic not indicated       If you have any questions or concerns, please call the clinic at the number listed above.       Sincerely,      Antonio Hawley MD

## 2022-06-06 NOTE — PROGRESS NOTES
Office Visit - Follow Up   Rich Musa   77 year old male    Date of Visit: 6/6/2022    Chief Complaint   Patient presents with     RECHECK     Back Pain feels the same; patient is no longer on Celebrex        Assessment and Plan   1. Right flank pain  Unclear etiology.  CT scan negative.  Labs all normal.  We will recheck urinalysis.  I do not really want to scan him again.  We will see what spine thinks about his pain as at this point I am thinking it is musculoskeletal.  - Spine Referral; Future  - UA Macro with Reflex to Micro and Culture - lab collect; Future  - UA Macro with Reflex to Micro and Culture - lab collect    2. Constipation, unspecified constipation type  He does tell me that sometimes if he has a good BM his pains improved but his pain definitely does not have anything to do with the bowels it sounds like.    3. Nephrolithiasis  No other symptoms of nephrolithiasis at this time.        Return in about 1 week (around 6/13/2022) for Next scheduled visit.     History of Present Illness   This 77 year old Khadar comes in the follow-up of his flank pain.  He has had this right-sided flank pain now for several months.  He thought it was very reminiscent of stone pain that he has had in the past he went to his urologist initially.  CT scan showed only tiny stones and no other cause of his pain was found.  He has taken naproxen which does dull the pain or improves it but then when he stops to comes back.  Certain movements may make it worse.  With peaks constipated may make it worse.  I put him on stomach medicines to see if that helped and it made no difference.  I had him hold his statin and that made no difference.  He is concerned because its not going away.  There is no new components to it.  He tells me it is feeling better at this moment    Review of Systems: A comprehensive review of systems was negative except as noted.     Medications, Allergies and Problem List   Reviewed, reconciled and  updated  Post Discharge Medication Reconciliation Status:   Social History     Social History Narrative     Not on file        Physical Exam   General Appearance:   No rashes or palpable abnormalities.  His points to the paraspinal muscle in the low thoracic region.    /85 (BP Location: Left arm, Patient Position: Sitting, Cuff Size: Adult Large)   Pulse 71   Wt 96.2 kg (212 lb 1.6 oz)   SpO2 96%   BMI 33.22 kg/m           Additional Information   Current Outpatient Medications   Medication Sig Dispense Refill     atorvastatin (LIPITOR) 10 MG tablet Take 1 tablet (10 mg) by mouth daily 90 tablet 3     Calcium Citrate-Vitamin D (CALCIUM CITRATE +D PO)        Multiple Vitamins-Minerals (MULTIVITAMIN ADULT PO) Take 1 tablet by mouth       omeprazole (PRILOSEC) 20 MG DR capsule Take 20 mg by mouth daily       tamsulosin (FLOMAX) 0.4 MG capsule Take 0.4 mg by mouth       Allergies   Allergen Reactions     Sulfa Drugs      Social History     Tobacco Use     Smoking status: Never Smoker     Smokeless tobacco: Never Used   Substance Use Topics     Alcohol use: No     Comment: Alcoholic Drinks/day: social     Drug use: No       Review and/or order of clinical lab tests:  Review and/or order of radiology tests:  Review and/or order of medicine tests:  Discussion of test results with performing physician:  Decision to obtain old records and/or obtain history from someone other than the patient:  Review and summarization of old records and/or obtaining history from someone other than the patient and.or discussion of case with another health care provider:  Independent visualization of image, tracing or specimen itself:    Time:      JULIANE DEJESUS MD

## 2022-06-29 NOTE — PROGRESS NOTES
ASSESSMENT: Rich Musa is a 77 year old male with past medical history significant for BRYN, GERD, dyslipidemia, nephrolithiasis, BPH who presents today for new patient evaluation of right low back pain with intermittent radiation into the right flank.  Patient has not had dedicated imaging of his lumbar spine.  A CT abdomen and pelvis from April 2022 was negative for obstructing stone.  It showed showed grade 2 spondylolisthesis L5 on S1 due to L5 pars defects.  There is suggestion of high-grade foraminal stenosis bilaterally at this level but there is a large bridging osteophyte so the segment should be stable.  He does not have any radicular symptoms on the legs.  Suspect axial back pain is related to lumbar facet arthropathy and he has myofascial pain/musclar strain causing pain radiating toward the flank. Pain has actually improved significantly today.  He is neurologically intact.    PLAN:  A shared decision making model was used.  The patient's values and choices were respected.  The following represents what was discussed and decided upon by the physician assistant and the patient.      1.  DIAGNOSTIC TESTS: I reviewed the CT abdomen pelvis.  I offered to obtain an MRI lumbar spine for further evaluation.  He declined since pain is improving.  If pain flares back up again, I would recommend an MRI lumbar spine as a neck step.    2.  PHYSICAL THERAPY:  Discussed the importance of core strengthening, ROM, stretching exercises with the patient and how each of these entities is important in decreasing pain.  Explained to the patient that the purpose of physical therapy is to teach the patient a home exercise program.  These exercises need to be performed every day in order to decrease pain and prevent future occurrences of pain.  I entered a referral to physical therapy.    3.  MEDICATIONS: No changes are made to the patient's medications.  He has been using Tylenol, ibuprofen, and naproxen as needed.   Ibuprofen helps the most.  He does not feel like he needs anything stronger since pain has improved.  - If pain flares back up, I would add a muscle relaxer (tizanidine 2 to 4 mg 3 times daily as needed).    4.  INTERVENTIONS: No interventions were ordered.    5.  PATIENT EDUCATION: Patient is in agreement the above plan.  All questions were answered.    6.  FOLLOW-UP:   Patient is going to follow-up with me as needed.  Pain is significantly improved today.  If he has questions or concerns, he should not hesitate to call.      SUBJECTIVE:  Rich Musa  Is a 77 year old male who presents today in consultation at the request of Dr. Hawley for new patient evaluation of low back pain.  Patient reports that he has had back problems since he was in elementary school.  He was hit in the back while playing football.  He states no evaluation was done.  When he got into high school he was a 3 sport athlete and he noticed increased pain when he was doing leg lifts, but nothing was done about it.  His freshman year in college she was continuing to have pain so he went to his primary doctor.  They found a spondylolisthesis.  He was given an injection which helped tremendously with his pain at that time.  Patient reports that since then his back has not bothered him much, although he is very careful with his back and he focuses on strengthening exercises to keep his back and core strong.  Several months ago he began to experience a dull pain in his right back.  He was seen at urology because he did have a history of kidney stones.  A CT scan was negative for stones.  About 6 weeks ago pain became more severe and began to radiate into the right flank with no injury.  Just 2 days ago after sitting in a lawn chair on a hill while watching a parade the pain became severe.  He states that yesterday was a very bad day.  He had to rest and take it easy.  Any movement of his back could cause severe sharp pain, as if someone hit him in  the back with a bat or lightning was striking his back.  Today pain has improved significantly.    Patient complains of right-sided low back pain.  Pain is located in the lower lumbar region.  When pain was severe it radiated along the top of the right iliac crest toward the right lower abdomen.  Denies any pain down the legs.  Denies any numbness, tingling, weakness down the legs.  Yesterday pain was aggravated with twisting, turning over, lying on the floor.  Pain is alleviated with stretching, especially rolling back and forth on the floor.  He denies loss of bowel or bladder control.  Denies recent fevers, chills, sweats.    Patient has not had any formal physical therapy although he does exercises for his core regularly.  He does not go to a chiropractor.  He has not had any injections since he was in college.  He has not had spine surgery.  He uses Tylenol, ibuprofen, and naproxen as needed for pain.  Ibuprofen works the best.  Yesterday when he took ibuprofen the back pain essentially resolved.  He had only mild residual right flank pain.    Current Outpatient Medications   Medication     atorvastatin (LIPITOR) 10 MG tablet     Calcium Citrate-Vitamin D (CALCIUM CITRATE +D PO)     Multiple Vitamins-Minerals (MULTIVITAMIN ADULT PO)     omeprazole (PRILOSEC) 20 MG DR capsule     tamsulosin (FLOMAX) 0.4 MG capsule       Allergies   Allergen Reactions     Sulfa Drugs        Past Medical History:   Diagnosis Date     BPH (benign prostatic hyperplasia)      BRYN (obstructive sleep apnea)         Patient Active Problem List   Diagnosis     Dyslipidemia     GERD with stricture     BRYN on CPAP     Seborrheic Dermatitis     Seborrheic Keratosis     Nephrolithiasis     Trochanteric Bursitis     Benign prostatic hyperplasia with urinary hesitancy     Plantar fasciitis       Past Surgical History:   Procedure Laterality Date      KNEE SCOPE, DIAGNOSTIC      Description: Arthroscopy Knee Right;  Recorded: 12/19/2011;        Family History   Problem Relation Age of Onset     Diabetes Mother      Breast Cancer Mother      Diabetes Father      Kidney Disease Father          age 52 nephropathy     Diabetes Sister      Cancer Brother         bladder       Social history: Patient is .  He is a retired educator.  He drinks alcohol occasionally.  Denies tobacco use.  Denies illicit drug use.      ROS: Positive for ringing in ears, reflux, difficulty urinating (BPH).  Specifically negative for bowel/bladder dysfunction, fevers,chills, appetite changes, unexplained weight loss.   Otherwise 13 systems reviewed are negative.  Please see the patient's intake questionnaire from today for details.      OBJECTIVE:  PHYSICAL EXAMINATION:    CONSTITUTIONAL:  Vital signs as above.  No acute distress.  The patient is well nourished and well groomed.  PSYCHIATRIC:  The patient is awake, alert, oriented to person, place, time and answering questions appropriately with clear speech.    HEENT: Normocephalic, atraumatic.  Sclera clear.  Neck is supple.  SKIN:  Skin over the face, bilateral lower extremities, and posterior torso is clean, dry, intact without rashes.    GAIT:  Gait is non-antalgic.  The patient is able to heel and toe walk without significant difficulty.    STANDING EXAMINATION: Patient has a step-off deformity lumbar spine.  No significant tenderness palpation lumbar spinous processes.  No tenderness palpation bilateral lower lumbar paraspinous muscles.  Lumbar flexion intact.  Lumbar extension mildly restricted with increased low back pain.  MUSCLE STRENGTH:  The patient has 5/5 strength for the bilateral hip flexors, knee flexors/extensors, ankle dorsiflexors/plantar flexors, great toe extensors, ankle evertors/invertors.  NEUROLOGICAL: 1-2+ patellar, and achilles reflexes bilaterally.  Negative Babinski's bilaterally.  No ankle clonus bilaterally. Sensation to light touch is intact in the bilateral L4, L5, and S1  dermatomes.  VASCULAR:  2/4 dorsalis pedis pulses bilaterally.  Bilateral lower extremities are warm.  There is no pitting edema of the bilateral lower extremities.  ABDOMINAL:  Soft, non-distended, non-tender throughout all quadrants.  No pulsatile mass palpated in the left lower quadrant.  LYMPH NODES:  No palpable or tender inguinal lymph nodes.  MUSCULOSKELETAL: Straight leg raise is negative bilaterally.    RESULTS: I reviewed a CT abdomen pelvis from [facility] dated April 7, 2022.  Patient's chronic bilateral pars defects of L5 with grade 2 spondylolisthesis L5 on S1.  There is suggestion of high-grade foraminal stenosis bilaterally at this level.  There is lumbarization of the S1 vertebral body.

## 2022-06-30 ENCOUNTER — OFFICE VISIT (OUTPATIENT)
Dept: PHYSICAL MEDICINE AND REHAB | Facility: CLINIC | Age: 77
End: 2022-06-30
Payer: COMMERCIAL

## 2022-06-30 VITALS
SYSTOLIC BLOOD PRESSURE: 140 MMHG | HEIGHT: 67 IN | BODY MASS INDEX: 32.54 KG/M2 | WEIGHT: 207.3 LBS | HEART RATE: 65 BPM | DIASTOLIC BLOOD PRESSURE: 81 MMHG

## 2022-06-30 DIAGNOSIS — R10.9 RIGHT FLANK PAIN: ICD-10-CM

## 2022-06-30 DIAGNOSIS — M43.16 SPONDYLOLISTHESIS OF LUMBAR REGION: Primary | ICD-10-CM

## 2022-06-30 DIAGNOSIS — M79.18 MYOFASCIAL PAIN: ICD-10-CM

## 2022-06-30 PROCEDURE — 99204 OFFICE O/P NEW MOD 45 MIN: CPT | Performed by: PHYSICIAN ASSISTANT

## 2022-06-30 ASSESSMENT — PAIN SCALES - GENERAL: PAINLEVEL: SEVERE PAIN (7)

## 2022-06-30 NOTE — PATIENT INSTRUCTIONS
An order for physicaltherapy has been provided today.  Someone will call you to schedule physical therapy or you can call [phone] to schedule physical therapy.  It will be very important for you to do your physical therapy exercises on aregular basis to decrease your pain and prevent future flares of pain.

## 2022-06-30 NOTE — LETTER
6/30/2022         RE: Rich Musa  [address] [facility] [address]        Dear Colleague,    Thank you for referring your patient, Rich Musa, to the [facility]. Please see a copy of my visit note below.    ASSESSMENT: Rich Musa is a 77 year old male with past medical history significant for BYRN, GERD, dyslipidemia, nephrolithiasis, BPH who presents today for new patient evaluation of right low back pain with intermittent radiation into the right flank.  Patient has not had dedicated imaging of his lumbar spine.  A CT abdomen and pelvis from April 2022 was negative for obstructing stone.  It showed showed grade 2 spondylolisthesis L5 on S1 due to L5 pars defects.  There is suggestion of high-grade foraminal stenosis bilaterally at this level but there is a large bridging osteophyte so the segment should be stable.  He does not have any radicular symptoms on the legs.  Suspect axial back pain is related to lumbar facet arthropathy and he has myofascial pain/musclar strain causing pain radiating toward the flank. Pain has actually improved significantly today.  He is neurologically intact.    PLAN:  A shared decision making model was used.  The patient's values and choices were respected.  The following represents what was discussed and decided upon by the physician assistant and the patient.      1.  DIAGNOSTIC TESTS: I reviewed the CT abdomen pelvis.  I offered to obtain an MRI lumbar spine for further evaluation.  He declined since pain is improving.  If pain flares back up again, I would recommend an MRI lumbar spine as a neck step.    2.  PHYSICAL THERAPY:  Discussed the importance of core strengthening, ROM, stretching exercises with the patient and how each of these entities is important in decreasing pain.  Explained to the patient that the purpose of physical therapy is to teach the patient a home exercise program.  These exercises need to be  performed every day in order to decrease pain and prevent future occurrences of pain.  I entered a referral to physical therapy.    3.  MEDICATIONS: No changes are made to the patient's medications.  He has been using Tylenol, ibuprofen, and naproxen as needed.  Ibuprofen helps the most.  He does not feel like he needs anything stronger since pain has improved.  - If pain flares back up, I would add a muscle relaxer (tizanidine 2 to 4 mg 3 times daily as needed).    4.  INTERVENTIONS: No interventions were ordered.    5.  PATIENT EDUCATION: Patient is in agreement the above plan.  All questions were answered.    6.  FOLLOW-UP:   Patient is going to follow-up with me as needed.  Pain is significantly improved today.  If he has questions or concerns, he should not hesitate to call.      SUBJECTIVE:  Rich Musa  Is a 77 year old male who presents today in consultation at the request of Dr. Hawley for new patient evaluation of low back pain.  Patient reports that he has had back problems since he was in elementary school.  He was hit in the back while playing football.  He states no evaluation was done.  When he got into high school he was a 3 sport athlete and he noticed increased pain when he was doing leg lifts, but nothing was done about it.  His freshman year in college she was continuing to have pain so he went to his primary doctor.  They found a spondylolisthesis.  He was given an injection which helped tremendously with his pain at that time.  Patient reports that since then his back has not bothered him much, although he is very careful with his back and he focuses on strengthening exercises to keep his back and core strong.  Several months ago he began to experience a dull pain in his right back.  He was seen at urology because he did have a history of kidney stones.  A CT scan was negative for stones.  About 6 weeks ago pain became more severe and began to radiate into the right flank with no injury.   Just 2 days ago after sitting in a lawn chair on a hill while watching a parade the pain became severe.  He states that yesterday was a very bad day.  He had to rest and take it easy.  Any movement of his back could cause severe sharp pain, as if someone hit him in the back with a bat or lightning was striking his back.  Today pain has improved significantly.    Patient complains of right-sided low back pain.  Pain is located in the lower lumbar region.  When pain was severe it radiated along the top of the right iliac crest toward the right lower abdomen.  Denies any pain down the legs.  Denies any numbness, tingling, weakness down the legs.  Yesterday pain was aggravated with twisting, turning over, lying on the floor.  Pain is alleviated with stretching, especially rolling back and forth on the floor.  He denies loss of bowel or bladder control.  Denies recent fevers, chills, sweats.    Patient has not had any formal physical therapy although he does exercises for his core regularly.  He does not go to a chiropractor.  He has not had any injections since he was in college.  He has not had spine surgery.  He uses Tylenol, ibuprofen, and naproxen as needed for pain.  Ibuprofen works the best.  Yesterday when he took ibuprofen the back pain essentially resolved.  He had only mild residual right flank pain.    Current Outpatient Medications   Medication     atorvastatin (LIPITOR) 10 MG tablet     Calcium Citrate-Vitamin D (CALCIUM CITRATE +D PO)     Multiple Vitamins-Minerals (MULTIVITAMIN ADULT PO)     omeprazole (PRILOSEC) 20 MG DR capsule     tamsulosin (FLOMAX) 0.4 MG capsule       Allergies   Allergen Reactions     Sulfa Drugs        Past Medical History:   Diagnosis Date     BPH (benign prostatic hyperplasia)      BRYN (obstructive sleep apnea)         Patient Active Problem List   Diagnosis     Dyslipidemia     GERD with stricture     BRYN on CPAP     Seborrheic Dermatitis     Seborrheic Keratosis      Nephrolithiasis     Trochanteric Bursitis     Benign prostatic hyperplasia with urinary hesitancy     Plantar fasciitis       Past Surgical History:   Procedure Laterality Date     HC KNEE SCOPE, DIAGNOSTIC      Description: Arthroscopy Knee Right;  Recorded: 2011;       Family History   Problem Relation Age of Onset     Diabetes Mother      Breast Cancer Mother      Diabetes Father      Kidney Disease Father          age 52 nephropathy     Diabetes Sister      Cancer Brother         bladder       Social history: Patient is .  He is a retired educator.  He drinks alcohol occasionally.  Denies tobacco use.  Denies illicit drug use.      ROS: Positive for ringing in ears, reflux, difficulty urinating (BPH).  Specifically negative for bowel/bladder dysfunction, fevers,chills, appetite changes, unexplained weight loss.   Otherwise 13 systems reviewed are negative.  Please see the patient's intake questionnaire from today for details.      OBJECTIVE:  PHYSICAL EXAMINATION:    CONSTITUTIONAL:  Vital signs as above.  No acute distress.  The patient is well nourished and well groomed.  PSYCHIATRIC:  The patient is awake, alert, oriented to person, place, time and answering questions appropriately with clear speech.    HEENT: Normocephalic, atraumatic.  Sclera clear.  Neck is supple.  SKIN:  Skin over the face, bilateral lower extremities, and posterior torso is clean, dry, intact without rashes.    GAIT:  Gait is non-antalgic.  The patient is able to heel and toe walk without significant difficulty.    STANDING EXAMINATION: Patient has a step-off deformity lumbar spine.  No significant tenderness palpation lumbar spinous processes.  No tenderness palpation bilateral lower lumbar paraspinous muscles.  Lumbar flexion intact.  Lumbar extension mildly restricted with increased low back pain.  MUSCLE STRENGTH:  The patient has 5/5 strength for the bilateral hip flexors, knee flexors/extensors, ankle  dorsiflexors/plantar flexors, great toe extensors, ankle evertors/invertors.  NEUROLOGICAL: 1-2+ patellar, and achilles reflexes bilaterally.  Negative Babinski's bilaterally.  No ankle clonus bilaterally. Sensation to light touch is intact in the bilateral L4, L5, and S1 dermatomes.  VASCULAR:  2/4 dorsalis pedis pulses bilaterally.  Bilateral lower extremities are warm.  There is no pitting edema of the bilateral lower extremities.  ABDOMINAL:  Soft, non-distended, non-tender throughout all quadrants.  No pulsatile mass palpated in the left lower quadrant.  LYMPH NODES:  No palpable or tender inguinal lymph nodes.  MUSCULOSKELETAL: Straight leg raise is negative bilaterally.    RESULTS: I reviewed a CT abdomen pelvis from [facility] dated April 7, 2022.  Patient's chronic bilateral pars defects of L5 with grade 2 spondylolisthesis L5 on S1.  There is suggestion of high-grade foraminal stenosis bilaterally at this level.  There is lumbarization of the S1 vertebral body.        Again, thank you for allowing me to participate in the care of your patient.        Sincerely,        Paulina Carbajal PA-C

## 2022-10-06 ENCOUNTER — MYC MEDICAL ADVICE (OUTPATIENT)
Dept: INTERNAL MEDICINE | Facility: CLINIC | Age: 77
End: 2022-10-06

## 2022-10-11 ENCOUNTER — TRANSFERRED RECORDS (OUTPATIENT)
Dept: HEALTH INFORMATION MANAGEMENT | Facility: CLINIC | Age: 77
End: 2022-10-11

## 2022-11-01 ENCOUNTER — OFFICE VISIT (OUTPATIENT)
Dept: INTERNAL MEDICINE | Facility: CLINIC | Age: 77
End: 2022-11-01
Payer: COMMERCIAL

## 2022-11-01 VITALS
SYSTOLIC BLOOD PRESSURE: 118 MMHG | TEMPERATURE: 97.9 F | BODY MASS INDEX: 32.65 KG/M2 | HEIGHT: 67 IN | OXYGEN SATURATION: 98 % | DIASTOLIC BLOOD PRESSURE: 68 MMHG | WEIGHT: 208 LBS | HEART RATE: 64 BPM

## 2022-11-01 DIAGNOSIS — R39.11 BENIGN PROSTATIC HYPERPLASIA WITH URINARY HESITANCY: ICD-10-CM

## 2022-11-01 DIAGNOSIS — G47.33 OSA ON CPAP: ICD-10-CM

## 2022-11-01 DIAGNOSIS — H61.21 IMPACTED CERUMEN OF RIGHT EAR: ICD-10-CM

## 2022-11-01 DIAGNOSIS — Z01.818 PRE-OP EXAM: Primary | ICD-10-CM

## 2022-11-01 DIAGNOSIS — N40.1 BENIGN PROSTATIC HYPERPLASIA WITH URINARY HESITANCY: ICD-10-CM

## 2022-11-01 LAB
ANION GAP SERPL CALCULATED.3IONS-SCNC: 11 MMOL/L (ref 7–15)
ATRIAL RATE - MUSE: 62 BPM
BUN SERPL-MCNC: 20 MG/DL (ref 8–23)
CALCIUM SERPL-MCNC: 9.3 MG/DL (ref 8.8–10.2)
CHLORIDE SERPL-SCNC: 107 MMOL/L (ref 98–107)
CREAT SERPL-MCNC: 0.94 MG/DL (ref 0.67–1.17)
DEPRECATED HCO3 PLAS-SCNC: 24 MMOL/L (ref 22–29)
DIASTOLIC BLOOD PRESSURE - MUSE: NORMAL MMHG
GFR SERPL CREATININE-BSD FRML MDRD: 85 ML/MIN/1.73M2
GLUCOSE SERPL-MCNC: 97 MG/DL (ref 70–99)
HGB BLD-MCNC: 14.4 G/DL (ref 13.3–17.7)
INTERPRETATION ECG - MUSE: NORMAL
P AXIS - MUSE: 60 DEGREES
POTASSIUM SERPL-SCNC: 4 MMOL/L (ref 3.4–5.3)
PR INTERVAL - MUSE: 202 MS
QRS DURATION - MUSE: 94 MS
QT - MUSE: 386 MS
QTC - MUSE: 391 MS
R AXIS - MUSE: 22 DEGREES
SODIUM SERPL-SCNC: 142 MMOL/L (ref 136–145)
SYSTOLIC BLOOD PRESSURE - MUSE: NORMAL MMHG
T AXIS - MUSE: 42 DEGREES
VENTRICULAR RATE- MUSE: 62 BPM

## 2022-11-01 PROCEDURE — 36415 COLL VENOUS BLD VENIPUNCTURE: CPT | Performed by: INTERNAL MEDICINE

## 2022-11-01 PROCEDURE — 85018 HEMOGLOBIN: CPT | Performed by: INTERNAL MEDICINE

## 2022-11-01 PROCEDURE — 80048 BASIC METABOLIC PNL TOTAL CA: CPT | Performed by: INTERNAL MEDICINE

## 2022-11-01 PROCEDURE — 99214 OFFICE O/P EST MOD 30 MIN: CPT | Mod: 25 | Performed by: INTERNAL MEDICINE

## 2022-11-01 PROCEDURE — 93005 ELECTROCARDIOGRAM TRACING: CPT | Performed by: INTERNAL MEDICINE

## 2022-11-01 NOTE — H&P (VIEW-ONLY)
[facility]  [address] [place] [address]  Phone: [phone]  Fax: [phone]  Primary Provider: Juliane Hawley  Pre-op Performing Provider: JULIANE HAWLEY      PREOPERATIVE EVALUATION:  Today's date: 11/1/2022    Rich Musa is a 77 year old male who presents for a preoperative evaluation.    Surgical Information:  Surgery/Procedure: CYSTOSCOPY, BIPOLAR TRANSURETHRAL RESECTION OF PROSTATE  Surgery Location: [facility]   Surgeon: Dr. Shaan Grewal   Surgery Date: 11/15/22  Time of Surgery: Mid morning   Where patient plans to recover: At home with family  Fax number for surgical facility: Note does not need to be faxed, will be available electronically in Epic.    Type of Anesthesia Anticipated: to be determined    1. Pre-op exam  Proceed with surgery as planned.  He was given perioperative med management instructions.  - EKG 12-lead, tracing only  - Basic metabolic panel  (Ca, Cl, CO2, Creat, Gluc, K, Na, BUN); Future  - Hemoglobin; Future    2. Benign prostatic hyperplasia with urinary hesitancy  As above.  We discussed what to expect with this procedure.    3. BRYN on CPAP  He is to take his CPAP with him to the procedure    4. Impacted cerumen of right ear  Will irrigate his ear today to remove cerumen.  - REMOVE IMPACTED CERUMEN    Subjective     HPI related to upcoming procedure: Khadar is a pleasant gentleman who is quite healthy 77-year-old with some sleep apnea on CPAP as well as longstanding BPH.  He is having difficulty with his BPH to the point where he is going to be undergoing TURP here in the next few weeks.  He has been putting it off and the surgeries have been postponed due to COVID.  He is anxious to get this taken care of.  Aside from the BPH symptoms he is feeling quite well.    Preop Questions 10/30/2022   1. Have you ever had a heart attack or stroke? No   2. Have you ever had surgery on your heart or blood vessels,  such as a stent placement, a coronary artery bypass, or surgery on an artery in your head, neck, heart, or legs? No   3. Do you have chest pain with activity? No   4. Do you have a history of  heart failure? No   5. Do you currently have a cold, bronchitis or symptoms of other infection? No   6. Do you have a cough, shortness of breath, or wheezing? No   7. Do you or anyone in your family have previous history of blood clots? No   8. Do you or does anyone in your family have a serious bleeding problem such as prolonged bleeding following surgeries or cuts? No   9. Have you ever had problems with anemia or been told to take iron pills? No   10. Have you had any abnormal blood loss such as black, tarry or bloody stools? No   11. Have you ever had a blood transfusion? No   12. Are you willing to have a blood transfusion if it is medically needed before, during, or after your surgery? Yes   13. Have you or any of your relatives ever had problems with anesthesia? No   14. Do you have sleep apnea, excessive snoring or daytime drowsiness? YES - use CPAP machine   14a. Do you have a CPAP machine? Yes   15. Do you have any artifical heart valves or other implanted medical devices like a pacemaker, defibrillator, or continuous glucose monitor? No   16. Do you have artificial joints? No   17. Are you allergic to latex? No       Health Care Directive:  Patient has a Health Care Directive on file      Preoperative Review of :   reviewed - no record of controlled substances prescribed.      Status of Chronic Conditions:  See problem list for active medical problems.  Problems all longstanding and stable, except as noted/documented.  See ROS for pertinent symptoms related to these conditions.      Review of Systems  Constitutional, neuro, ENT, endocrine, pulmonary, cardiac, gastrointestinal, genitourinary, musculoskeletal, integument and psychiatric systems are negative, except as otherwise noted.    Patient Active Problem  List    Diagnosis Date Noted     Plantar fasciitis 11/10/2020     Priority: Medium     Benign prostatic hyperplasia with urinary hesitancy 12/10/2019     Priority: Medium     Dyslipidemia      Priority: Medium     Created by Conversion         GERD with stricture      Priority: Medium     Created by Conversion         BRYN on CPAP      Priority: Medium     Created by Conversion  Replacement Utility updated for latest IMO load         Seborrheic Dermatitis      Priority: Medium     Created by Conversion         Seborrheic Keratosis      Priority: Medium     Created by Conversion         Nephrolithiasis      Priority: Medium     Created by Conversion  [facility] Annotation: Sep  9 2009 10:11AM - Neeta Ryan: 7/08   removed         Trochanteric Bursitis      Priority: Medium     Created by Conversion          Past Medical History:   Diagnosis Date     BPH (benign prostatic hyperplasia)      BRYN (obstructive sleep apnea)      Past Surgical History:   Procedure Laterality Date     HC KNEE SCOPE, DIAGNOSTIC      Description: Arthroscopy Knee Right;  Recorded: 12/19/2011;     Current Outpatient Medications   Medication Sig Dispense Refill     atorvastatin (LIPITOR) 10 MG tablet Take 1 tablet (10 mg) by mouth daily 90 tablet 3     Calcium Citrate-Vitamin D (CALCIUM CITRATE +D PO) Take 1 tablet by mouth 2 times daily       Multiple Vitamins-Minerals (MULTIVITAMIN ADULT PO) Take 1 tablet by mouth daily       omeprazole (PRILOSEC) 20 MG DR capsule Take 20 mg by mouth daily       tamsulosin (FLOMAX) 0.4 MG capsule Take 0.4 mg by mouth At Bedtime         Allergies   Allergen Reactions     Sulfa Drugs         Social History     Tobacco Use     Smoking status: Never     Smokeless tobacco: Never   Substance Use Topics     Alcohol use: No     Comment: Alcoholic Drinks/day: social     Family History   Problem Relation Age of Onset     Diabetes Mother      Breast Cancer Mother      Diabetes Father      Kidney Disease Father      "     age 52 nephropathy     Diabetes Sister      Cancer Brother         bladder     History   Drug Use No         Objective     /68 (BP Location: Left arm, Patient Position: Sitting, Cuff Size: Adult Regular)   Pulse 64   Temp 97.9  F (36.6  C)   Ht 1.702 m (5' 7\")   Wt 94.3 kg (208 lb)   SpO2 98%   BMI 32.58 kg/m      Physical Exam    GENERAL APPEARANCE: healthy, alert and no distress     EYES: EOMI,  PERRL     HENT: External ears normal.  Left auditory canal and tympanic membrane normal.  Right auditory canal cerumen impacted     NECK: no adenopathy, no asymmetry, masses, or scars and thyroid normal to palpation     RESP: lungs clear to auscultation - no rales, rhonchi or wheezes     CV: regular rates and rhythm, normal S1 S2, no S3 or S4 and no murmur, click or rub     ABDOMEN:  soft, nontender, no HSM or masses and bowel sounds normal     MS: extremities normal- no gross deformities noted, no evidence of inflammation in joints, FROM in all extremities.     SKIN: no suspicious lesions or rashes     NEURO: Normal strength and tone, sensory exam grossly normal, mentation intact and speech normal     PSYCH: mentation appears normal. and affect normal/bright     LYMPHATICS: No cervical adenopathy    Recent Labs   Lab Test 22  1210 21  1537   HGB 14.7 14.7    186    140   POTASSIUM 4.7 4.4   CR 0.81 0.93        Diagnostics:  Labs pending at this time.  Results will be reviewed when available.   EKG: appears normal, NSR, normal axis, normal intervals, no acute ST/T changes c/w ischemia, no LVH by voltage criteria, unchanged from previous tracings    Revised Cardiac Risk Index (RCRI):  The patient has the following serious cardiovascular risks for perioperative complications:   - No serious cardiac risks = 0 points     RCRI Interpretation: 0 points: Class I (very low risk - 0.4% complication rate)           Signed Electronically by: JULIANE DEJESUS MD  Copy of this evaluation " report is provided to requesting physician.

## 2022-11-01 NOTE — PROGRESS NOTES
[facility]  [address] [place] [address]  Phone: [phone]  Fax: [phone]  Primary Provider: Juliane Hawley  Pre-op Performing Provider: JULIANE HAWLEY      PREOPERATIVE EVALUATION:  Today's date: 11/1/2022    Rich Musa is a 77 year old male who presents for a preoperative evaluation.    Surgical Information:  Surgery/Procedure: CYSTOSCOPY, BIPOLAR TRANSURETHRAL RESECTION OF PROSTATE  Surgery Location: [facility]   Surgeon: Dr. Shaan Grewal   Surgery Date: 11/15/22  Time of Surgery: Mid morning   Where patient plans to recover: At home with family  Fax number for surgical facility: Note does not need to be faxed, will be available electronically in Epic.    Type of Anesthesia Anticipated: to be determined    1. Pre-op exam  Proceed with surgery as planned.  He was given perioperative med management instructions.  - EKG 12-lead, tracing only  - Basic metabolic panel  (Ca, Cl, CO2, Creat, Gluc, K, Na, BUN); Future  - Hemoglobin; Future    2. Benign prostatic hyperplasia with urinary hesitancy  As above.  We discussed what to expect with this procedure.    3. BRYN on CPAP  He is to take his CPAP with him to the procedure    4. Impacted cerumen of right ear  Will irrigate his ear today to remove cerumen.  - REMOVE IMPACTED CERUMEN    Subjective     HPI related to upcoming procedure: Khadar is a pleasant gentleman who is quite healthy 77-year-old with some sleep apnea on CPAP as well as longstanding BPH.  He is having difficulty with his BPH to the point where he is going to be undergoing TURP here in the next few weeks.  He has been putting it off and the surgeries have been postponed due to COVID.  He is anxious to get this taken care of.  Aside from the BPH symptoms he is feeling quite well.    Preop Questions 10/30/2022   1. Have you ever had a heart attack or stroke? No   2. Have you ever had surgery on your heart or blood vessels,  such as a stent placement, a coronary artery bypass, or surgery on an artery in your head, neck, heart, or legs? No   3. Do you have chest pain with activity? No   4. Do you have a history of  heart failure? No   5. Do you currently have a cold, bronchitis or symptoms of other infection? No   6. Do you have a cough, shortness of breath, or wheezing? No   7. Do you or anyone in your family have previous history of blood clots? No   8. Do you or does anyone in your family have a serious bleeding problem such as prolonged bleeding following surgeries or cuts? No   9. Have you ever had problems with anemia or been told to take iron pills? No   10. Have you had any abnormal blood loss such as black, tarry or bloody stools? No   11. Have you ever had a blood transfusion? No   12. Are you willing to have a blood transfusion if it is medically needed before, during, or after your surgery? Yes   13. Have you or any of your relatives ever had problems with anesthesia? No   14. Do you have sleep apnea, excessive snoring or daytime drowsiness? YES - use CPAP machine   14a. Do you have a CPAP machine? Yes   15. Do you have any artifical heart valves or other implanted medical devices like a pacemaker, defibrillator, or continuous glucose monitor? No   16. Do you have artificial joints? No   17. Are you allergic to latex? No       Health Care Directive:  Patient has a Health Care Directive on file      Preoperative Review of :   reviewed - no record of controlled substances prescribed.      Status of Chronic Conditions:  See problem list for active medical problems.  Problems all longstanding and stable, except as noted/documented.  See ROS for pertinent symptoms related to these conditions.      Review of Systems  Constitutional, neuro, ENT, endocrine, pulmonary, cardiac, gastrointestinal, genitourinary, musculoskeletal, integument and psychiatric systems are negative, except as otherwise noted.    Patient Active Problem  List    Diagnosis Date Noted     Plantar fasciitis 11/10/2020     Priority: Medium     Benign prostatic hyperplasia with urinary hesitancy 12/10/2019     Priority: Medium     Dyslipidemia      Priority: Medium     Created by Conversion         GERD with stricture      Priority: Medium     Created by Conversion         BRYN on CPAP      Priority: Medium     Created by Conversion  Replacement Utility updated for latest IMO load         Seborrheic Dermatitis      Priority: Medium     Created by Conversion         Seborrheic Keratosis      Priority: Medium     Created by Conversion         Nephrolithiasis      Priority: Medium     Created by Conversion  [facility] Annotation: Sep  9 2009 10:11AM - Neeta Ryan: 7/08   removed         Trochanteric Bursitis      Priority: Medium     Created by Conversion          Past Medical History:   Diagnosis Date     BPH (benign prostatic hyperplasia)      BRYN (obstructive sleep apnea)      Past Surgical History:   Procedure Laterality Date     HC KNEE SCOPE, DIAGNOSTIC      Description: Arthroscopy Knee Right;  Recorded: 12/19/2011;     Current Outpatient Medications   Medication Sig Dispense Refill     atorvastatin (LIPITOR) 10 MG tablet Take 1 tablet (10 mg) by mouth daily 90 tablet 3     Calcium Citrate-Vitamin D (CALCIUM CITRATE +D PO) Take 1 tablet by mouth 2 times daily       Multiple Vitamins-Minerals (MULTIVITAMIN ADULT PO) Take 1 tablet by mouth daily       omeprazole (PRILOSEC) 20 MG DR capsule Take 20 mg by mouth daily       tamsulosin (FLOMAX) 0.4 MG capsule Take 0.4 mg by mouth At Bedtime         Allergies   Allergen Reactions     Sulfa Drugs         Social History     Tobacco Use     Smoking status: Never     Smokeless tobacco: Never   Substance Use Topics     Alcohol use: No     Comment: Alcoholic Drinks/day: social     Family History   Problem Relation Age of Onset     Diabetes Mother      Breast Cancer Mother      Diabetes Father      Kidney Disease Father      "     age 52 nephropathy     Diabetes Sister      Cancer Brother         bladder     History   Drug Use No         Objective     /68 (BP Location: Left arm, Patient Position: Sitting, Cuff Size: Adult Regular)   Pulse 64   Temp 97.9  F (36.6  C)   Ht 1.702 m (5' 7\")   Wt 94.3 kg (208 lb)   SpO2 98%   BMI 32.58 kg/m      Physical Exam    GENERAL APPEARANCE: healthy, alert and no distress     EYES: EOMI,  PERRL     HENT: External ears normal.  Left auditory canal and tympanic membrane normal.  Right auditory canal cerumen impacted     NECK: no adenopathy, no asymmetry, masses, or scars and thyroid normal to palpation     RESP: lungs clear to auscultation - no rales, rhonchi or wheezes     CV: regular rates and rhythm, normal S1 S2, no S3 or S4 and no murmur, click or rub     ABDOMEN:  soft, nontender, no HSM or masses and bowel sounds normal     MS: extremities normal- no gross deformities noted, no evidence of inflammation in joints, FROM in all extremities.     SKIN: no suspicious lesions or rashes     NEURO: Normal strength and tone, sensory exam grossly normal, mentation intact and speech normal     PSYCH: mentation appears normal. and affect normal/bright     LYMPHATICS: No cervical adenopathy    Recent Labs   Lab Test 22  1210 21  1537   HGB 14.7 14.7    186    140   POTASSIUM 4.7 4.4   CR 0.81 0.93        Diagnostics:  Labs pending at this time.  Results will be reviewed when available.   EKG: appears normal, NSR, normal axis, normal intervals, no acute ST/T changes c/w ischemia, no LVH by voltage criteria, unchanged from previous tracings    Revised Cardiac Risk Index (RCRI):  The patient has the following serious cardiovascular risks for perioperative complications:   - No serious cardiac risks = 0 points     RCRI Interpretation: 0 points: Class I (very low risk - 0.4% complication rate)           Signed Electronically by: JULIANE DEJESUS MD  Copy of this evaluation " report is provided to requesting physician.

## 2022-11-01 NOTE — H&P (VIEW-ONLY)
[facility]  [address] [place] [address]  Phone: [phone]  Fax: [phone]  Primary Provider: Juliane Hawley  Pre-op Performing Provider: JULIANE HAWLEY      PREOPERATIVE EVALUATION:  Today's date: 11/1/2022    Rich Musa is a 77 year old male who presents for a preoperative evaluation.    Surgical Information:  Surgery/Procedure: CYSTOSCOPY, BIPOLAR TRANSURETHRAL RESECTION OF PROSTATE  Surgery Location: [facility]   Surgeon: Dr. Shaan Grewal   Surgery Date: 11/15/22  Time of Surgery: Mid morning   Where patient plans to recover: At home with family  Fax number for surgical facility: Note does not need to be faxed, will be available electronically in Epic.    Type of Anesthesia Anticipated: to be determined    1. Pre-op exam  Proceed with surgery as planned.  He was given perioperative med management instructions.  - EKG 12-lead, tracing only  - Basic metabolic panel  (Ca, Cl, CO2, Creat, Gluc, K, Na, BUN); Future  - Hemoglobin; Future    2. Benign prostatic hyperplasia with urinary hesitancy  As above.  We discussed what to expect with this procedure.    3. BRYN on CPAP  He is to take his CPAP with him to the procedure    4. Impacted cerumen of right ear  Will irrigate his ear today to remove cerumen.  - REMOVE IMPACTED CERUMEN    Subjective     HPI related to upcoming procedure: Khadar is a pleasant gentleman who is quite healthy 77-year-old with some sleep apnea on CPAP as well as longstanding BPH.  He is having difficulty with his BPH to the point where he is going to be undergoing TURP here in the next few weeks.  He has been putting it off and the surgeries have been postponed due to COVID.  He is anxious to get this taken care of.  Aside from the BPH symptoms he is feeling quite well.    Preop Questions 10/30/2022   1. Have you ever had a heart attack or stroke? No   2. Have you ever had surgery on your heart or blood vessels,  such as a stent placement, a coronary artery bypass, or surgery on an artery in your head, neck, heart, or legs? No   3. Do you have chest pain with activity? No   4. Do you have a history of  heart failure? No   5. Do you currently have a cold, bronchitis or symptoms of other infection? No   6. Do you have a cough, shortness of breath, or wheezing? No   7. Do you or anyone in your family have previous history of blood clots? No   8. Do you or does anyone in your family have a serious bleeding problem such as prolonged bleeding following surgeries or cuts? No   9. Have you ever had problems with anemia or been told to take iron pills? No   10. Have you had any abnormal blood loss such as black, tarry or bloody stools? No   11. Have you ever had a blood transfusion? No   12. Are you willing to have a blood transfusion if it is medically needed before, during, or after your surgery? Yes   13. Have you or any of your relatives ever had problems with anesthesia? No   14. Do you have sleep apnea, excessive snoring or daytime drowsiness? YES - use CPAP machine   14a. Do you have a CPAP machine? Yes   15. Do you have any artifical heart valves or other implanted medical devices like a pacemaker, defibrillator, or continuous glucose monitor? No   16. Do you have artificial joints? No   17. Are you allergic to latex? No       Health Care Directive:  Patient has a Health Care Directive on file      Preoperative Review of :   reviewed - no record of controlled substances prescribed.      Status of Chronic Conditions:  See problem list for active medical problems.  Problems all longstanding and stable, except as noted/documented.  See ROS for pertinent symptoms related to these conditions.      Review of Systems  Constitutional, neuro, ENT, endocrine, pulmonary, cardiac, gastrointestinal, genitourinary, musculoskeletal, integument and psychiatric systems are negative, except as otherwise noted.    Patient Active Problem  List    Diagnosis Date Noted     Plantar fasciitis 11/10/2020     Priority: Medium     Benign prostatic hyperplasia with urinary hesitancy 12/10/2019     Priority: Medium     Dyslipidemia      Priority: Medium     Created by Conversion         GERD with stricture      Priority: Medium     Created by Conversion         BRYN on CPAP      Priority: Medium     Created by Conversion  Replacement Utility updated for latest IMO load         Seborrheic Dermatitis      Priority: Medium     Created by Conversion         Seborrheic Keratosis      Priority: Medium     Created by Conversion         Nephrolithiasis      Priority: Medium     Created by Conversion  [facility] Annotation: Sep  9 2009 10:11AM - Neeta Ryan: 7/08   removed         Trochanteric Bursitis      Priority: Medium     Created by Conversion          Past Medical History:   Diagnosis Date     BPH (benign prostatic hyperplasia)      BRYN (obstructive sleep apnea)      Past Surgical History:   Procedure Laterality Date     HC KNEE SCOPE, DIAGNOSTIC      Description: Arthroscopy Knee Right;  Recorded: 12/19/2011;     Current Outpatient Medications   Medication Sig Dispense Refill     atorvastatin (LIPITOR) 10 MG tablet Take 1 tablet (10 mg) by mouth daily 90 tablet 3     Calcium Citrate-Vitamin D (CALCIUM CITRATE +D PO) Take 1 tablet by mouth 2 times daily       Multiple Vitamins-Minerals (MULTIVITAMIN ADULT PO) Take 1 tablet by mouth daily       omeprazole (PRILOSEC) 20 MG DR capsule Take 20 mg by mouth daily       tamsulosin (FLOMAX) 0.4 MG capsule Take 0.4 mg by mouth At Bedtime         Allergies   Allergen Reactions     Sulfa Drugs         Social History     Tobacco Use     Smoking status: Never     Smokeless tobacco: Never   Substance Use Topics     Alcohol use: No     Comment: Alcoholic Drinks/day: social     Family History   Problem Relation Age of Onset     Diabetes Mother      Breast Cancer Mother      Diabetes Father      Kidney Disease Father      "     age 52 nephropathy     Diabetes Sister      Cancer Brother         bladder     History   Drug Use No         Objective     /68 (BP Location: Left arm, Patient Position: Sitting, Cuff Size: Adult Regular)   Pulse 64   Temp 97.9  F (36.6  C)   Ht 1.702 m (5' 7\")   Wt 94.3 kg (208 lb)   SpO2 98%   BMI 32.58 kg/m      Physical Exam    GENERAL APPEARANCE: healthy, alert and no distress     EYES: EOMI,  PERRL     HENT: External ears normal.  Left auditory canal and tympanic membrane normal.  Right auditory canal cerumen impacted     NECK: no adenopathy, no asymmetry, masses, or scars and thyroid normal to palpation     RESP: lungs clear to auscultation - no rales, rhonchi or wheezes     CV: regular rates and rhythm, normal S1 S2, no S3 or S4 and no murmur, click or rub     ABDOMEN:  soft, nontender, no HSM or masses and bowel sounds normal     MS: extremities normal- no gross deformities noted, no evidence of inflammation in joints, FROM in all extremities.     SKIN: no suspicious lesions or rashes     NEURO: Normal strength and tone, sensory exam grossly normal, mentation intact and speech normal     PSYCH: mentation appears normal. and affect normal/bright     LYMPHATICS: No cervical adenopathy    Recent Labs   Lab Test 22  1210 21  1537   HGB 14.7 14.7    186    140   POTASSIUM 4.7 4.4   CR 0.81 0.93        Diagnostics:  Labs pending at this time.  Results will be reviewed when available.   EKG: appears normal, NSR, normal axis, normal intervals, no acute ST/T changes c/w ischemia, no LVH by voltage criteria, unchanged from previous tracings    Revised Cardiac Risk Index (RCRI):  The patient has the following serious cardiovascular risks for perioperative complications:   - No serious cardiac risks = 0 points     RCRI Interpretation: 0 points: Class I (very low risk - 0.4% complication rate)           Signed Electronically by: JULIANE DEJESUS MD  Copy of this evaluation " report is provided to requesting physician.

## 2022-11-01 NOTE — PATIENT INSTRUCTIONS
Preparing for Your Surgery  Getting started  A nurse will call you to review your health history and instructions. They will give you an arrival time based on your scheduled surgery time. Please be ready to share:    Your doctor s clinic name and phone number    Your medical, surgical, and anesthesia history    A list of allergies and sensitivities    A list of medicines, including herbal treatments and over-the-counter drugs    Whether the patient has a legal guardian (ask how to send us the papers in advance)  Please tell us if you re pregnant--or if there s any chance you might be pregnant. Some surgeries may injure a fetus (unborn baby), so they require a pregnancy test. Surgeries that are safe for a fetus don t always need a test, and you can choose whether to have one.   If you have a child who s having surgery, please ask for a copy of Preparing for Your Child s Surgery.    Preparing for surgery    Within 10 to 30 days of surgery: Have a pre-op exam (sometimes called an H&P, or History and Physical). This can be done at a clinic or pre-operative center.  ? If you re having a , you may not need this exam. Talk to your care team.    At your pre-op exam, talk to your care team about all medicines you take. If you need to stop any medicines before surgery, ask when to start taking them again.  ? We do this for your safety. Many medicines can make you bleed too much during surgery. Some change how well surgery (anesthesia) drugs work.    Call your insurance company to let them know you re having surgery. (If you don t have insurance, call [phone].)    Call your clinic if there s any change in your health. This includes signs of a cold or flu (sore throat, runny nose, cough, rash, fever). It also includes a scrape or scratch near the surgery site.    If you have questions on the day of surgery, call your hospital or surgery center.  COVID testing  You may need to be tested for COVID-19 before having  surgery. If so, we will give you instructions (or click here).  Eating and drinking guidelines  For your safety: Unless your surgeon tells you otherwise, follow the guidelines below.    Eat and drink as usual until 8 hours before you arrive for surgery. After that, no food or milk.    Drink clear liquids until 2 hours before you arrive. These are liquids you can see through, like water, Gatorade, and Propel Water. They also include plain black coffee and tea (no cream or milk), candy, and breath mints. You can spit out gum when you arrive.    If you drink alcohol: Stop drinking it the night before surgery.    If your care team tells you to take medicine on the morning of surgery, it s okay to take it with a sip of water.  Preventing infection    Shower or bathe the night before and morning of your surgery. Follow the instructions your clinic gave you. (If no instructions, use regular soap.)    Don t shave or clip hair near your surgery site. We ll remove the hair if needed.    Don t smoke or vape the morning of surgery. You may chew nicotine gum up to 2 hours before surgery. A nicotine patch is okay.  ? Note: Some surgeries require you to completely quit smoking and nicotine. Check with your surgeon.    Your care team will make every effort to keep you safe from infection. We will:  ? Clean our hands often with soap and water (or an alcohol-based hand rub).  ? Clean the skin at your surgery site with a special soap that kills germs.  ? Give you a special gown to keep you warm. (Cold raises the risk of infection.)  ? Wear special hair covers, masks, gowns and gloves during surgery.  ? Give antibiotic medicine, if prescribed. Not all surgeries need antibiotics.  What to bring on the day of surgery    Photo ID and insurance card    Copy of your health care directive, if you have one    Glasses and hearing aids (bring cases)  ? You can t wear contacts during surgery    Inhaler and eye drops, if you use them (tell us  about these when you arrive)    CPAP machine or breathing device, if you use them    A few personal items, if spending the night    If you have . . .  ? A pacemaker, ICD (cardiac defibrillator) or other implant: Bring the ID card.  ? An implanted stimulator: Bring the remote control.  ? A legal guardian: Bring a copy of the certified (court-stamped) guardianship papers.  Please remove any jewelry, including body piercings. Leave jewelry and other valuables at home.  If you re going home the day of surgery    You must have a responsible adult drive you home. They should stay with you overnight as well.    If you don t have someone to stay with you, and you aren t safe to go home alone, we may keep you overnight. Insurance often won t pay for this.  After surgery  If it s hard to control your pain or you need more pain medicine, please call your surgeon s office.  Questions?   If you have any questions for your care team, list them here:   ____________________________________________________________________________________________________________________________________________________________________________________________________________________________________________________________________  For informational purposes only. Not to replace the advice of your health care provider. Copyright   2003, 2019 [employer]. All rights reserved. Clinically reviewed by Nakita Tillman MD. [employer] [id number] - REV 10/22.    How to Take Your Medication Before Surgery      Do not take aspirin or any other over-the-counter anti-inflammatories such as Advil or Aleve for 1 week prior to procedure.    Nothing to eat or drink after midnight the night prior to procedure.    Morning of procedure you may take your omeprazole with a sip of water.  Hold all other medications.

## 2022-11-03 ENCOUNTER — TRANSFERRED RECORDS (OUTPATIENT)
Dept: HEALTH INFORMATION MANAGEMENT | Facility: CLINIC | Age: 77
End: 2022-11-03

## 2022-11-11 ENCOUNTER — LAB (OUTPATIENT)
Dept: LAB | Facility: CLINIC | Age: 77
End: 2022-11-11
Payer: COMMERCIAL

## 2022-11-11 DIAGNOSIS — Z20.822 ENCOUNTER FOR LABORATORY TESTING FOR COVID-19 VIRUS: ICD-10-CM

## 2022-11-11 LAB — SARS-COV-2 RNA RESP QL NAA+PROBE: NEGATIVE

## 2022-11-11 PROCEDURE — U0005 INFEC AGEN DETEC AMPLI PROBE: HCPCS

## 2022-11-11 PROCEDURE — U0003 INFECTIOUS AGENT DETECTION BY NUCLEIC ACID (DNA OR RNA); SEVERE ACUTE RESPIRATORY SYNDROME CORONAVIRUS 2 (SARS-COV-2) (CORONAVIRUS DISEASE [COVID-19]), AMPLIFIED PROBE TECHNIQUE, MAKING USE OF HIGH THROUGHPUT TECHNOLOGIES AS DESCRIBED BY CMS-2020-01-R: HCPCS

## 2022-11-15 ENCOUNTER — ANESTHESIA (OUTPATIENT)
Dept: MEDSURG UNIT | Facility: HOSPITAL | Age: 77
End: 2022-11-15
Payer: COMMERCIAL

## 2022-11-15 ENCOUNTER — ANESTHESIA EVENT (OUTPATIENT)
Dept: MEDSURG UNIT | Facility: HOSPITAL | Age: 77
End: 2022-11-15
Payer: COMMERCIAL

## 2022-11-15 ENCOUNTER — HOSPITAL ENCOUNTER (OUTPATIENT)
Facility: HOSPITAL | Age: 77
Discharge: HOME | End: 2022-11-15
Attending: UROLOGY | Admitting: UROLOGY
Payer: COMMERCIAL

## 2022-11-15 VITALS
DIASTOLIC BLOOD PRESSURE: 77 MMHG | WEIGHT: 207.8 LBS | HEART RATE: 73 BPM | OXYGEN SATURATION: 98 % | BODY MASS INDEX: 32.55 KG/M2 | TEMPERATURE: 97.9 F | RESPIRATION RATE: 18 BRPM | SYSTOLIC BLOOD PRESSURE: 141 MMHG

## 2022-11-15 PROCEDURE — 258N000003 HC RX IP 258 OP 636: Performed by: ANESTHESIOLOGY

## 2022-11-15 PROCEDURE — 999N000141 HC STATISTIC PRE-PROCEDURE NURSING ASSESSMENT: Performed by: UROLOGY

## 2022-11-15 RX ORDER — CEFAZOLIN SODIUM/WATER 2 G/20 ML
2 SYRINGE (ML) INTRAVENOUS SEE ADMIN INSTRUCTIONS
Status: DISCONTINUED | OUTPATIENT
Start: 2022-11-15 | End: 2022-11-18 | Stop reason: HOSPADM

## 2022-11-15 RX ORDER — ACETAMINOPHEN 500 MG
1000 TABLET ORAL EVERY 6 HOURS PRN
COMMUNITY
End: 2024-02-01

## 2022-11-15 RX ORDER — HYDROMORPHONE HYDROCHLORIDE 1 MG/ML
0.2 INJECTION, SOLUTION INTRAMUSCULAR; INTRAVENOUS; SUBCUTANEOUS EVERY 5 MIN PRN
Status: CANCELLED | OUTPATIENT
Start: 2022-11-15

## 2022-11-15 RX ORDER — FENTANYL CITRATE 50 UG/ML
25 INJECTION, SOLUTION INTRAMUSCULAR; INTRAVENOUS EVERY 5 MIN PRN
Status: CANCELLED | OUTPATIENT
Start: 2022-11-15

## 2022-11-15 RX ORDER — ONDANSETRON 4 MG/1
4 TABLET, ORALLY DISINTEGRATING ORAL EVERY 30 MIN PRN
Status: DISCONTINUED | OUTPATIENT
Start: 2022-11-15 | End: 2022-11-18 | Stop reason: HOSPADM

## 2022-11-15 RX ORDER — LIDOCAINE 40 MG/G
CREAM TOPICAL
Status: DISCONTINUED | OUTPATIENT
Start: 2022-11-15 | End: 2022-11-18 | Stop reason: HOSPADM

## 2022-11-15 RX ORDER — SODIUM CHLORIDE, SODIUM LACTATE, POTASSIUM CHLORIDE, CALCIUM CHLORIDE 600; 310; 30; 20 MG/100ML; MG/100ML; MG/100ML; MG/100ML
INJECTION, SOLUTION INTRAVENOUS CONTINUOUS
Status: DISCONTINUED | OUTPATIENT
Start: 2022-11-15 | End: 2022-11-18 | Stop reason: HOSPADM

## 2022-11-15 RX ORDER — NAPROXEN 500 MG/1
500 TABLET ORAL 2 TIMES DAILY PRN
COMMUNITY
End: 2024-02-01

## 2022-11-15 RX ORDER — ONDANSETRON 2 MG/ML
4 INJECTION INTRAMUSCULAR; INTRAVENOUS EVERY 30 MIN PRN
Status: DISCONTINUED | OUTPATIENT
Start: 2022-11-15 | End: 2022-11-18 | Stop reason: HOSPADM

## 2022-11-15 RX ORDER — FENTANYL CITRATE 50 UG/ML
25 INJECTION, SOLUTION INTRAMUSCULAR; INTRAVENOUS
Status: CANCELLED | OUTPATIENT
Start: 2022-11-15

## 2022-11-15 RX ORDER — NALOXONE HYDROCHLORIDE 1 MG/ML
0.4 INJECTION INTRAMUSCULAR; INTRAVENOUS; SUBCUTANEOUS
Status: DISCONTINUED | OUTPATIENT
Start: 2022-11-15 | End: 2022-11-18 | Stop reason: HOSPADM

## 2022-11-15 RX ORDER — NALOXONE HYDROCHLORIDE 1 MG/ML
0.2 INJECTION INTRAMUSCULAR; INTRAVENOUS; SUBCUTANEOUS
Status: DISCONTINUED | OUTPATIENT
Start: 2022-11-15 | End: 2022-11-18 | Stop reason: HOSPADM

## 2022-11-15 RX ORDER — CEFAZOLIN SODIUM/WATER 2 G/20 ML
2 SYRINGE (ML) INTRAVENOUS
Status: DISCONTINUED | OUTPATIENT
Start: 2022-11-15 | End: 2022-11-18 | Stop reason: HOSPADM

## 2022-11-15 RX ORDER — OXYCODONE HYDROCHLORIDE 5 MG/1
5 TABLET ORAL EVERY 4 HOURS PRN
Status: DISCONTINUED | OUTPATIENT
Start: 2022-11-15 | End: 2022-11-18 | Stop reason: HOSPADM

## 2022-11-15 RX ORDER — MEPERIDINE HYDROCHLORIDE 25 MG/ML
12.5 INJECTION INTRAMUSCULAR; INTRAVENOUS; SUBCUTANEOUS
Status: DISCONTINUED | OUTPATIENT
Start: 2022-11-15 | End: 2022-11-18 | Stop reason: HOSPADM

## 2022-11-15 RX ADMIN — SODIUM CHLORIDE, POTASSIUM CHLORIDE, SODIUM LACTATE AND CALCIUM CHLORIDE: 600; 310; 30; 20 INJECTION, SOLUTION INTRAVENOUS at 13:03

## 2022-11-15 ASSESSMENT — ACTIVITIES OF DAILY LIVING (ADL)
ADLS_ACUITY_SCORE: 18

## 2022-11-15 ASSESSMENT — LIFESTYLE VARIABLES: TOBACCO_USE: 0

## 2022-11-15 NOTE — ANESTHESIA PREPROCEDURE EVALUATION
Anesthesia Pre-Procedure Evaluation    Patient: Rich Musa   MRN: [id number] : 1945        Procedure : Procedure(s):  CYSTOSCOPY, BIPOLAR TRANSURETHRAL RESECTION OF PROSTATE          Past Medical History:   Diagnosis Date     Benign prostatic hyperplasia with urinary hesitancy      BPH (benign prostatic hyperplasia)      Dyslipidemia      GERD with stricture      Nephrolithiasis      BRYN (obstructive sleep apnea)     cpap     Plantar fasciitis      Seborrheic dermatitis      Seborrheic keratosis      Trochanteric bursitis       Past Surgical History:   Procedure Laterality Date     HC KNEE SCOPE, DIAGNOSTIC      Description: Arthroscopy Knee Right;  Recorded: 2011;      Allergies   Allergen Reactions     Sulfa Drugs       Social History     Tobacco Use     Smoking status: Never     Smokeless tobacco: Never   Substance Use Topics     Alcohol use: No     Comment: Alcoholic Drinks/day: social 3 times per month      Wt Readings from Last 1 Encounters:   11/15/22 94.3 kg (207 lb 12.8 oz)        Anesthesia Evaluation   Pt has had prior anesthetic. Type: General.    No history of anesthetic complications       ROS/MED HX  ENT/Pulmonary:     (+) sleep apnea, uses CPAP,  (-) tobacco use   Neurologic:  - neg neurologic ROS     Cardiovascular:     (+) Dyslipidemia ----- (-) murmur   METS/Exercise Tolerance: >4 METS    Hematologic:  - neg hematologic  ROS     Musculoskeletal:  - neg musculoskeletal ROS     GI/Hepatic:     (+) GERD,     Renal/Genitourinary:     (+) renal disease,     Endo:  - neg endo ROS     Psychiatric/Substance Use:  - neg psychiatric ROS     Infectious Disease:  - neg infectious disease ROS     Malignancy:  - neg malignancy ROS     Other:            Physical Exam    Airway        Mallampati: II   TM distance: > 3 FB   Neck ROM: full   Mouth opening: > 3 cm    Respiratory Devices and Support         Dental  no notable dental history         Cardiovascular          Rhythm and rate: regular  and normal (-) no murmur    Pulmonary           breath sounds clear to auscultation           OUTSIDE LABS:  CBC:   Lab Results   Component Value Date    WBC 5.8 04/11/2022    WBC 6.4 12/16/2021    HGB 14.4 11/01/2022    HGB 14.7 04/11/2022    HCT 45.0 04/11/2022    HCT 45.2 12/16/2021     04/11/2022     12/16/2021     BMP:   Lab Results   Component Value Date     11/01/2022     04/11/2022    POTASSIUM 4.0 11/01/2022    POTASSIUM 4.7 04/11/2022    CHLORIDE 107 11/01/2022    CHLORIDE 106 04/11/2022    CO2 24 11/01/2022    CO2 26 04/11/2022    BUN 20.0 11/01/2022    BUN 17 04/11/2022    CR 0.94 11/01/2022    CR 0.81 04/11/2022    GLC 97 11/01/2022    GLC 93 04/11/2022     COAGS: No results found for: PTT, INR, FIBR  POC: No results found for: BGM, HCG, HCGS  HEPATIC:   Lab Results   Component Value Date    ALBUMIN 4.4 04/11/2022    PROTTOTAL 7.3 04/11/2022    ALT 16 04/11/2022    AST 29 04/11/2022    ALKPHOS 53 04/11/2022    BILITOTAL 1.0 04/11/2022     OTHER:   Lab Results   Component Value Date    LATISHA 9.3 11/01/2022    TSH 3.24 04/11/2022    SED 13 08/02/2021       Anesthesia Plan    ASA Status:  3   NPO Status:  NPO Appropriate    Anesthesia Type: General.     - Airway: ETT   Induction: Intravenous, Propofol.   Maintenance: Balanced.        Consents    Anesthesia Plan(s) and associated risks, benefits, and realistic alternatives discussed. Questions answered and patient/representative(s) expressed understanding.     - Discussed: Risks, Benefits and Alternatives for BOTH SEDATION and the PROCEDURE were discussed     - Discussed with:  Patient      - Extended Intubation/Ventilatory Support Discussed: Yes.         Postoperative Care    Pain management: IV analgesics, Oral pain medications, Multi-modal analgesia.   PONV prophylaxis: Ondansetron (or other 5HT-3), Dexamethasone or Solumedrol     Comments:                Brandon Marie MD

## 2022-11-15 NOTE — INTERVAL H&P NOTE
"I have reviewed the surgical (or preoperative) H&P that is linked to this encounter, and examined the patient. There are no significant changes    Clinical Conditions Present on Arrival:  Clinically Significant Risk Factors Present on Admission                    # Obesity: Estimated body mass index is 32.55 kg/m  as calculated from the following:    Height as of 11/1/22: 1.702 m (5' 7\").    Weight as of this encounter: 94.3 kg (207 lb 12.8 oz).       "

## 2022-11-15 NOTE — PHARMACY-ADMISSION MEDICATION HISTORY
Pharmacy Note - Admission Medication History    Pertinent Provider Information: n/a   ______________________________________________________________________    Prior To Admission (PTA) med list completed and updated in EMR.       PTA Med List   Medication Sig Last Dose     acetaminophen (TYLENOL) 500 MG tablet Take 1,000 mg by mouth every 6 hours as needed for mild pain Past Month at PRN     atorvastatin (LIPITOR) 10 MG tablet Take 1 tablet (10 mg) by mouth daily 11/14/2022 at AM     naproxen (NAPROSYN) 500 MG tablet Take 500 mg by mouth 2 times daily as needed Past Month at PRN     omeprazole (PRILOSEC) 20 MG DR capsule Take 20 mg by mouth daily before breakfast 11/14/2022 at AM     tamsulosin (FLOMAX) 0.4 MG capsule Take 0.4 mg by mouth At Bedtime 11/14/2022 at HS       Information source(s): Patient and [facility]    Method of interview communication: in-person    Patient was asked about OTC/herbal products specifically.  PTA med list reflects this.    Based on the pharmacist's assessment, the PTA med list information appears reliable    Allergies were reviewed, assessed, and updated with the patient.      Patient does not use any multi-dose medications prior to admission.     Thank you for the opportunity to participate in the care of this patient.      Lisbeth Chester [facility]     11/15/2022     1:03 PM

## 2022-11-16 ASSESSMENT — ACTIVITIES OF DAILY LIVING (ADL)
ADLS_ACUITY_SCORE: 18

## 2022-11-17 ASSESSMENT — ACTIVITIES OF DAILY LIVING (ADL)
ADLS_ACUITY_SCORE: 18

## 2022-11-18 ASSESSMENT — ACTIVITIES OF DAILY LIVING (ADL)
ADLS_ACUITY_SCORE: 18

## 2022-11-25 ENCOUNTER — LAB (OUTPATIENT)
Dept: LAB | Facility: CLINIC | Age: 77
End: 2022-11-25
Payer: COMMERCIAL

## 2022-11-25 DIAGNOSIS — Z20.822 ENCOUNTER FOR LABORATORY TESTING FOR COVID-19 VIRUS: ICD-10-CM

## 2022-11-25 LAB — SARS-COV-2 RNA RESP QL NAA+PROBE: NEGATIVE

## 2022-11-25 PROCEDURE — U0005 INFEC AGEN DETEC AMPLI PROBE: HCPCS

## 2022-11-25 PROCEDURE — U0003 INFECTIOUS AGENT DETECTION BY NUCLEIC ACID (DNA OR RNA); SEVERE ACUTE RESPIRATORY SYNDROME CORONAVIRUS 2 (SARS-COV-2) (CORONAVIRUS DISEASE [COVID-19]), AMPLIFIED PROBE TECHNIQUE, MAKING USE OF HIGH THROUGHPUT TECHNOLOGIES AS DESCRIBED BY CMS-2020-01-R: HCPCS

## 2022-11-28 ENCOUNTER — ANESTHESIA EVENT (OUTPATIENT)
Dept: SURGERY | Facility: HOSPITAL | Age: 77
End: 2022-11-28
Payer: COMMERCIAL

## 2022-11-28 NOTE — ANESTHESIA PREPROCEDURE EVALUATION
Anesthesia Pre-Procedure Evaluation    Patient: Rich Musa   MRN: [id number] : 1945        Procedure : Procedure(s):  CYSTOSCOPY,  BIPOLAR TRANSURETHRAL RESECTION OF PROSTATE          Past Medical History:   Diagnosis Date     Benign prostatic hyperplasia with urinary hesitancy      BPH (benign prostatic hyperplasia)      Dyslipidemia      GERD with stricture      Nephrolithiasis      BRYN (obstructive sleep apnea)     cpap     Plantar fasciitis      Seborrheic dermatitis      Seborrheic keratosis      Trochanteric bursitis       Past Surgical History:   Procedure Laterality Date     HC KNEE SCOPE, DIAGNOSTIC      Description: Arthroscopy Knee Right;  Recorded: 2011;      Allergies   Allergen Reactions     Sulfa Drugs Rash      Social History     Tobacco Use     Smoking status: Never     Smokeless tobacco: Never   Substance Use Topics     Alcohol use: No     Comment: Alcoholic Drinks/day: social 3 times per month      Wt Readings from Last 1 Encounters:   11/15/22 94.3 kg (207 lb 12.8 oz)        Anesthesia Evaluation            ROS/MED HX  ENT/Pulmonary:     (+) sleep apnea, moderate, uses CPAP,     Neurologic:  - neg neurologic ROS     Cardiovascular:     (+) Dyslipidemia -----    METS/Exercise Tolerance:     Hematologic:  - neg hematologic  ROS     Musculoskeletal:  - neg musculoskeletal ROS     GI/Hepatic:  - neg GI/hepatic ROS   (+) GERD, Asymptomatic on medication,     Renal/Genitourinary:  - neg Renal ROS   (+) renal disease,     Endo:  - neg endo ROS     Psychiatric/Substance Use:  - neg psychiatric ROS     Infectious Disease:  - neg infectious disease ROS     Malignancy:  - neg malignancy ROS     Other:  - neg other ROS             OUTSIDE LABS:  CBC:   Lab Results   Component Value Date    WBC 5.8 2022    WBC 6.4 2021    HGB 14.4 2022    HGB 14.7 2022    HCT 45.0 2022    HCT 45.2 2021     2022     2021     BMP:   Lab Results    Component Value Date     11/01/2022     04/11/2022    POTASSIUM 4.0 11/01/2022    POTASSIUM 4.7 04/11/2022    CHLORIDE 107 11/01/2022    CHLORIDE 106 04/11/2022    CO2 24 11/01/2022    CO2 26 04/11/2022    BUN 20.0 11/01/2022    BUN 17 04/11/2022    CR 0.94 11/01/2022    CR 0.81 04/11/2022    GLC 97 11/01/2022    GLC 93 04/11/2022     COAGS: No results found for: PTT, INR, FIBR  POC: No results found for: BGM, HCG, HCGS  HEPATIC:   Lab Results   Component Value Date    ALBUMIN 4.4 04/11/2022    PROTTOTAL 7.3 04/11/2022    ALT 16 04/11/2022    AST 29 04/11/2022    ALKPHOS 53 04/11/2022    BILITOTAL 1.0 04/11/2022     OTHER:   Lab Results   Component Value Date    LATISHA 9.3 11/01/2022    TSH 3.24 04/11/2022    SED 13 08/02/2021       Anesthesia Plan    ASA Status:  3      Anesthesia Type: General.     - Airway: LMA   Induction: Intravenous, Propofol.   Maintenance: Balanced.        Consents    Anesthesia Plan(s) and associated risks, benefits, and realistic alternatives discussed. Questions answered and patient/representative(s) expressed understanding.    - Discussed:     - Discussed with:  Patient      - Extended Intubation/Ventilatory Support Discussed: No.      - Patient is DNR/DNI Status: No    Use of blood products discussed: No .     Postoperative Care    Pain management: IV analgesics.   PONV prophylaxis: Ondansetron (or other 5HT-3), Dexamethasone or Solumedrol     Comments:    Other Comments: GALMA  Pre op tylenol  Antiemetics - zofran 4mg/decadron 8mg (4mg if patient is diabetic)  Toradol at the end of the case if okay with the surgeon            Shaan Liu MD

## 2022-11-29 ENCOUNTER — ANESTHESIA (OUTPATIENT)
Dept: SURGERY | Facility: HOSPITAL | Age: 77
End: 2022-11-29
Payer: COMMERCIAL

## 2022-11-29 ENCOUNTER — HOSPITAL ENCOUNTER (OUTPATIENT)
Facility: HOSPITAL | Age: 77
Discharge: HOME | End: 2022-11-30
Attending: UROLOGY | Admitting: UROLOGY
Payer: COMMERCIAL

## 2022-11-29 DIAGNOSIS — N40.1 BENIGN PROSTATIC HYPERPLASIA WITH URINARY HESITANCY: Primary | ICD-10-CM

## 2022-11-29 DIAGNOSIS — R39.11 BENIGN PROSTATIC HYPERPLASIA WITH URINARY HESITANCY: Primary | ICD-10-CM

## 2022-11-29 PROCEDURE — 88305 TISSUE EXAM BY PATHOLOGIST: CPT | Mod: TC | Performed by: UROLOGY

## 2022-11-29 PROCEDURE — 258N000003 HC RX IP 258 OP 636: Performed by: UROLOGY

## 2022-11-29 PROCEDURE — 999N000141 HC STATISTIC PRE-PROCEDURE NURSING ASSESSMENT: Performed by: UROLOGY

## 2022-11-29 PROCEDURE — 258N000001 HC RX 258: Performed by: UROLOGY

## 2022-11-29 PROCEDURE — 250N000011 HC RX IP 250 OP 636: Performed by: UROLOGY

## 2022-11-29 PROCEDURE — 250N000011 HC RX IP 250 OP 636: Performed by: NURSE ANESTHETIST, CERTIFIED REGISTERED

## 2022-11-29 PROCEDURE — 250N000009 HC RX 250: Performed by: NURSE ANESTHETIST, CERTIFIED REGISTERED

## 2022-11-29 PROCEDURE — 96372 THER/PROPH/DIAG INJ SC/IM: CPT | Performed by: UROLOGY

## 2022-11-29 PROCEDURE — 710N000009 HC RECOVERY PHASE 1, LEVEL 1, PER MIN: Performed by: UROLOGY

## 2022-11-29 PROCEDURE — 250N000009 HC RX 250: Performed by: UROLOGY

## 2022-11-29 PROCEDURE — 250N000025 HC SEVOFLURANE, PER MIN: Performed by: UROLOGY

## 2022-11-29 PROCEDURE — 360N000076 HC SURGERY LEVEL 3, PER MIN: Performed by: UROLOGY

## 2022-11-29 PROCEDURE — 250N000011 HC RX IP 250 OP 636: Performed by: NURSE PRACTITIONER

## 2022-11-29 PROCEDURE — 258N000003 HC RX IP 258 OP 636: Performed by: NURSE ANESTHETIST, CERTIFIED REGISTERED

## 2022-11-29 PROCEDURE — 250N000013 HC RX MED GY IP 250 OP 250 PS 637: Performed by: UROLOGY

## 2022-11-29 PROCEDURE — 272N000001 HC OR GENERAL SUPPLY STERILE: Performed by: UROLOGY

## 2022-11-29 PROCEDURE — 250N000011 HC RX IP 250 OP 636: Performed by: ANESTHESIOLOGY

## 2022-11-29 PROCEDURE — 258N000003 HC RX IP 258 OP 636: Performed by: ANESTHESIOLOGY

## 2022-11-29 PROCEDURE — 250N000013 HC RX MED GY IP 250 OP 250 PS 637: Performed by: ANESTHESIOLOGY

## 2022-11-29 PROCEDURE — 370N000017 HC ANESTHESIA TECHNICAL FEE, PER MIN: Performed by: UROLOGY

## 2022-11-29 RX ORDER — ONDANSETRON 2 MG/ML
4 INJECTION INTRAMUSCULAR; INTRAVENOUS EVERY 30 MIN PRN
Status: DISCONTINUED | OUTPATIENT
Start: 2022-11-29 | End: 2022-11-29 | Stop reason: HOSPADM

## 2022-11-29 RX ORDER — ATROPA BELLADONNA AND OPIUM 16.2; 3 MG/1; MG/1
30 SUPPOSITORY RECTAL 3 TIMES DAILY PRN
Status: DISCONTINUED | OUTPATIENT
Start: 2022-11-29 | End: 2022-11-30 | Stop reason: HOSPADM

## 2022-11-29 RX ORDER — ONDANSETRON 4 MG/1
4 TABLET, ORALLY DISINTEGRATING ORAL EVERY 6 HOURS PRN
Status: DISCONTINUED | OUTPATIENT
Start: 2022-11-29 | End: 2022-11-30 | Stop reason: HOSPADM

## 2022-11-29 RX ORDER — ATORVASTATIN CALCIUM 10 MG/1
10 TABLET, FILM COATED ORAL DAILY
Status: DISCONTINUED | OUTPATIENT
Start: 2022-11-30 | End: 2022-11-30 | Stop reason: HOSPADM

## 2022-11-29 RX ORDER — LIDOCAINE 50 MG/G
OINTMENT TOPICAL 4 TIMES DAILY PRN
Status: DISCONTINUED | OUTPATIENT
Start: 2022-11-29 | End: 2022-11-30 | Stop reason: HOSPADM

## 2022-11-29 RX ORDER — PROPOFOL 10 MG/ML
INJECTION, EMULSION INTRAVENOUS PRN
Status: DISCONTINUED | OUTPATIENT
Start: 2022-11-29 | End: 2022-11-29

## 2022-11-29 RX ORDER — HYDROCODONE BITARTRATE AND ACETAMINOPHEN 5; 325 MG/1; MG/1
1-2 TABLET ORAL EVERY 4 HOURS PRN
Status: DISCONTINUED | OUTPATIENT
Start: 2022-11-29 | End: 2022-11-30 | Stop reason: HOSPADM

## 2022-11-29 RX ORDER — HYDROMORPHONE HYDROCHLORIDE 1 MG/ML
0.4 INJECTION, SOLUTION INTRAMUSCULAR; INTRAVENOUS; SUBCUTANEOUS
Status: DISCONTINUED | OUTPATIENT
Start: 2022-11-29 | End: 2022-11-30 | Stop reason: HOSPADM

## 2022-11-29 RX ORDER — LIDOCAINE 40 MG/G
CREAM TOPICAL
Status: DISCONTINUED | OUTPATIENT
Start: 2022-11-29 | End: 2022-11-29 | Stop reason: HOSPADM

## 2022-11-29 RX ORDER — HEPARIN SODIUM 5000 [USP'U]/.5ML
5000 INJECTION, SOLUTION INTRAVENOUS; SUBCUTANEOUS EVERY 12 HOURS
Status: DISCONTINUED | OUTPATIENT
Start: 2022-11-29 | End: 2022-11-30 | Stop reason: HOSPADM

## 2022-11-29 RX ORDER — HYDROMORPHONE HYDROCHLORIDE 1 MG/ML
0.4 INJECTION, SOLUTION INTRAMUSCULAR; INTRAVENOUS; SUBCUTANEOUS EVERY 5 MIN PRN
Status: DISCONTINUED | OUTPATIENT
Start: 2022-11-29 | End: 2022-11-29 | Stop reason: HOSPADM

## 2022-11-29 RX ORDER — SODIUM CHLORIDE, SODIUM LACTATE, POTASSIUM CHLORIDE, CALCIUM CHLORIDE 600; 310; 30; 20 MG/100ML; MG/100ML; MG/100ML; MG/100ML
INJECTION, SOLUTION INTRAVENOUS CONTINUOUS
Status: DISCONTINUED | OUTPATIENT
Start: 2022-11-29 | End: 2022-11-29 | Stop reason: HOSPADM

## 2022-11-29 RX ORDER — ACETAMINOPHEN 325 MG/1
975 TABLET ORAL EVERY 4 HOURS PRN
Status: DISCONTINUED | OUTPATIENT
Start: 2022-11-29 | End: 2022-11-29

## 2022-11-29 RX ORDER — HYDROMORPHONE HYDROCHLORIDE 1 MG/ML
0.2 INJECTION, SOLUTION INTRAMUSCULAR; INTRAVENOUS; SUBCUTANEOUS EVERY 5 MIN PRN
Status: DISCONTINUED | OUTPATIENT
Start: 2022-11-29 | End: 2022-11-29 | Stop reason: HOSPADM

## 2022-11-29 RX ORDER — NEOMYCIN/BACITRACIN/POLYMYXINB 3.5-400-5K
OINTMENT (GRAM) TOPICAL 4 TIMES DAILY PRN
Status: DISCONTINUED | OUTPATIENT
Start: 2022-11-29 | End: 2022-11-30 | Stop reason: HOSPADM

## 2022-11-29 RX ORDER — PANTOPRAZOLE SODIUM 40 MG/1
40 TABLET, DELAYED RELEASE ORAL
Status: DISCONTINUED | OUTPATIENT
Start: 2022-11-30 | End: 2022-11-30 | Stop reason: HOSPADM

## 2022-11-29 RX ORDER — FENTANYL CITRATE 50 UG/ML
50 INJECTION, SOLUTION INTRAMUSCULAR; INTRAVENOUS EVERY 5 MIN PRN
Status: DISCONTINUED | OUTPATIENT
Start: 2022-11-29 | End: 2022-11-29 | Stop reason: HOSPADM

## 2022-11-29 RX ORDER — CEFAZOLIN SODIUM/WATER 2 G/20 ML
2 SYRINGE (ML) INTRAVENOUS SEE ADMIN INSTRUCTIONS
Status: DISCONTINUED | OUTPATIENT
Start: 2022-11-29 | End: 2022-11-29 | Stop reason: HOSPADM

## 2022-11-29 RX ORDER — GLYCOPYRROLATE 0.2 MG/ML
INJECTION, SOLUTION INTRAMUSCULAR; INTRAVENOUS PRN
Status: DISCONTINUED | OUTPATIENT
Start: 2022-11-29 | End: 2022-11-29

## 2022-11-29 RX ORDER — FENTANYL CITRATE 50 UG/ML
25 INJECTION, SOLUTION INTRAMUSCULAR; INTRAVENOUS EVERY 5 MIN PRN
Status: DISCONTINUED | OUTPATIENT
Start: 2022-11-29 | End: 2022-11-29 | Stop reason: HOSPADM

## 2022-11-29 RX ORDER — DEXAMETHASONE SODIUM PHOSPHATE 10 MG/ML
INJECTION, SOLUTION INTRAMUSCULAR; INTRAVENOUS PRN
Status: DISCONTINUED | OUTPATIENT
Start: 2022-11-29 | End: 2022-11-29

## 2022-11-29 RX ORDER — CEFAZOLIN SODIUM/WATER 2 G/20 ML
2 SYRINGE (ML) INTRAVENOUS
Status: DISCONTINUED | OUTPATIENT
Start: 2022-11-29 | End: 2022-11-29 | Stop reason: HOSPADM

## 2022-11-29 RX ORDER — ONDANSETRON 4 MG/1
4 TABLET, ORALLY DISINTEGRATING ORAL EVERY 30 MIN PRN
Status: DISCONTINUED | OUTPATIENT
Start: 2022-11-29 | End: 2022-11-29 | Stop reason: HOSPADM

## 2022-11-29 RX ORDER — LIDOCAINE 40 MG/G
CREAM TOPICAL
Status: DISCONTINUED | OUTPATIENT
Start: 2022-11-29 | End: 2022-11-30 | Stop reason: HOSPADM

## 2022-11-29 RX ORDER — HYDROMORPHONE HYDROCHLORIDE 1 MG/ML
0.2 INJECTION, SOLUTION INTRAMUSCULAR; INTRAVENOUS; SUBCUTANEOUS
Status: DISCONTINUED | OUTPATIENT
Start: 2022-11-29 | End: 2022-11-30 | Stop reason: HOSPADM

## 2022-11-29 RX ORDER — ONDANSETRON 2 MG/ML
4 INJECTION INTRAMUSCULAR; INTRAVENOUS EVERY 6 HOURS PRN
Status: DISCONTINUED | OUTPATIENT
Start: 2022-11-29 | End: 2022-11-30 | Stop reason: HOSPADM

## 2022-11-29 RX ORDER — FENTANYL CITRATE 50 UG/ML
INJECTION, SOLUTION INTRAMUSCULAR; INTRAVENOUS PRN
Status: DISCONTINUED | OUTPATIENT
Start: 2022-11-29 | End: 2022-11-29

## 2022-11-29 RX ORDER — POLYETHYLENE GLYCOL 3350 17 G/17G
17 POWDER, FOR SOLUTION ORAL 2 TIMES DAILY PRN
Status: DISCONTINUED | OUTPATIENT
Start: 2022-11-29 | End: 2022-11-30 | Stop reason: HOSPADM

## 2022-11-29 RX ORDER — TAMSULOSIN HYDROCHLORIDE 0.4 MG/1
0.4 CAPSULE ORAL AT BEDTIME
Status: DISCONTINUED | OUTPATIENT
Start: 2022-11-29 | End: 2022-11-30 | Stop reason: HOSPADM

## 2022-11-29 RX ORDER — LIDOCAINE HYDROCHLORIDE 10 MG/ML
INJECTION, SOLUTION INFILTRATION; PERINEURAL PRN
Status: DISCONTINUED | OUTPATIENT
Start: 2022-11-29 | End: 2022-11-29

## 2022-11-29 RX ORDER — MEPERIDINE HYDROCHLORIDE 25 MG/ML
12.5 INJECTION INTRAMUSCULAR; INTRAVENOUS; SUBCUTANEOUS
Status: DISCONTINUED | OUTPATIENT
Start: 2022-11-29 | End: 2022-11-29 | Stop reason: HOSPADM

## 2022-11-29 RX ORDER — SODIUM CHLORIDE 9 MG/ML
INJECTION, SOLUTION INTRAVENOUS CONTINUOUS
Status: DISCONTINUED | OUTPATIENT
Start: 2022-11-29 | End: 2022-11-30 | Stop reason: HOSPADM

## 2022-11-29 RX ORDER — ONDANSETRON 2 MG/ML
INJECTION INTRAMUSCULAR; INTRAVENOUS PRN
Status: DISCONTINUED | OUTPATIENT
Start: 2022-11-29 | End: 2022-11-29

## 2022-11-29 RX ORDER — ACETAMINOPHEN 325 MG/1
650 TABLET ORAL EVERY 6 HOURS PRN
Status: DISCONTINUED | OUTPATIENT
Start: 2022-11-29 | End: 2022-11-30 | Stop reason: HOSPADM

## 2022-11-29 RX ORDER — FENTANYL CITRATE 50 UG/ML
25 INJECTION, SOLUTION INTRAMUSCULAR; INTRAVENOUS
Status: DISCONTINUED | OUTPATIENT
Start: 2022-11-29 | End: 2022-11-29 | Stop reason: HOSPADM

## 2022-11-29 RX ADMIN — FENTANYL CITRATE 50 MCG: 50 INJECTION INTRAMUSCULAR; INTRAVENOUS at 15:04

## 2022-11-29 RX ADMIN — SODIUM CHLORIDE: 900 IRRIGANT IRRIGATION at 17:23

## 2022-11-29 RX ADMIN — PROPOFOL 30 MG: 10 INJECTION, EMULSION INTRAVENOUS at 13:38

## 2022-11-29 RX ADMIN — FENTANYL CITRATE 50 MCG: 50 INJECTION, SOLUTION INTRAMUSCULAR; INTRAVENOUS at 13:31

## 2022-11-29 RX ADMIN — FENTANYL CITRATE 50 MCG: 50 INJECTION, SOLUTION INTRAMUSCULAR; INTRAVENOUS at 13:44

## 2022-11-29 RX ADMIN — DEXAMETHASONE SODIUM PHOSPHATE 8 MG: 10 INJECTION, SOLUTION INTRAMUSCULAR; INTRAVENOUS at 12:59

## 2022-11-29 RX ADMIN — ONDANSETRON 4 MG: 2 INJECTION INTRAMUSCULAR; INTRAVENOUS at 14:00

## 2022-11-29 RX ADMIN — SODIUM CHLORIDE: 9 INJECTION, SOLUTION INTRAVENOUS at 17:23

## 2022-11-29 RX ADMIN — PROPOFOL 20 MG: 10 INJECTION, EMULSION INTRAVENOUS at 13:30

## 2022-11-29 RX ADMIN — TAMSULOSIN HYDROCHLORIDE 0.4 MG: 0.4 CAPSULE ORAL at 21:09

## 2022-11-29 RX ADMIN — PROPOFOL 40 MG: 10 INJECTION, EMULSION INTRAVENOUS at 14:03

## 2022-11-29 RX ADMIN — GLYCOPYRROLATE 0.2 MG: 0.2 INJECTION, SOLUTION INTRAMUSCULAR; INTRAVENOUS at 13:16

## 2022-11-29 RX ADMIN — FENTANYL CITRATE 100 MCG: 50 INJECTION, SOLUTION INTRAMUSCULAR; INTRAVENOUS at 12:59

## 2022-11-29 RX ADMIN — PROPOFOL 20 MG: 10 INJECTION, EMULSION INTRAVENOUS at 13:45

## 2022-11-29 RX ADMIN — Medication 2 G: at 12:54

## 2022-11-29 RX ADMIN — ACETAMINOPHEN 975 MG: 325 TABLET, FILM COATED ORAL at 12:49

## 2022-11-29 RX ADMIN — LIDOCAINE HYDROCHLORIDE 50 MG: 10 INJECTION, SOLUTION INFILTRATION; PERINEURAL at 12:59

## 2022-11-29 RX ADMIN — GLYCOPYRROLATE 0.2 MG: 0.2 INJECTION, SOLUTION INTRAMUSCULAR; INTRAVENOUS at 13:20

## 2022-11-29 RX ADMIN — SODIUM CHLORIDE, POTASSIUM CHLORIDE, SODIUM LACTATE AND CALCIUM CHLORIDE: 600; 310; 30; 20 INJECTION, SOLUTION INTRAVENOUS at 13:52

## 2022-11-29 RX ADMIN — SODIUM CHLORIDE, POTASSIUM CHLORIDE, SODIUM LACTATE AND CALCIUM CHLORIDE: 600; 310; 30; 20 INJECTION, SOLUTION INTRAVENOUS at 11:13

## 2022-11-29 RX ADMIN — PROPOFOL 180 MG: 10 INJECTION, EMULSION INTRAVENOUS at 12:59

## 2022-11-29 RX ADMIN — PHENYLEPHRINE HYDROCHLORIDE 100 MCG: 10 INJECTION INTRAVENOUS at 14:10

## 2022-11-29 RX ADMIN — ACETAMINOPHEN 975 MG: 325 TABLET, FILM COATED ORAL at 17:18

## 2022-11-29 RX ADMIN — HEPARIN SODIUM 5000 UNITS: 5000 INJECTION, SOLUTION INTRAVENOUS; SUBCUTANEOUS at 21:09

## 2022-11-29 RX ADMIN — ACETAMINOPHEN 650 MG: 325 TABLET ORAL at 21:09

## 2022-11-29 ASSESSMENT — ACTIVITIES OF DAILY LIVING (ADL)
ADLS_ACUITY_SCORE: 19
ADLS_ACUITY_SCORE: 19
ADLS_ACUITY_SCORE: 18
ADLS_ACUITY_SCORE: 18
ADLS_ACUITY_SCORE: 19
ADLS_ACUITY_SCORE: 18
ADLS_ACUITY_SCORE: 18

## 2022-11-29 NOTE — ANESTHESIA POSTPROCEDURE EVALUATION
Patient: Rich Musa    Procedure: Procedure(s):  CYSTOSCOPY,  BIPOLAR TRANSURETHRAL RESECTION OF PROSTATE       Anesthesia Type:  General    Note:  Disposition: Outpatient   Postop Pain Control: Uneventful            Sign Out: Well controlled pain   PONV: No   Neuro/Psych: Uneventful            Sign Out: Acceptable/Baseline neuro status   Airway/Respiratory: Uneventful            Sign Out: Acceptable/Baseline resp. status   CV/Hemodynamics: Uneventful            Sign Out: Acceptable CV status; No obvious hypovolemia; No obvious fluid overload   Other NRE:    DID A NON-ROUTINE EVENT OCCUR?            Last vitals:  Vitals Value Taken Time   /78 11/29/22 1500   Temp 36.2  C (97.1  F) 11/29/22 1435   Pulse 64 11/29/22 1510   Resp 19 11/29/22 1510   SpO2 93 % 11/29/22 1510   Vitals shown include unvalidated device data.    Electronically Signed By: Millie White MD  November 29, 2022  3:12 PM

## 2022-11-29 NOTE — ANESTHESIA PROCEDURE NOTES
Airway       Patient location during procedure: OR       Procedure Start/Stop Times: 11/29/2022 1:00 PM  Staff -        Anesthesiologist:  Shaan Liu MD       CRNA: Fani De La Cruz APRN CRNA       Other Anesthesia Staff: Lorena Garcia       Performed By: CRNA and SRNAIndications and Patient Condition       Indications for airway management: pia-procedural       Induction type:intravenous       Mask difficulty assessment: 0 - not attempted    Final Airway Details       Final airway type: supraglottic airway    Supraglottic Airway Details        Type: LMA       Brand: Ambu AuraGain       LMA size: 5    Post intubation assessment        Placement verified by: capnometry and chest rise        Number of attempts at approach: 1       Number of other approaches attempted: 0       Secured with: commercial tube hines       Ease of procedure: easy       Dentition: Intact and Unchanged    Medication(s) Administered   Medication Administration Time: 11/29/2022 1:00 PM

## 2022-11-29 NOTE — PHARMACY-ADMISSION MEDICATION HISTORY
Pharmacy Note - Admission Medication History    Pertinent Provider Information: None   ______________________________________________________________________    Prior To Admission (PTA) med list completed and updated in EMR.       PTA Med List   Medication Sig Last Dose     acetaminophen (TYLENOL) 500 MG tablet Take 1,000 mg by mouth every 6 hours as needed for mild pain      atorvastatin (LIPITOR) 10 MG tablet Take 1 tablet (10 mg) by mouth daily 11/28/2022     Calcium Citrate-Vitamin D (CALCIUM CITRATE +D PO) Take 1 tablet by mouth 2 times daily Past Week     Multiple Vitamins-Minerals (MULTIVITAMIN ADULT PO) Take 1 tablet by mouth daily Past Week     naproxen (NAPROSYN) 500 MG tablet Take 500 mg by mouth 2 times daily as needed      omeprazole (PRILOSEC) 20 MG DR capsule Take 20 mg by mouth daily 11/28/2022     tamsulosin (FLOMAX) 0.4 MG capsule Take 0.4 mg by mouth At Bedtime 11/28/2022 at pm       Information source(s): Patient and Hospital records    Method of interview communication: in-person    Patient was asked about OTC/herbal products specifically.  PTA med list reflects this.    Based on the pharmacist's assessment, the PTA med list information appears reliable    Allergies were reviewed, assessed, and updated with the patient.      Patient does not use any multi-dose medications prior to admission.     Thank you for the opportunity to participate in the care of this patient.      Ara Godfrey [facility]     11/29/2022     11:52 AM

## 2022-11-29 NOTE — OP NOTE
Location: [facility]     Patient Name: Rich Musa  Patient : 1945  Patient MRN: [id number]  Patient CSN: [id number]  Patient PCP: Antonio Hawley    Date of Service: 22     Pre-operative diagnosis: BPH and weak stream    Post-operative diagnosis: BPH and weak stream    Procedure:  Cystoscopy, bipolar transurethral resection of prostate    Surgeon: Dr. Shaan Grewal    EBL: 150 mL     Anesthesia: General    Indication: 77 year old male with BPH and weak stream.  He elected for a TURP.  Risks, benefits, and alternatives to treatment were discussed with the patient and the patient elected to proceed.    Findings: His urethra was normal.  His prostate had trilobar hyperplasia with some mild bladder neck elevation and a small median lobe.  Bilateral ureteral orifices were normal.  He had mild bladder trabeculation. No bladder tumors or stones were seen. After resection, with the camera at the verumontanum, and the bladder empty, I could see directly into the bladder.    Specimens: Prostate chips    Procedure:  After written informed consent was obtained the patient was brought into the operating room.  The patient was properly identified prior to the procedure, received preprocedure antibiotics, and had sequential compression devices on the legs.  The patient was then induced under anesthesia.    He was placed in dorsal lithotomy position and prepped and draped in the usual sterile fashion.  Cystoscopy was performed with the above findings.  The resectoscope was then placed into the bladder. Using saline irrigation and the bipolar generator,  I started resection on the left lateral lobe and proceeded distally in a systematic fashion.  I then proceeded to resect the prostate circumferentially.  With the camera at the verumontanum, and the bladder empty, I could see directly into the bladder.  No resection occurred distal to the verumontanum.  The ureteral orifices were not injured.  A 22 Fr 3-way  wood catheter was placed with 30 mL of water in the balloon.  The bladder was hand irrigated. Continuous bladder irrigation was started. At this point the procedure was completed. He tolerated this procedure well.    Plan: To the floor for CBI. Has voiding trial 12/1/22.    Shaan Grewal MD   2:18 PM

## 2022-11-29 NOTE — ANESTHESIA CARE TRANSFER NOTE
Patient: Rich Musa    Procedure: Procedure(s):  CYSTOSCOPY,  BIPOLAR TRANSURETHRAL RESECTION OF PROSTATE       Diagnosis: Benign prostatic hyperplasia with lower urinary tract symptoms, symptom details unspecified [N40.1]  Diagnosis Additional Information: No value filed.    Anesthesia Type:   General     Note:    Oropharynx: oropharynx clear of all foreign objects and spontaneously breathing  Level of Consciousness: drowsy  Oxygen Supplementation: face mask  Level of Supplemental Oxygen (L/min / FiO2): 8  Independent Airway: airway patency satisfactory and stable  Dentition: dentition unchanged  Vital Signs Stable: post-procedure vital signs reviewed and stable  Report to RN Given: handoff report given  Patient transferred to: PACU    Handoff Report: Identifed the Patient, Identified the Reponsible Provider, Reviewed the pertinent medical history, Discussed the surgical course, Reviewed Intra-OP anesthesia mangement and issues during anesthesia, Set expectations for post-procedure period and Allowed opportunity for questions and acknowledgement of understanding      Vitals:  Vitals Value Taken Time   /77 11/29/22 1431   Temp 98.2    Pulse 78 11/29/22 1432   Resp 6 11/29/22 1432   SpO2 98 % 11/29/22 1432   Vitals shown include unvalidated device data.    Electronically Signed By: BRENNA Rodriguez CRNA  November 29, 2022  2:34 PM

## 2022-11-30 VITALS
BODY MASS INDEX: 32.26 KG/M2 | TEMPERATURE: 97.4 F | DIASTOLIC BLOOD PRESSURE: 64 MMHG | RESPIRATION RATE: 16 BRPM | HEART RATE: 77 BPM | SYSTOLIC BLOOD PRESSURE: 124 MMHG | WEIGHT: 206 LBS | OXYGEN SATURATION: 92 %

## 2022-11-30 LAB
ANION GAP SERPL CALCULATED.3IONS-SCNC: 10 MMOL/L (ref 7–15)
BUN SERPL-MCNC: 19.7 MG/DL (ref 8–23)
CALCIUM SERPL-MCNC: 8.6 MG/DL (ref 8.8–10.2)
CHLORIDE SERPL-SCNC: 105 MMOL/L (ref 98–107)
CREAT SERPL-MCNC: 0.92 MG/DL (ref 0.67–1.17)
DEPRECATED HCO3 PLAS-SCNC: 23 MMOL/L (ref 22–29)
ERYTHROCYTE [DISTWIDTH] IN BLOOD BY AUTOMATED COUNT: 13.1 % (ref 10–15)
GFR SERPL CREATININE-BSD FRML MDRD: 87 ML/MIN/1.73M2
GLUCOSE SERPL-MCNC: 130 MG/DL (ref 70–99)
HCT VFR BLD AUTO: 44 % (ref 40–53)
HGB BLD-MCNC: 14.7 G/DL (ref 13.3–17.7)
MCH RBC QN AUTO: 28.7 PG (ref 26.5–33)
MCHC RBC AUTO-ENTMCNC: 33.4 G/DL (ref 31.5–36.5)
MCV RBC AUTO: 86 FL (ref 78–100)
PLATELET # BLD AUTO: 195 10E3/UL (ref 150–450)
POTASSIUM SERPL-SCNC: 4.2 MMOL/L (ref 3.4–5.3)
RBC # BLD AUTO: 5.12 10E6/UL (ref 4.4–5.9)
SODIUM SERPL-SCNC: 138 MMOL/L (ref 136–145)
WBC # BLD AUTO: 16.6 10E3/UL (ref 4–11)

## 2022-11-30 PROCEDURE — 82310 ASSAY OF CALCIUM: CPT | Performed by: UROLOGY

## 2022-11-30 PROCEDURE — 96372 THER/PROPH/DIAG INJ SC/IM: CPT | Mod: 59 | Performed by: UROLOGY

## 2022-11-30 PROCEDURE — 250N000013 HC RX MED GY IP 250 OP 250 PS 637: Performed by: UROLOGY

## 2022-11-30 PROCEDURE — 36415 COLL VENOUS BLD VENIPUNCTURE: CPT | Performed by: UROLOGY

## 2022-11-30 PROCEDURE — 250N000011 HC RX IP 250 OP 636: Performed by: UROLOGY

## 2022-11-30 PROCEDURE — 85027 COMPLETE CBC AUTOMATED: CPT | Performed by: UROLOGY

## 2022-11-30 RX ORDER — NALOXONE HYDROCHLORIDE 0.4 MG/ML
0.2 INJECTION, SOLUTION INTRAMUSCULAR; INTRAVENOUS; SUBCUTANEOUS
Status: DISCONTINUED | OUTPATIENT
Start: 2022-11-30 | End: 2022-11-30 | Stop reason: HOSPADM

## 2022-11-30 RX ORDER — NALOXONE HYDROCHLORIDE 0.4 MG/ML
0.4 INJECTION, SOLUTION INTRAMUSCULAR; INTRAVENOUS; SUBCUTANEOUS
Status: DISCONTINUED | OUTPATIENT
Start: 2022-11-30 | End: 2022-11-30 | Stop reason: HOSPADM

## 2022-11-30 RX ORDER — HYDROCODONE BITARTRATE AND ACETAMINOPHEN 5; 325 MG/1; MG/1
1-2 TABLET ORAL EVERY 4 HOURS PRN
Qty: 10 TABLET | Refills: 0 | Status: SHIPPED | OUTPATIENT
Start: 2022-11-30 | End: 2023-01-31

## 2022-11-30 RX ADMIN — ACETAMINOPHEN 650 MG: 325 TABLET ORAL at 11:08

## 2022-11-30 RX ADMIN — PANTOPRAZOLE SODIUM 40 MG: 40 TABLET, DELAYED RELEASE ORAL at 06:09

## 2022-11-30 RX ADMIN — ATORVASTATIN CALCIUM 10 MG: 10 TABLET, FILM COATED ORAL at 07:48

## 2022-11-30 RX ADMIN — HEPARIN SODIUM 5000 UNITS: 5000 INJECTION, SOLUTION INTRAVENOUS; SUBCUTANEOUS at 08:06

## 2022-11-30 ASSESSMENT — ACTIVITIES OF DAILY LIVING (ADL)
ADLS_ACUITY_SCORE: 20
ADLS_ACUITY_SCORE: 19
ADLS_ACUITY_SCORE: 19
ADLS_ACUITY_SCORE: 20
ADLS_ACUITY_SCORE: 19

## 2022-11-30 NOTE — PROGRESS NOTES
Discharge instructions reviewed with patient, including wood catheter care education. Patient verbalizes understanding.  Awaiting family arrival to transport home.    Dang Almodovar RN

## 2022-11-30 NOTE — PLAN OF CARE
Goal Outcome Evaluation:      Problem: Plan of Care - These are the overarching goals to be used throughout the patient stay.    Goal: Optimal Comfort and Wellbeing  11/29/2022 1848 by Sandra Israel RN  Outcome: Progressing  11/29/2022 1848 by Sandra Israel RN  Outcome: Progressing  Intervention: Monitor Pain and Promote Comfort  Recent Flowsheet Documentation  Taken 11/29/2022 1718 by Sandra Israel RN  Pain Management Interventions:   medication (see MAR)   pillow support provided   repositioned   rest     Problem: Plan of Care - These are the overarching goals to be used throughout the patient stay.    Goal: Optimal Comfort and Wellbeing  Intervention: Monitor Pain and Promote Comfort  Recent Flowsheet Documentation  Taken 11/29/2022 1718 by Sandra Israel RN  Pain Management Interventions:   medication (see MAR)   pillow support provided   repositioned   rest     Patient arrived to the unit from the PACU.  VSS.  On room air. Alert and oriented x 4.  CBI running and urine light pink.  Tylenol for discomfort to penis and was effective.  IVF per orders.  Will continue to monitor.  Good appetite.  Ate clear liquid diet and advanced to regular diet.

## 2022-11-30 NOTE — DISCHARGE SUMMARY
[facility] DISCHARGE SUMMARY    Name: Rich Musa  : 1945  MRN: [id number]  PCP: Antonio Hawley    Place of service: [facility]    Admission date: 2022   Discharge date: 2022     Surgeon: Dr Shaan Grewal     Surgical Procedure: Cystoscopy, bipolar transurethral resection of prostate    Date of surgery: 2022    Principal Diagnosis at Discharge:   Benign prostatic hyperplasia with lower urinary tract symptoms, symptom details unspecified [N40.1]  Hypertrophy of prostate with urinary obstruction [N40.1, N13.8]     Other diagnosis addressed during hospitalization:  None    Consults:  None    Diagnostic Studies :  None    Complications while in the Hospital:  None    Physical Exam:  Temp: 97.4  F (36.3  C) Temp src: Oral BP: 124/64 Pulse: 77   Resp: 16 SpO2: 92 % O2 Device: None (Room air) Oxygen Delivery: 2 LPM    Gen: nad, alert  Neuro: A&O x 3. Moving all extremities equally.   Resp: Reg resp rate/depth.   Abdomen: Soft, non-tender, non-distended.   : penis and scrotum without erythema or edema. Wood draining clear paolo urine.   LE: CWMS intact.    Brief history of hospital course:  This is a 77 year old male admitted for Benign prostatic hyperplasia with lower urinary tract symptoms, symptom details unspecified [N40.1]  Hypertrophy of prostate with urinary obstruction [N40.1, N13.8]. Patient underwent above procedure. Patient tolerated the procedure well. Post operative course was unremarkable with exception of WBC 16.6 on 11 AM. Pt afebrile. WBC likely just reactive to surgery. By the day of discharge, the patient was ambulatory, tolerating diet, pain was controlled with oral analgesics, labs and vitals stable, passing flatus. Discharge home with wood catheter.     Labs:  Hemoglobin   Date Value Ref Range Status   2022 14.7 13.3 - 17.7 g/dL Final     Platelet Count   Date Value Ref Range Status   2022 195 150 - 450 10e3/uL Final     WBC Count   Date  Value Ref Range Status   11/30/2022 16.6 (H) 4.0 - 11.0 10e3/uL Final     Creatinine   Date Value Ref Range Status   11/30/2022 0.92 0.67 - 1.17 mg/dL Final     Potassium   Date Value Ref Range Status   11/30/2022 4.2 3.4 - 5.3 mmol/L Final   04/11/2022 4.7 3.5 - 5.0 mmol/L Final     No results found for: INR     Pending Labs:  Surgical Pathology     DISPOSITION: Home    DISCHARGE CONDITION: Good/Stable    DISCHARGE MEDICATIONS:      Review of your medicines      START taking      Dose / Directions   HYDROcodone-acetaminophen 5-325 MG tablet  Commonly known as: NORCO  Used for: Benign prostatic hyperplasia with urinary hesitancy      Dose: 1-2 tablet  Take 1-2 tablets by mouth every 4 hours as needed for moderate to severe pain  Quantity: 10 tablet  Refills: 0        CONTINUE these medicines which have NOT CHANGED      Dose / Directions   acetaminophen 500 MG tablet  Commonly known as: TYLENOL      Dose: 1,000 mg  Take 1,000 mg by mouth every 6 hours as needed for mild pain  Refills: 0     atorvastatin 10 MG tablet  Commonly known as: LIPITOR  Used for: Dyslipidemia      Dose: 10 mg  Take 1 tablet (10 mg) by mouth daily  Quantity: 90 tablet  Refills: 3     CALCIUM CITRATE +D PO      Dose: 1 tablet  Take 1 tablet by mouth 2 times daily  Refills: 0     MULTIVITAMIN ADULT PO      Dose: 1 tablet  Take 1 tablet by mouth daily  Refills: 0     naproxen 500 MG tablet  Commonly known as: NAPROSYN      Dose: 500 mg  Take 500 mg by mouth 2 times daily as needed  Refills: 0     omeprazole 20 MG DR capsule  Commonly known as: priLOSEC      Dose: 20 mg  Take 20 mg by mouth daily  Refills: 0     tamsulosin 0.4 MG capsule  Commonly known as: FLOMAX      Dose: 0.4 mg  Take 0.4 mg by mouth At Bedtime  Refills: 0           Where to get your medicines      These medications were sent to [facility] [address] [place] [id number]    Phone: [phone]      HYDROcodone-acetaminophen 5-325 MG tablet         DISCHARGE PLAN:   - Follow up with Dr Shaan Grewal as scheduled prior to your procedure   - follow up with Antonio Hawley as directed   - Take medication as prescribed, avoid anticoagulants per surgeon and PCP recommendations.   - Wound / drain care: As directed prior to discharge  - Physical activity: As tolerated, no heavy lifting. No driving while taking narcotics.   - Diet: Clear liquid diet, advance to full liquid diet when passing gas and are free of nausea. You may advance to regular diet when you are passing gas regularly and as long as you remain without nausea.   - Medication: please review discharge Med req/AVS  - Warning signs discussed with patient about when to call the clinic/hospital  - All questions and concerns were answered for the patient prior to discharge  - Patient verbalized understanding.     Discussed patient with Dr Shaan Grewal on day of discharge.     BRENNA Mcnamara, CNP  [facility]   [phone]     I saw the patient on the date of discharge  Total time spent for discharge on date of discharge: 20 minutes    Physician(s) in addition to primary physician who should receive a copy:  CC1: Antonio Hawley           ?

## 2022-11-30 NOTE — PLAN OF CARE
Problem: Plan of Care - These are the overarching goals to be used throughout the patient stay.    Goal: Plan of Care Review  Description: The Plan of Care Review/Shift note should be completed every shift.  The Outcome Evaluation is a brief statement about your assessment that the patient is improving, declining, or no change.  This information will be displayed automatically on your shift note.  Outcome: Progressing   Goal Outcome Evaluation:       CBI running and has an outflow of pink or clear. On continuous pulse ox. Prn tylenol given for pain.  Resting comfortably, slept well overnight.

## 2022-11-30 NOTE — PROGRESS NOTES
Patient requesting miralax be ordered as he takes this at home daily.  Very concerned about getting constipated or having to strain while healing from his cystoscopy.  MD paged with request.

## 2022-11-30 NOTE — PLAN OF CARE
Problem: Plan of Care - These are the overarching goals to be used throughout the patient stay.    Goal: Plan of Care Review  Description: The Plan of Care Review/Shift note should be completed every shift.  The Outcome Evaluation is a brief statement about your assessment that the patient is improving, declining, or no change.  This information will be displayed automatically on your shift note.  Outcome: Progressing  Problem: Surgery Nonspecified  Goal: Effective Urinary Elimination  Outcome: Progressing     Patient alert & oriented, engaged in plan of care.  Hopeful to discharge today. CBI rate was increased only briefly as blood was observed in wood tubing (approximately 0830). Since that time, rate has been titrated down slowly. Spoke with Dank with Urology at 1300 regarding patient progress. Per Dank, CBI stopped at 1300. Will monitor output, Urology to see on unit later today.    Dang Almodovar RN

## 2022-11-30 NOTE — PROGRESS NOTES
PROGRESS NOTE    SUBJECTIVE:  No acute events overnight.  No F/C, N/V, CP/SOB. Has tolerated regular diet.  Pain at tip of penis with movement.    OBJECTIVE:  Temp (24hrs), Av.7  F (36.5  C), Min:97.1  F (36.2  C), Max:98.9  F (37.2  C)      Patient Vitals for the past 24 hrs:   BP Temp Temp src Pulse Resp SpO2 Weight   22 0029 105/54 98.1  F (36.7  C) Oral 79 18 93 % --   22 1957 119/64 97.7  F (36.5  C) Oral 76 18 92 % --   22 1853 135/74 97.5  F (36.4  C) Oral 76 16 94 % --   22 1751 (!) 144/69 97.5  F (36.4  C) Axillary 70 16 93 % --   22 1722 134/82 97.4  F (36.3  C) Oral 68 18 93 % --   22 1718 -- -- -- -- -- -- 93.4 kg (206 lb)   22 1600 134/75 -- -- 66 -- 95 % --   22 1545 133/73 -- -- 65 -- 92 % --   22 1530 139/74 -- -- 70 17 94 % --   22 1515 (!) 143/84 -- -- 65 18 96 % --   22 1500 (!) 141/78 -- -- 66 13 97 % --   22 1445 (!) 155/69 -- -- 67 (!) 9 95 % --   22 1435 (!) 160/77 97.1  F (36.2  C) Temporal 74 (!) 8 95 % --   22 1050 -- -- -- 70 -- -- --   22 1049 (!) 144/78 98.9  F (37.2  C) Oral 69 18 96 % --   22 1037 -- -- -- -- -- -- 94.3 kg (207 lb 12.8 oz)       Intake/Output Summary (Last 24 hours) at 2022 0718  Last data filed at 2022 0614  Gross per 24 hour   Intake 2115 ml   Output 47659 ml   Net -69151 ml       PHYSICAL EXAM:  Unlabored breathing  SNTND  3-way wood catheter in place with CBI running moderate drip and crystal urine      WBC Count   Date Value Ref Range Status   2022 16.6 (H) 4.0 - 11.0 10e3/uL Final   2022 5.8 4.0 - 11.0 10e3/uL Final   2021 6.4 4.0 - 11.0 10e3/uL Final     Hemoglobin   Date Value Ref Range Status   2022 14.7 13.3 - 17.7 g/dL Final   2022 14.4 13.3 - 17.7 g/dL Final   2022 14.7 13.3 - 17.7 g/dL Final     Platelet Count   Date Value Ref Range Status   2022 195 150 - 450 10e3/uL Final   2022 169 150 - 450  10e3/uL Final   12/16/2021 186 150 - 450 10e3/uL Final     Recent Labs   Lab Test 11/01/22  1223 04/11/22  1210 12/16/21  1537    143 140   POTASSIUM 4.0 4.7 4.4   CHLORIDE 107 106 104   CO2 24 26 26   BUN 20.0 17 17   CR 0.94 0.81 0.93   ANIONGAP 11 11 10   LATISHA 9.3 9.3 9.2   GLC 97 93 87           Principal Problem:    Benign prostatic hyperplasia with urinary hesitancy        ASSESSMENT & PLAN: POD #1 bipolar TURP. Doing well. Turned down CBI to a very slow drip around 715 am.  He's already saline locked.  Continue diet. Follow up labs (pending).  Will need to check CBI in a couple hours to determine discharge but hopefully he can be discharged home today with catheter.  Has voiding trial on 12/1/22.    Shaan Grewal MD

## 2022-12-02 LAB
PATH REPORT.COMMENTS IMP SPEC: NORMAL
PATH REPORT.FINAL DX SPEC: NORMAL
PATH REPORT.GROSS SPEC: NORMAL
PATH REPORT.MICROSCOPIC SPEC OTHER STN: NORMAL
PATH REPORT.RELEVANT HX SPEC: NORMAL
PHOTO IMAGE: NORMAL

## 2022-12-02 PROCEDURE — 88344 IMHCHEM/IMCYTCHM EA MLT ANTB: CPT | Mod: 26 | Performed by: PATHOLOGY

## 2022-12-02 PROCEDURE — 88305 TISSUE EXAM BY PATHOLOGIST: CPT | Mod: 26 | Performed by: PATHOLOGY

## 2022-12-27 NOTE — ADDENDUM NOTE
Addendum  created 12/27/22 1147 by Millie White MD    Attestation recorded in Intraprocedure, Intraprocedure Attestations filed

## 2023-01-19 ENCOUNTER — TRANSFERRED RECORDS (OUTPATIENT)
Dept: HEALTH INFORMATION MANAGEMENT | Facility: CLINIC | Age: 78
End: 2023-01-19

## 2023-01-28 ASSESSMENT — ENCOUNTER SYMPTOMS
CONSTIPATION: 0
PALPITATIONS: 0
HEARTBURN: 0
JOINT SWELLING: 0
FREQUENCY: 1
HEMATOCHEZIA: 0
COUGH: 0
NAUSEA: 0
DYSURIA: 0
FEVER: 0
SORE THROAT: 0
PARESTHESIAS: 0
HEMATURIA: 0
SHORTNESS OF BREATH: 0
ARTHRALGIAS: 1
MYALGIAS: 1
DIARRHEA: 0
DIZZINESS: 0
ABDOMINAL PAIN: 0
EYE PAIN: 0
NERVOUS/ANXIOUS: 0
CHILLS: 0
HEADACHES: 0
WEAKNESS: 0

## 2023-01-28 ASSESSMENT — ACTIVITIES OF DAILY LIVING (ADL): CURRENT_FUNCTION: NO ASSISTANCE NEEDED

## 2023-01-31 ENCOUNTER — OFFICE VISIT (OUTPATIENT)
Dept: INTERNAL MEDICINE | Facility: CLINIC | Age: 78
End: 2023-01-31
Payer: COMMERCIAL

## 2023-01-31 VITALS
TEMPERATURE: 97.9 F | OXYGEN SATURATION: 97 % | SYSTOLIC BLOOD PRESSURE: 132 MMHG | WEIGHT: 210 LBS | HEART RATE: 65 BPM | DIASTOLIC BLOOD PRESSURE: 80 MMHG | BODY MASS INDEX: 30.06 KG/M2 | HEIGHT: 70 IN

## 2023-01-31 DIAGNOSIS — Z90.79 S/P TURP: ICD-10-CM

## 2023-01-31 DIAGNOSIS — K21.9 GERD WITH STRICTURE: ICD-10-CM

## 2023-01-31 DIAGNOSIS — N30.00 ACUTE CYSTITIS WITHOUT HEMATURIA: ICD-10-CM

## 2023-01-31 DIAGNOSIS — G62.9 PERIPHERAL POLYNEUROPATHY: ICD-10-CM

## 2023-01-31 DIAGNOSIS — Z00.00 ENCOUNTER FOR MEDICARE ANNUAL WELLNESS EXAM: Primary | ICD-10-CM

## 2023-01-31 DIAGNOSIS — G47.33 OSA ON CPAP: ICD-10-CM

## 2023-01-31 DIAGNOSIS — K22.2 GERD WITH STRICTURE: ICD-10-CM

## 2023-01-31 DIAGNOSIS — E78.5 DYSLIPIDEMIA: ICD-10-CM

## 2023-01-31 DIAGNOSIS — H61.21 IMPACTED CERUMEN OF RIGHT EAR: ICD-10-CM

## 2023-01-31 PROBLEM — M72.2 PLANTAR FASCIITIS: Status: RESOLVED | Noted: 2020-11-10 | Resolved: 2023-01-31

## 2023-01-31 LAB
ALBUMIN SERPL BCG-MCNC: 4.6 G/DL (ref 3.5–5.2)
ALBUMIN UR-MCNC: ABNORMAL MG/DL
ALP SERPL-CCNC: 52 U/L (ref 40–129)
ALT SERPL W P-5'-P-CCNC: 17 U/L (ref 10–50)
ANION GAP SERPL CALCULATED.3IONS-SCNC: 9 MMOL/L (ref 7–15)
APPEARANCE UR: CLEAR
AST SERPL W P-5'-P-CCNC: 33 U/L (ref 10–50)
BACTERIA #/AREA URNS HPF: ABNORMAL /HPF
BILIRUB SERPL-MCNC: 0.9 MG/DL
BILIRUB UR QL STRIP: NEGATIVE
BUN SERPL-MCNC: 17.3 MG/DL (ref 8–23)
CALCIUM SERPL-MCNC: 9 MG/DL (ref 8.8–10.2)
CHLORIDE SERPL-SCNC: 104 MMOL/L (ref 98–107)
CHOLEST SERPL-MCNC: 150 MG/DL
COLOR UR AUTO: YELLOW
CREAT SERPL-MCNC: 0.96 MG/DL (ref 0.67–1.17)
DEPRECATED HCO3 PLAS-SCNC: 27 MMOL/L (ref 22–29)
ERYTHROCYTE [DISTWIDTH] IN BLOOD BY AUTOMATED COUNT: 13 % (ref 10–15)
GFR SERPL CREATININE-BSD FRML MDRD: 82 ML/MIN/1.73M2
GLUCOSE SERPL-MCNC: 101 MG/DL (ref 70–99)
GLUCOSE UR STRIP-MCNC: NEGATIVE MG/DL
HCT VFR BLD AUTO: 45.3 % (ref 40–53)
HDLC SERPL-MCNC: 40 MG/DL
HGB BLD-MCNC: 15 G/DL (ref 13.3–17.7)
HGB UR QL STRIP: ABNORMAL
KETONES UR STRIP-MCNC: NEGATIVE MG/DL
LDLC SERPL CALC-MCNC: 81 MG/DL
LEUKOCYTE ESTERASE UR QL STRIP: ABNORMAL
MCH RBC QN AUTO: 28.1 PG (ref 26.5–33)
MCHC RBC AUTO-ENTMCNC: 33.1 G/DL (ref 31.5–36.5)
MCV RBC AUTO: 85 FL (ref 78–100)
MUCOUS THREADS #/AREA URNS LPF: PRESENT /LPF
NITRATE UR QL: NEGATIVE
NONHDLC SERPL-MCNC: 110 MG/DL
PH UR STRIP: 7.5 [PH] (ref 5–8)
PLATELET # BLD AUTO: 173 10E3/UL (ref 150–450)
POTASSIUM SERPL-SCNC: 4.7 MMOL/L (ref 3.4–5.3)
PROT SERPL-MCNC: 7.2 G/DL (ref 6.4–8.3)
RBC # BLD AUTO: 5.33 10E6/UL (ref 4.4–5.9)
RBC #/AREA URNS AUTO: ABNORMAL /HPF
SODIUM SERPL-SCNC: 140 MMOL/L (ref 136–145)
SP GR UR STRIP: 1.01 (ref 1–1.03)
SQUAMOUS #/AREA URNS AUTO: ABNORMAL /LPF
TRIGL SERPL-MCNC: 146 MG/DL
UROBILINOGEN UR STRIP-ACNC: 0.2 E.U./DL
WBC # BLD AUTO: 5.3 10E3/UL (ref 4–11)
WBC #/AREA URNS AUTO: ABNORMAL /HPF
WBC CLUMPS #/AREA URNS HPF: PRESENT /HPF

## 2023-01-31 PROCEDURE — 87086 URINE CULTURE/COLONY COUNT: CPT | Performed by: INTERNAL MEDICINE

## 2023-01-31 PROCEDURE — 80061 LIPID PANEL: CPT | Performed by: INTERNAL MEDICINE

## 2023-01-31 PROCEDURE — 36415 COLL VENOUS BLD VENIPUNCTURE: CPT | Performed by: INTERNAL MEDICINE

## 2023-01-31 PROCEDURE — 99397 PER PM REEVAL EST PAT 65+ YR: CPT | Performed by: INTERNAL MEDICINE

## 2023-01-31 PROCEDURE — 85027 COMPLETE CBC AUTOMATED: CPT | Performed by: INTERNAL MEDICINE

## 2023-01-31 PROCEDURE — 80053 COMPREHEN METABOLIC PANEL: CPT | Performed by: INTERNAL MEDICINE

## 2023-01-31 PROCEDURE — 99213 OFFICE O/P EST LOW 20 MIN: CPT | Mod: 25 | Performed by: INTERNAL MEDICINE

## 2023-01-31 PROCEDURE — 81001 URINALYSIS AUTO W/SCOPE: CPT | Performed by: INTERNAL MEDICINE

## 2023-01-31 RX ORDER — CEFPODOXIME PROXETIL 200 MG/1
200 TABLET, FILM COATED ORAL 2 TIMES DAILY
Qty: 14 TABLET | Refills: 0 | Status: SHIPPED | OUTPATIENT
Start: 2023-01-31 | End: 2023-02-07

## 2023-01-31 ASSESSMENT — ENCOUNTER SYMPTOMS
JOINT SWELLING: 0
DIZZINESS: 0
NERVOUS/ANXIOUS: 0
PALPITATIONS: 0
NAUSEA: 0
SHORTNESS OF BREATH: 0
HEARTBURN: 0
FEVER: 0
HEADACHES: 0
CHILLS: 0
COUGH: 0
MYALGIAS: 1
HEMATURIA: 0
FREQUENCY: 1
DIARRHEA: 0
CONSTIPATION: 0
HEMATOCHEZIA: 0
EYE PAIN: 0
ABDOMINAL PAIN: 0
ARTHRALGIAS: 1
PARESTHESIAS: 0
DYSURIA: 0
SORE THROAT: 0
WEAKNESS: 0

## 2023-01-31 ASSESSMENT — ACTIVITIES OF DAILY LIVING (ADL): CURRENT_FUNCTION: NO ASSISTANCE NEEDED

## 2023-01-31 NOTE — PROGRESS NOTES
"SUBJECTIVE:   Khadar is a 77 year old who presents for Preventive Visit.    Khadar comes in today for his annual wellness.  He reports that the TURP went well.  He does have some frequency but notes his stream is quite good at this point.  He has nocturia x1.  He is off of tamsulosin.  If he gets to the bathroom quickly he has normal continence but there is some urgency at times.  Bowels have been good.  Mood has been good.  He thinks his ear is full of wax.  Patient has been advised of split billing requirements and indicates understanding: Yes  Are you in the first 12 months of your Medicare coverage?  No    Healthy Habits:     In general, how would you rate your overall health?  Excellent    Frequency of exercise:  4-5 days/week    Duration of exercise:  45-60 minutes    Do you usually eat at least 4 servings of fruit and vegetables a day, include whole grains    & fiber and avoid regularly eating high fat or \"junk\" foods?  No    Taking medications regularly:  Yes    Ability to successfully perform activities of daily living:  No assistance needed    Home Safety:  Lack of grab bars in the bathroom    Hearing Impairment:  Difficulty following a conversation in a noisy restaurant or crowded room, feel that people are mumbling or not speaking clearly, difficulty following dialogue in the theater, difficult to understand a speaker at a public meeting or Mosque service, need to ask people to speak up or repeat themselves and difficulty understanding soft or whispered speech    In the past 6 months, have you been bothered by leaking of urine? Yes    In general, how would you rate your overall mental or emotional health?  Excellent      PHQ-2 Total Score: 0    Additional concerns today:  No      Have you ever done Advance Care Planning? (For example, a Health Directive, POLST, or a discussion with a medical provider or your loved ones about your wishes): Yes, advance care planning is on file.       Fall risk  Fallen 2 or " more times in the past year?: No  Any fall with injury in the past year?: No    Cognitive Screening   1) Repeat 3 items (Leader, Season, Table)    2) Clock draw: NORMAL  3) 3 item recall: Recalls 3 objects  Results: Normal Clock    Mini-CogTM Copyright NIKOLAI Bell. Licensed by the author for use in [facility]; reprinted with permission (brittney@[facility]). All rights reserved.      Do you have sleep apnea, excessive snoring or daytime drowsiness?: yes    Reviewed and updated as needed this visit by clinical staff   Tobacco  Allergies  Meds              Reviewed and updated as needed this visit by Provider                 Social History     Tobacco Use     Smoking status: Never     Smokeless tobacco: Never   Substance Use Topics     Alcohol use: No     Comment: Alcoholic Drinks/day: social 3 times per month         Alcohol Use 1/28/2023   Prescreen: >3 drinks/day or >7 drinks/week? No   Prescreen: >3 drinks/day or >7 drinks/week? -               Current providers sharing in care for this patient include:   Patient Care Team:  Antonio Hawley MD as PCP - General (Internal Medicine)  David Mathews DO as Assigned Sleep Provider  Black Carlos MD as MD  Antonio Hawley MD as Assigned PCP    The following health maintenance items are reviewed in Epic and correct as of today:  Health Maintenance   Topic Date Due     MEDICARE ANNUAL WELLNESS VISIT  12/16/2022     ANNUAL REVIEW OF HM ORDERS  12/16/2022     FALL RISK ASSESSMENT  01/31/2024     LIPID  12/16/2026     ADVANCE CARE PLANNING  01/04/2027     DTAP/TDAP/TD IMMUNIZATION (3 - Td or Tdap) 11/06/2029     COLORECTAL CANCER SCREENING  06/09/2030     HEPATITIS C SCREENING  Completed     PHQ-2 (once per calendar year)  Completed     INFLUENZA VACCINE  Completed     Pneumococcal Vaccine: 65+ Years  Completed     ZOSTER IMMUNIZATION  Completed     AORTIC ANEURYSM SCREENING (SYSTEM ASSIGNED)  Completed     COVID-19 Vaccine  Completed     IPV IMMUNIZATION  Aged  "Out     MENINGITIS IMMUNIZATION  Aged Out               Review of Systems   Constitutional: Negative for chills and fever.   HENT: Positive for hearing loss. Negative for congestion, ear pain and sore throat.    Eyes: Negative for pain and visual disturbance.   Respiratory: Negative for cough and shortness of breath.    Cardiovascular: Negative for chest pain, palpitations and peripheral edema.   Gastrointestinal: Negative for abdominal pain, constipation, diarrhea, heartburn, hematochezia and nausea.   Genitourinary: Positive for frequency and urgency. Negative for dysuria, genital sores, hematuria, impotence and penile discharge.   Musculoskeletal: Positive for arthralgias and myalgias. Negative for joint swelling.   Skin: Negative for rash.   Neurological: Negative for dizziness, weakness, headaches and paresthesias.   Psychiatric/Behavioral: Negative for mood changes. The patient is not nervous/anxious.          OBJECTIVE:   /80 (BP Location: Left arm, Patient Position: Sitting, Cuff Size: Adult Regular)   Pulse 65   Temp 97.9  F (36.6  C)   Ht 1.765 m (5' 9.5\")   Wt 95.3 kg (210 lb)   SpO2 97%   BMI 30.57 kg/m   Estimated body mass index is 30.57 kg/m  as calculated from the following:    Height as of this encounter: 1.765 m (5' 9.5\").    Weight as of this encounter: 95.3 kg (210 lb).  Physical Exam      Pleasant gentleman who looks well.  HEENT reveals hair and cerumen impaction on the right auditory canal.  Left is clean.  Neck supple.  Lungs clear.  Heart regular.  Extremities without edema.  He has normal sensation to light touch throughout in his feet but he does note his first and second toes are numb bilaterally    ASSESSMENT / PLAN:   1. Encounter for Medicare annual wellness exam  Patient is up-to-date on his health maintenance.  He lives an active and healthy lifestyle.  Continue same and I will see him back in a year    2. S/P TURP  He is doing well after his TURP.  Check urinalysis to " "ensure no infection after the catheter was then.  - UA Macro with Reflex to Micro and Culture - lab collect; Future  - CBC with platelets; Future  - UA Macro with Reflex to Micro and Culture - lab collect  - CBC with platelets    3. Dyslipidemia  Lipids for monitoring today.  - Comprehensive metabolic panel (BMP + Alb, Alk Phos, ALT, AST, Total. Bili, TP); Future  - Lipid panel reflex to direct LDL Fasting; Future  - Comprehensive metabolic panel (BMP + Alb, Alk Phos, ALT, AST, Total. Bili, TP)  - Lipid panel reflex to direct LDL Fasting    4. GERD with stricture  No issues with swallowing at this point.  Continue PPI indefinitely.  - CBC with platelets; Future  - CBC with platelets    5. BRYN on CPAP  He is very compliant with CPAP.  Continue same  - CBC with platelets; Future  - CBC with platelets    6. Peripheral polyneuropathy  Mild neuropathy which is perhaps a little worse he tells me.  There is no pain And he has normal sensation to light touch.  Evaluation in the past has been unrevealing.  Possibly a idiopathic polyneuropathy.  As long as its mild and relatively unchanged I would not recommend anything further than just clinical watching and waiting    7. Impacted cerumen of right ear  Cerumen was removed today via irrigation.  Unable to completely cleared out due to large amount of hair in his ears.   refer to ENT for discussion on how to prevent issues going forward.  - Adult ENT  Referral; Future    Patient has been advised of split billing requirements and indicates understanding: Yes      COUNSELING:  Reviewed preventive health counseling, as reflected in patient instructions      BMI:   Estimated body mass index is 30.57 kg/m  as calculated from the following:    Height as of this encounter: 1.765 m (5' 9.5\").    Weight as of this encounter: 95.3 kg (210 lb).         He reports that he has never smoked. He has never used smokeless tobacco.      Appropriate preventive services were discussed " with this patient, including applicable screening as appropriate for cardiovascular disease, diabetes, osteopenia/osteoporosis, and glaucoma.  As appropriate for age/gender, discussed screening for colorectal cancer, prostate cancer, breast cancer, and cervical cancer. Checklist reviewing preventive services available has been given to the patient.    Reviewed patients plan of care and provided an AVS. The Basic Care Plan (routine screening as documented in Health Maintenance) for Rich meets the Care Plan requirement. This Care Plan has been established and reviewed with the Patient.          JULIANE DEJESUS MD  [facility]    Identified Health Risks:

## 2023-02-01 ENCOUNTER — TELEPHONE (OUTPATIENT)
Dept: INTERNAL MEDICINE | Facility: CLINIC | Age: 78
End: 2023-02-01
Payer: COMMERCIAL

## 2023-02-01 DIAGNOSIS — R82.90 ABNORMAL URINALYSIS: Primary | ICD-10-CM

## 2023-02-01 NOTE — TELEPHONE ENCOUNTER
----- Message from Antonio Hawley MD sent at 1/31/2023  6:44 PM CST -----  Team - please call patient with results.  Please schedule him a lab appt too.  Thanks.      Jose Rubalcava.  Not all of your labs are back yet but it does look like you have a urine infection so I want to get you on medicine.  Some of this could be just from you healing up and the surgery, but I am going to treat you for a week with an antibiotic.  That may help with some of your urgency as well.  Please schedule a follow-up urinalysis in a couple weeks to ensure clearing.

## 2023-02-01 NOTE — TELEPHONE ENCOUNTER
Patient called back, relayed messages. Patient scheduled for lab 2/15/2023. Patient will need UA order in.

## 2023-02-01 NOTE — TELEPHONE ENCOUNTER
Called Khadar to inform lab result.     Please pass on the message below and schedule a lab appt for UA.   Antibiotic was sent to [facility] pharmacy in [place].

## 2023-02-02 LAB — BACTERIA UR CULT: NO GROWTH

## 2023-02-03 ENCOUNTER — OFFICE VISIT (OUTPATIENT)
Dept: OTOLARYNGOLOGY | Facility: CLINIC | Age: 78
End: 2023-02-03
Payer: COMMERCIAL

## 2023-02-03 VITALS
DIASTOLIC BLOOD PRESSURE: 84 MMHG | RESPIRATION RATE: 16 BRPM | BODY MASS INDEX: 31.15 KG/M2 | OXYGEN SATURATION: 96 % | HEART RATE: 78 BPM | WEIGHT: 214 LBS | SYSTOLIC BLOOD PRESSURE: 134 MMHG

## 2023-02-03 DIAGNOSIS — H61.21 IMPACTED CERUMEN OF RIGHT EAR: ICD-10-CM

## 2023-02-03 PROCEDURE — 69210 REMOVE IMPACTED EAR WAX UNI: CPT | Performed by: OTOLARYNGOLOGY

## 2023-02-03 RX ORDER — FLUOCINOLONE ACETONIDE 0.11 MG/ML
5 OIL AURICULAR (OTIC) AT BEDTIME
Qty: 20 ML | Refills: 3 | Status: SHIPPED | OUTPATIENT
Start: 2023-02-03 | End: 2023-03-16

## 2023-02-03 ASSESSMENT — PAIN SCALES - GENERAL: PAINLEVEL: NO PAIN (0)

## 2023-02-03 NOTE — LETTER
2/3/2023         RE: Rich Musa  [address] [address]        Dear Colleague,    Thank you for referring your patient, Rich Musa, to the [facility]. Please see a copy of my visit note below.    History of Present Illness - Rich Musa is a 77 year old male here to see me for the first time due to an ear problem.    He is a long time hearing aid user and has a tendency to get cerumen build up.  But once he started using aids the cerumen got even worse.    Otherwise no history of chronic ear disease.  No previous ear surgery.  No history of chemo or radiation therapy to the head and neck, and no major head trauma.  He denies a history of  service, no frequent firearm use.  No previous history working in an industrial environment.      Past medical history -   Patient Active Problem List   Diagnosis     Dyslipidemia     GERD with stricture     BRYN on CPAP     Seborrheic Dermatitis     Seborrheic Keratosis     Nephrolithiasis     Benign prostatic hyperplasia with urinary hesitancy     S/P TURP     Peripheral polyneuropathy       /84   Pulse 78   Resp 16   Wt 97.1 kg (214 lb)   SpO2 96%   BMI 31.15 kg/m      General - The patient is well nourished and well developed, and appears to have good nutritional status.  Alert and oriented to person and place, answers questions and cooperates with examination appropriately.   Head and Face - Normocephalic and atraumatic, with no gross asymmetry noted of the contour of the facial features.  The facial nerve is intact, with strong symmetric movements.  Eyes - Extraocular movements intact, and the pupils were reactive to light.  Sclera were not icteric or injected, conjunctiva were pink and moist.  Mouth - Examination of the oral cavity shows pink, healthy, moist mucosa.  No lesions or ulceration noted.  The dentition are in good repair.  The tongue is mobile and midline.    Cerumen Removal    Physical  Exam and Procedure  Ears - On examination of the ears, I found that the RIGHT  had cerumen impaction.  Therefore, I positioned them in the examination chair in a semi-supine position, beginning with the right side.  I used the binocular surgical microscope to perform cerumen removal.  I began by using a cerumen loop to gently lift the edges of the cerumen mass away from the walls of the external canal.  Once I did this, I was able to suction away fragments of wax and debris using suction.  Once the mass was loose enough, the entire plug was pulled from the canal.  The tympanic membrane was intact, no sign of perforation or middle ear effusion.    I turned my attention to the contralateral side once again using the binocular surgical microscope to perform cerumen removal.  I began by using a cerumen loop to gently lift the edges of the cerumen mass away from the walls of the external canal.  Once I did this, I was able to suction away fragments of wax and debris using suction.  Once the mass was loose enough, the entire plug was pulled from the canal.  The tympanic membrane was intact, no sign of perforation or middle ear effusion.        A/P - Rich Musa  (H61.21) Impacted cerumen of right ear    The patient has had their cerumen procedurally removed today.  I have discussed ear care at home, including avoiding qtips or any other object placed in the canal.  I have also discussed that over the counter cerumen kits like Debrox or Cerumenex can be useful.    If no other issues, follow up with me in one year for an ear check.          Again, thank you for allowing me to participate in the care of your patient.        Sincerely,        Nando Velasquez MD

## 2023-02-03 NOTE — PROGRESS NOTES
History of Present Illness - Rich Musa is a 77 year old male here to see me for the first time due to an ear problem.    He is a long time hearing aid user and has a tendency to get cerumen build up.  But once he started using aids the cerumen got even worse.    Otherwise no history of chronic ear disease.  No previous ear surgery.  No history of chemo or radiation therapy to the head and neck, and no major head trauma.  He denies a history of  service, no frequent firearm use.  No previous history working in an industrial environment.      Past medical history -   Patient Active Problem List   Diagnosis     Dyslipidemia     GERD with stricture     BRYN on CPAP     Seborrheic Dermatitis     Seborrheic Keratosis     Nephrolithiasis     Benign prostatic hyperplasia with urinary hesitancy     S/P TURP     Peripheral polyneuropathy       /84   Pulse 78   Resp 16   Wt 97.1 kg (214 lb)   SpO2 96%   BMI 31.15 kg/m      General - The patient is well nourished and well developed, and appears to have good nutritional status.  Alert and oriented to person and place, answers questions and cooperates with examination appropriately.   Head and Face - Normocephalic and atraumatic, with no gross asymmetry noted of the contour of the facial features.  The facial nerve is intact, with strong symmetric movements.  Eyes - Extraocular movements intact, and the pupils were reactive to light.  Sclera were not icteric or injected, conjunctiva were pink and moist.  Mouth - Examination of the oral cavity shows pink, healthy, moist mucosa.  No lesions or ulceration noted.  The dentition are in good repair.  The tongue is mobile and midline.    Cerumen Removal    Physical Exam and Procedure  Ears - On examination of the ears, I found that the RIGHT  had cerumen impaction.  Therefore, I positioned them in the examination chair in a semi-supine position, beginning with the right side.  I used the binocular surgical  microscope to perform cerumen removal.  I began by using a cerumen loop to gently lift the edges of the cerumen mass away from the walls of the external canal.  Once I did this, I was able to suction away fragments of wax and debris using suction.  Once the mass was loose enough, the entire plug was pulled from the canal.  The tympanic membrane was intact, no sign of perforation or middle ear effusion.    I turned my attention to the contralateral side once again using the binocular surgical microscope to perform cerumen removal.  I began by using a cerumen loop to gently lift the edges of the cerumen mass away from the walls of the external canal.  Once I did this, I was able to suction away fragments of wax and debris using suction.  Once the mass was loose enough, the entire plug was pulled from the canal.  The tympanic membrane was intact, no sign of perforation or middle ear effusion.        A/P - Rich Musa  (H61.21) Impacted cerumen of right ear    The patient has had their cerumen procedurally removed today.  I have discussed ear care at home, including avoiding qtips or any other object placed in the canal.  I have also discussed that over the counter cerumen kits like Debrox or Cerumenex can be useful.    If no other issues, follow up with me in one year for an ear check.

## 2023-02-15 ENCOUNTER — LAB (OUTPATIENT)
Dept: LAB | Facility: CLINIC | Age: 78
End: 2023-02-15
Payer: COMMERCIAL

## 2023-02-15 DIAGNOSIS — R82.90 ABNORMAL URINALYSIS: ICD-10-CM

## 2023-02-15 LAB
ALBUMIN UR-MCNC: ABNORMAL MG/DL
APPEARANCE UR: CLEAR
BACTERIA #/AREA URNS HPF: ABNORMAL /HPF
BILIRUB UR QL STRIP: NEGATIVE
COLOR UR AUTO: YELLOW
GLUCOSE UR STRIP-MCNC: NEGATIVE MG/DL
HGB UR QL STRIP: ABNORMAL
KETONES UR STRIP-MCNC: NEGATIVE MG/DL
LEUKOCYTE ESTERASE UR QL STRIP: ABNORMAL
MUCOUS THREADS #/AREA URNS LPF: PRESENT /LPF
NITRATE UR QL: NEGATIVE
PH UR STRIP: 6 [PH] (ref 5–8)
RBC #/AREA URNS AUTO: ABNORMAL /HPF
SP GR UR STRIP: 1.02 (ref 1–1.03)
SQUAMOUS #/AREA URNS AUTO: ABNORMAL /LPF
UROBILINOGEN UR STRIP-ACNC: 0.2 E.U./DL
WBC #/AREA URNS AUTO: ABNORMAL /HPF

## 2023-02-15 PROCEDURE — 81001 URINALYSIS AUTO W/SCOPE: CPT

## 2023-02-17 DIAGNOSIS — E78.5 DYSLIPIDEMIA: ICD-10-CM

## 2023-02-18 RX ORDER — ATORVASTATIN CALCIUM 10 MG/1
10 TABLET, FILM COATED ORAL DAILY
Qty: 90 TABLET | Refills: 3 | Status: SHIPPED | OUTPATIENT
Start: 2023-02-18 | End: 2024-02-28

## 2023-02-19 NOTE — TELEPHONE ENCOUNTER
"Last Written Prescription Date:  2/1/22  Last Fill Quantity: 90,  # refills: 3   Last office visit provider:  1/31/23     Requested Prescriptions   Pending Prescriptions Disp Refills     atorvastatin (LIPITOR) 10 MG tablet [Pharmacy Med Name: Atorvastatin Calcium Oral Tablet 10 MG] 90 tablet 0     Sig: Take 1 tablet (10 mg) by mouth daily       Statins Protocol Passed - 2/17/2023  2:29 PM        Passed - LDL on file in past 12 months     Recent Labs   Lab Test 01/31/23  1017   LDL 81             Passed - No abnormal creatine kinase in past 12 months     Recent Labs   Lab Test 04/11/22  1210                   Passed - Recent (12 mo) or future (30 days) visit within the authorizing provider's specialty     Patient has had an office visit with the authorizing provider or a provider within the authorizing providers department within the previous 12 mos or has a future within next 30 days. See \"Patient Info\" tab in inbasket, or \"Choose Columns\" in Meds & Orders section of the refill encounter.              Passed - Medication is active on med list        Passed - Patient is age 18 or older             Lizzie Ayala, RN 02/18/23 7:25 PM  "

## 2023-02-24 DIAGNOSIS — G47.33 OBSTRUCTIVE SLEEP APNEA (ADULT) (PEDIATRIC): Primary | ICD-10-CM

## 2023-02-28 ENCOUNTER — TELEPHONE (OUTPATIENT)
Dept: SLEEP MEDICINE | Facility: CLINIC | Age: 78
End: 2023-02-28
Payer: COMMERCIAL

## 2023-02-28 DIAGNOSIS — G47.33 OBSTRUCTIVE SLEEP APNEA: Primary | ICD-10-CM

## 2023-02-28 NOTE — TELEPHONE ENCOUNTER
Last ov 11/5/21  Next ov 3/16/23    Patient requesting updated order for CPAP supplies prior to upcoming appointment. Order pended for provider consideration.    Vero IBARRA RN  [facility]

## 2023-02-28 NOTE — TELEPHONE ENCOUNTER
Order/Referral Request    Who is requesting: Patient, [facility]    Orders being requested: CPAP supplies    Reason service is needed/diagnosis: Sleep apnea    When are orders needed by: asap    Has this been discussed with Provider: No    Does patient have a preference on a Group/Provider/Facility?     Does patient have an appointment scheduled?: Yes: 3/16/23 at 8am    Where to send orders: Place orders within Epic    Could we send this information to you in Xapo or would you prefer to receive a phone call?:   Patient would like to be contacted via Xapo

## 2023-03-01 ENCOUNTER — TRANSFERRED RECORDS (OUTPATIENT)
Dept: HEALTH INFORMATION MANAGEMENT | Facility: CLINIC | Age: 78
End: 2023-03-01

## 2023-03-01 ENCOUNTER — MYC MEDICAL ADVICE (OUTPATIENT)
Dept: INTERNAL MEDICINE | Facility: CLINIC | Age: 78
End: 2023-03-01
Payer: COMMERCIAL

## 2023-03-02 ENCOUNTER — TELEPHONE (OUTPATIENT)
Dept: INTERNAL MEDICINE | Facility: CLINIC | Age: 78
End: 2023-03-02
Payer: COMMERCIAL

## 2023-03-02 NOTE — TELEPHONE ENCOUNTER
General Call    Contacts       Type Contact Phone/Fax    03/02/2023 10:14 AM CST Phone (Incoming) Khadar Musa (Self) [phone] (H)        Reason for Call:  Patient following up on UTI results    What are your questions or concerns:  Pt would like a call by a nurse or Dr. Hawley to discuss his results    Date of last appointment with provider: n/a     would you prefer to receive a phone call?:   Patient would prefer a phone call     Okay to leave a detailed message?: Yes at Home number on file [phone] ([place])

## 2023-03-15 ASSESSMENT — SLEEP AND FATIGUE QUESTIONNAIRES
HOW LIKELY ARE YOU TO NOD OFF OR FALL ASLEEP IN A CAR, WHILE STOPPED FOR A FEW MINUTES IN TRAFFIC: WOULD NEVER DOZE
HOW LIKELY ARE YOU TO NOD OFF OR FALL ASLEEP WHILE SITTING AND READING: SLIGHT CHANCE OF DOZING
HOW LIKELY ARE YOU TO NOD OFF OR FALL ASLEEP WHILE SITTING QUIETLY AFTER LUNCH WITHOUT ALCOHOL: WOULD NEVER DOZE
HOW LIKELY ARE YOU TO NOD OFF OR FALL ASLEEP WHEN YOU ARE A PASSENGER IN A CAR FOR AN HOUR WITHOUT A BREAK: WOULD NEVER DOZE
HOW LIKELY ARE YOU TO NOD OFF OR FALL ASLEEP WHILE WATCHING TV: HIGH CHANCE OF DOZING
HOW LIKELY ARE YOU TO NOD OFF OR FALL ASLEEP WHILE LYING DOWN TO REST IN THE AFTERNOON WHEN CIRCUMSTANCES PERMIT: MODERATE CHANCE OF DOZING
HOW LIKELY ARE YOU TO NOD OFF OR FALL ASLEEP WHILE SITTING AND TALKING TO SOMEONE: WOULD NEVER DOZE
HOW LIKELY ARE YOU TO NOD OFF OR FALL ASLEEP WHILE SITTING INACTIVE IN A PUBLIC PLACE: WOULD NEVER DOZE

## 2023-03-16 ENCOUNTER — OFFICE VISIT (OUTPATIENT)
Dept: SLEEP MEDICINE | Facility: CLINIC | Age: 78
End: 2023-03-16
Payer: COMMERCIAL

## 2023-03-16 VITALS
OXYGEN SATURATION: 95 % | WEIGHT: 217.38 LBS | SYSTOLIC BLOOD PRESSURE: 137 MMHG | DIASTOLIC BLOOD PRESSURE: 79 MMHG | BODY MASS INDEX: 31.64 KG/M2 | HEART RATE: 73 BPM

## 2023-03-16 DIAGNOSIS — G47.33 OBSTRUCTIVE SLEEP APNEA: Primary | ICD-10-CM

## 2023-03-16 PROCEDURE — 99214 OFFICE O/P EST MOD 30 MIN: CPT | Performed by: INTERNAL MEDICINE

## 2023-03-16 NOTE — PATIENT INSTRUCTIONS
Your Body mass index is 31.64 kg/m .  Weight management is a personal decision.  If you are interested in exploring weight loss strategies, the following discussion covers the approaches that may be successful. Body mass index (BMI) is one way to tell whether you are at a healthy weight, overweight, or obese. It measures your weight in relation to your height.  A BMI of 18.5 to 24.9 is in the healthy range. A person with a BMI of 25 to 29.9 is considered overweight, and someone with a BMI of 30 or greater is considered obese. More than two-thirds of American adults are considered overweight or obese.  Being overweight or obese increases the risk for further weight gain. Excess weight may lead to heart disease and diabetes.  Creating and following plans for healthy eating and physical activity may help you improve your health.  Weight control is part of healthy lifestyle and includes exercise, emotional health, and healthy eating habits. Careful eating habits lifelong are the mainstay of weight control. Though there are significant health benefits from weight loss, long-term weight loss with diet alone may be very difficult to achieve- studies show long-term success with dietary management in less than 10% of people. Attaining a healthy weight may be especially difficult to achieve in those with severe obesity. In some cases, medications, devices and surgical management might be considered.  What can you do?  If you are overweight or obese and are interested in methods for weight loss, you should discuss this with your provider.     Consider reducing daily calorie intake by 500 calories.     Keep a food journal.     Avoiding skipping meals, consider cutting portions instead.    Diet combined with exercise helps maintain muscle while optimizing fat loss. Strength training is particularly important for building and maintaining muscle mass. Exercise helps reduce stress, increase energy, and improves fitness.  Increasing exercise without diet control, however, may not burn enough calories to loose weight.       Start walking three days a week 10-20 minutes at a time    Work towards walking thirty minutes five days a week     Eventually, increase the speed of your walking for 1-2 minutes at time    In addition, we recommend that you review healthy lifestyles and methods for weight loss available through the National Institutes of Health patient information sites:  http://win.niddk.nih.gov/publications/index.htm    And look into health and wellness programs that may be available through your health insurance provider, employer, local community center, or [employer] club.

## 2023-03-16 NOTE — NURSING NOTE
Return in about 1 year reminder created and to be send via [facility].       Troy Robles JUANIS  [facility]

## 2023-03-16 NOTE — PROGRESS NOTES
Additional 10 minutes on the date of service was spent performing the following:    -Preparing to see the patient   -Ordering medications, tests, or procedures   -Documenting clinical information in the electronic or other health record     Thank you for the opportunity to participate in the care of Rich Musa.     He is a 78 year old y/o male patient who comes to the sleep medicine clinic for follow up.  The patient was diagnosed with BRYN on 02/02/2005 (AHI=23.5).  The patient states that he continues to do well on CPAP therapy and has no complaints.  He states that he still benefiting from therapy.  His only major complaint is that he would occasionally wake up at 2 or 3:00 in the morning and have difficulty falling back to sleep until an hour later.     Assessment and Plan:  In summary Rich Musa is a 78 year old year old male who is here for follow up.    1. Obstructive sleep apnea  I congratulated the patient on his excellent CPAP usage.  I will keep him on same pressure settings for now.  I also recommended that if he wanted to he can take a low-dose melatonin when he wakes up in the middle of the night and still has 4 hours of sleep left.  Return to clinic every year.  - COMPREHENSIVE DME    Compliance Download data for 30 Days:  Pressure setting:APAP 13.5-15 cwp  95% pressure: 15 cwp  Residual AHI:3.3 events per hour  Leak:Minimal  Compliance:100%  Mask Tolerance:Good  Skin irritation:None  DME:[facility]    Lab reviewed: Discussed with patient.    Do you use a CPAP Machine at home: Yes  Overall, on a scale of 0-10 how would you rate your CPAP (0 poor, 10 great): 10  Is your mask comfortable: Yes  If not, why:    Is you mask leaking: No  If yes, where do you feel it:    How many night per week does the mask leak (0-7):    Do you notice snoring with mask on: No  Do you notice gasping arousals with mask on: No  Are you having significant oral or nasal dryness: Yes  Is the pressure setting  comfortable: Yes  In not, why:    What type of mask do you use: Full Face Mask  What is your typical bedtime: 10:00 - 10:30  How long does it take you to go to sleep on PAP therapy: 10 - 15 Min.  What time do you typically get out of bed for the day: 4:00am  How many hours on average per night are you using PAP therapy: 5-6 Hours  How many hours are you sleeping per night: 5-6  Do you feel well rested in the morning: Yes      ASHU:  ASHU Total Score: 6  Total score - [place]: 6 (3/15/2023  3:27 PM)        Patient Active Problem List   Diagnosis     Dyslipidemia     GERD with stricture     BRYN on CPAP     Seborrheic Dermatitis     Seborrheic Keratosis     Nephrolithiasis     Benign prostatic hyperplasia with urinary hesitancy     S/P TURP     Peripheral polyneuropathy       Past Medical History:   Diagnosis Date     Benign prostatic hyperplasia with urinary hesitancy      BPH (benign prostatic hyperplasia)      Dyslipidemia      GERD with stricture      Nephrolithiasis      BRYN (obstructive sleep apnea)     cpap     Plantar fasciitis      Seborrheic dermatitis      Seborrheic keratosis      Trochanteric bursitis        Past Surgical History:   Procedure Laterality Date     CYSTOSCOPY, TRANSURETHRAL RESECTION (TUR) PROSTATE, COMBINED N/A 11/29/2022    Procedure: CYSTOSCOPY,  BIPOLAR TRANSURETHRAL RESECTION OF PROSTATE;  Surgeon: Shaan Grewal MD;  [place] [facility] [place]      KNEE SCOPE, DIAGNOSTIC      Description: Arthroscopy Knee Right;  Recorded: 12/19/2011;       Current Outpatient Medications   Medication Sig Dispense Refill     acetaminophen (TYLENOL) 500 MG tablet Take 1,000 mg by mouth every 6 hours as needed for mild pain       atorvastatin (LIPITOR) 10 MG tablet Take 1 tablet (10 mg) by mouth daily 90 tablet 3     Calcium Citrate-Vitamin D (CALCIUM CITRATE +D PO) Take 1 tablet by mouth 2 times daily       Multiple Vitamins-Minerals (MULTIVITAMIN ADULT PO) Take 1 tablet by mouth daily        naproxen (NAPROSYN) 500 MG tablet Take 500 mg by mouth 2 times daily as needed       omeprazole (PRILOSEC) 20 MG DR capsule Take 20 mg by mouth daily       fluocinolone acetonide oil 0.01 % ear drops Place 0.25 mLs (5 drops) into both ears At Bedtime 20 mL 3       Allergies   Allergen Reactions     Sulfa Drugs Rash       Physical Exam:  /79   Pulse 73   Wt 98.6 kg (217 lb 6 oz)   SpO2 95%   BMI 31.64 kg/m    BMI:Body mass index is 31.64 kg/m .   GEN: NAD,   Head: Normocephalic.  EYES: EOMI  Psych: normal mood, normal affect    Labs/Studies:      No results found for: PH, PHARTERIAL, PO2, [id number], SAT, PCO2, HCO3, BASEEXCESS, DENISE, BEB  Lab Results   Component Value Date    TSH 3.24 04/11/2022    TSH 2.49 06/28/2019     Lab Results   Component Value Date     (H) 01/31/2023     (H) 11/30/2022     Lab Results   Component Value Date    HGB 15.0 01/31/2023    HGB 14.7 11/30/2022     Lab Results   Component Value Date    BUN 17.3 01/31/2023    BUN 19.7 11/30/2022    CR 0.96 01/31/2023    CR 0.92 11/30/2022     Lab Results   Component Value Date    AST 33 01/31/2023    AST 29 04/11/2022    ALT 17 01/31/2023    ALT 16 04/11/2022    ALKPHOS 52 01/31/2023    ALKPHOS 53 04/11/2022    BILITOTAL 0.9 01/31/2023    BILITOTAL 1.0 04/11/2022     No results found for: UAMP, UBARB, BENZODIAZEUR, UCANN, UCOC, OPIT, UPCP    Recent Labs   Lab Test 01/31/23  1017 11/30/22  0641    138   POTASSIUM 4.7 4.2   CHLORIDE 104 105   CO2 27 23   ANIONGAP 9 10   * 130*   BUN 17.3 19.7   CR 0.96 0.92   LATISHA 9.0 8.6*       No results found for: [place]    I reviewed the efficacy and compliance report from his device. Data summarized on the HPI and the PAP compliance flow sheet.     Patient verbalized understanding of these issues, agrees with the plan and all questions were answered today. Patient was given an opportuntity to voice any other symptoms or concerns not listed above. Patient did not have any other  symptoms or concerns.      David Mathews DO  Board Certified in Internal Medicine and Sleep Medicine    (Note created with Dragon voice recognition and unintended spelling errors and word substitutions may occur)     Audio and visual devices were used for this virtual clinic visit with permission from patient.    CC:  Antonio Hawley MD

## 2023-03-16 NOTE — NURSING NOTE
"Chief Complaint   Patient presents with     CPAP Follow Up       Initial /79   Pulse 73   Wt 98.6 kg (217 lb 6 oz)   SpO2 95%   BMI 31.64 kg/m   Estimated body mass index is 31.64 kg/m  as calculated from the following:    Height as of 1/31/23: 1.765 m (5' 9.5\").    Weight as of this encounter: 98.6 kg (217 lb 6 oz).    Medication Reconciliation: complete    DME: [facility]      EDMAR Sims  [facility]      "

## 2023-03-21 ENCOUNTER — OFFICE VISIT (OUTPATIENT)
Dept: PODIATRY | Facility: CLINIC | Age: 78
End: 2023-03-21
Payer: COMMERCIAL

## 2023-03-21 VITALS — HEIGHT: 70 IN | OXYGEN SATURATION: 97 % | WEIGHT: 217 LBS | HEART RATE: 71 BPM | BODY MASS INDEX: 31.07 KG/M2

## 2023-03-21 DIAGNOSIS — M67.471 GANGLION CYST OF RIGHT FOOT: Primary | ICD-10-CM

## 2023-03-21 PROCEDURE — 99203 OFFICE O/P NEW LOW 30 MIN: CPT | Performed by: PODIATRIST

## 2023-03-21 ASSESSMENT — PAIN SCALES - GENERAL: PAINLEVEL: NO PAIN (0)

## 2023-03-21 NOTE — PROGRESS NOTES
FOOT AND ANKLE SURGERY/PODIATRY CONSULT NOTE         ASSESSMENT:   Ganglion cyst right foot      TREATMENT:  I informed the patient that he may monitor his condition since he is asymptomatic.  There is nothing to be overly concerned about at this point due to the small size of the lesion and the fact that it is nontender.  He was informed that these lesions can become quite large and quite painful.  If that occurs I would recommend surgical removal of the ganglion cyst right foot        HPI:Rich Musa presented to the clinic today complaining of a lump on the dorsal lateral aspect of the right foot.  He has had this for several weeks.  He denies any particular trauma to his foot.  He has not had any redness or swelling.  He has no pain.  He is able to wear shoes without discomfort.  He is able to participate in all activities without restriction.  The patient is somewhat concerned because he did not notice this cyst several weeks ago.  He wanted to know the cause and wanted to know if any treatment was necessary..     Past Medical History:   Diagnosis Date     Benign prostatic hyperplasia with urinary hesitancy      BPH (benign prostatic hyperplasia)      Dyslipidemia      GERD with stricture      Nephrolithiasis      BRYN (obstructive sleep apnea)     cpap     Plantar fasciitis      Seborrheic dermatitis      Seborrheic keratosis      Trochanteric bursitis        Social History     Socioeconomic History     Marital status:      Spouse name: Not on file     Number of children: Not on file     Years of education: Not on file     Highest education level: Not on file   Occupational History     Not on file   Tobacco Use     Smoking status: Never     Smokeless tobacco: Never   Substance and Sexual Activity     Alcohol use: No     Comment: Alcoholic Drinks/day: social 3 times per month     Drug use: No     Sexual activity: Not on file   Other Topics Concern     Not on file   Social History Narrative     Not on  file     Social Determinants of Health     Financial Resource Strain: Not on file   Food Insecurity: Not on file   Transportation Needs: Not on file   Physical Activity: Not on file   Stress: Not on file   Social Connections: Not on file   Intimate Partner Violence: Not on file   Housing Stability: Not on file          Allergies   Allergen Reactions     Sulfa Drugs Rash          Current Outpatient Medications:      acetaminophen (TYLENOL) 500 MG tablet, Take 1,000 mg by mouth every 6 hours as needed for mild pain, Disp: , Rfl:      atorvastatin (LIPITOR) 10 MG tablet, Take 1 tablet (10 mg) by mouth daily, Disp: 90 tablet, Rfl: 3     Calcium Citrate-Vitamin D (CALCIUM CITRATE +D PO), Take 1 tablet by mouth 2 times daily, Disp: , Rfl:      Multiple Vitamins-Minerals (MULTIVITAMIN ADULT PO), Take 1 tablet by mouth daily, Disp: , Rfl:      naproxen (NAPROSYN) 500 MG tablet, Take 500 mg by mouth 2 times daily as needed, Disp: , Rfl:      omeprazole (PRILOSEC) 20 MG DR capsule, Take 20 mg by mouth daily, Disp: , Rfl:      Family History   Problem Relation Age of Onset     Diabetes Mother      Breast Cancer Mother      Diabetes Father      Kidney Disease Father          age 52 nephropathy     Diabetes Sister      Cancer Brother         bladder        Social History     Socioeconomic History     Marital status:      Spouse name: Not on file     Number of children: Not on file     Years of education: Not on file     Highest education level: Not on file   Occupational History     Not on file   Tobacco Use     Smoking status: Never     Smokeless tobacco: Never   Substance and Sexual Activity     Alcohol use: No     Comment: Alcoholic Drinks/day: social 3 times per month     Drug use: No     Sexual activity: Not on file   Other Topics Concern     Not on file   Social History Narrative     Not on file     Social Determinants of Health     Financial Resource Strain: Not on file   Food Insecurity: Not on file  "  Transportation Needs: Not on file   Physical Activity: Not on file   Stress: Not on file   Social Connections: Not on file   Intimate Partner Violence: Not on file   Housing Stability: Not on file        Review of Systems - Patient denies fever, chills, rash, wound, stiffness, limping, numbness, weakness, heart burn, blood in stool, chest pain with activity, calf pain when walking, shortness of breath with activity, chronic cough, easy bleeding/bruising, swelling of ankles, excessive thirst, fatigue, depression, anxiety.  Patient admits to cyst right foot.      OBJECTIVE:  Appearance: alert, well appearing, and in no distress.    Pulse 71   Ht 1.765 m (5' 9.5\")   Wt 98.4 kg (217 lb)   SpO2 97%   BMI 31.59 kg/m       Body mass index is 31.59 kg/m .     General appearance: Patient is alert and fully cooperative with history & exam.  No sign of distress is noted during the visit.  Psychiatric: Affect is pleasant & appropriate.  Patient appears motivated to improve health.  Respiratory: Breathing is regular & unlabored while sitting.  HEENT: Hearing is intact to spoken word.  Speech is clear.  No gross evidence of visual impairment that would impact ambulation.    Vascular: Dorsalis pedis and posterior tibial pulses are palpable. There is no pedal hair growth bilaterally.  CFT < 3 sec from anterior tibial surface to distal digits bilaterally. There is no appreciable edema noted.  Dermatologic: Small, raised, firm, easily movable subcutaneous mass on the dorsal lateral aspect the right foot noted.  Turgor and texture are within normal limits. No coloration or temperature changes. No other primary or secondary lesions noted.  Neurologic: All epicritic and proprioceptive sensations are grossly intact bilaterally.  Musculoskeletal: All active and passive ankle, subtalar, midtarsal, and 1st MPJ range of motion are grossly intact without pain or crepitus, with the exception of none. Manual muscle strength is within " normal limits bilaterally. All dorsiflexors, plantarflexors, invertors, evertors are intact bilaterally.  No tenderness present to the dorsal lateral aspect of the right foot on palpation.  No tenderness to the right foot or ankle with range of motion. Calf is soft/non-tender without warmth/induration    Imaging:       No images are attached to the encounter or orders placed in the encounter.     No results found.   No results found.         Melecio Oliva DPM  [facility] Foot & Ankle Surgery/Podiatry

## 2023-03-21 NOTE — LETTER
3/21/2023         RE: Rich Musa  [address] [facility] [id number]        Dear Colleague,    Thank you for referring your patient, Rich Musa, to the [facility]. Please see a copy of my visit note below.    FOOT AND ANKLE SURGERY/PODIATRY CONSULT NOTE         ASSESSMENT:   Ganglion cyst right foot      TREATMENT:  I informed the patient that he may monitor his condition since he is asymptomatic.  There is nothing to be overly concerned about at this point due to the small size of the lesion and the fact that it is nontender.  He was informed that these lesions can become quite large and quite painful.  If that occurs I would recommend surgical removal of the ganglion cyst right foot        HPI:Rich Musa presented to the clinic today complaining of a lump on the dorsal lateral aspect of the right foot.  He has had this for several weeks.  He denies any particular trauma to his foot.  He has not had any redness or swelling.  He has no pain.  He is able to wear shoes without discomfort.  He is able to participate in all activities without restriction.  The patient is somewhat concerned because he did not notice this cyst several weeks ago.  He wanted to know the cause and wanted to know if any treatment was necessary..     Past Medical History:   Diagnosis Date     Benign prostatic hyperplasia with urinary hesitancy      BPH (benign prostatic hyperplasia)      Dyslipidemia      GERD with stricture      Nephrolithiasis      BRYN (obstructive sleep apnea)     cpap     Plantar fasciitis      Seborrheic dermatitis      Seborrheic keratosis      Trochanteric bursitis        Social History     Socioeconomic History     Marital status:      Spouse name: Not on file     Number of children: Not on file     Years of education: Not on file     Highest education level: Not on file   Occupational History     Not on file   Tobacco Use     Smoking status: Never     Smokeless  tobacco: Never   Substance and Sexual Activity     Alcohol use: No     Comment: Alcoholic Drinks/day: social 3 times per month     Drug use: No     Sexual activity: Not on file   Other Topics Concern     Not on file   Social History Narrative     Not on file     Social Determinants of Health     Financial Resource Strain: Not on file   Food Insecurity: Not on file   Transportation Needs: Not on file   Physical Activity: Not on file   Stress: Not on file   Social Connections: Not on file   Intimate Partner Violence: Not on file   Housing Stability: Not on file          Allergies   Allergen Reactions     Sulfa Drugs Rash          Current Outpatient Medications:      acetaminophen (TYLENOL) 500 MG tablet, Take 1,000 mg by mouth every 6 hours as needed for mild pain, Disp: , Rfl:      atorvastatin (LIPITOR) 10 MG tablet, Take 1 tablet (10 mg) by mouth daily, Disp: 90 tablet, Rfl: 3     Calcium Citrate-Vitamin D (CALCIUM CITRATE +D PO), Take 1 tablet by mouth 2 times daily, Disp: , Rfl:      Multiple Vitamins-Minerals (MULTIVITAMIN ADULT PO), Take 1 tablet by mouth daily, Disp: , Rfl:      naproxen (NAPROSYN) 500 MG tablet, Take 500 mg by mouth 2 times daily as needed, Disp: , Rfl:      omeprazole (PRILOSEC) 20 MG DR capsule, Take 20 mg by mouth daily, Disp: , Rfl:      Family History   Problem Relation Age of Onset     Diabetes Mother      Breast Cancer Mother      Diabetes Father      Kidney Disease Father          age 52 nephropathy     Diabetes Sister      Cancer Brother         bladder        Social History     Socioeconomic History     Marital status:      Spouse name: Not on file     Number of children: Not on file     Years of education: Not on file     Highest education level: Not on file   Occupational History     Not on file   Tobacco Use     Smoking status: Never     Smokeless tobacco: Never   Substance and Sexual Activity     Alcohol use: No     Comment: Alcoholic Drinks/day: social 3 times per  "month     Drug use: No     Sexual activity: Not on file   Other Topics Concern     Not on file   Social History Narrative     Not on file     Social Determinants of Health     Financial Resource Strain: Not on file   Food Insecurity: Not on file   Transportation Needs: Not on file   Physical Activity: Not on file   Stress: Not on file   Social Connections: Not on file   Intimate Partner Violence: Not on file   Housing Stability: Not on file        Review of Systems - Patient denies fever, chills, rash, wound, stiffness, limping, numbness, weakness, heart burn, blood in stool, chest pain with activity, calf pain when walking, shortness of breath with activity, chronic cough, easy bleeding/bruising, swelling of ankles, excessive thirst, fatigue, depression, anxiety.  Patient admits to cyst right foot.      OBJECTIVE:  Appearance: alert, well appearing, and in no distress.    Pulse 71   Ht 1.765 m (5' 9.5\")   Wt 98.4 kg (217 lb)   SpO2 97%   BMI 31.59 kg/m       Body mass index is 31.59 kg/m .     General appearance: Patient is alert and fully cooperative with history & exam.  No sign of distress is noted during the visit.  Psychiatric: Affect is pleasant & appropriate.  Patient appears motivated to improve health.  Respiratory: Breathing is regular & unlabored while sitting.  HEENT: Hearing is intact to spoken word.  Speech is clear.  No gross evidence of visual impairment that would impact ambulation.    Vascular: Dorsalis pedis and posterior tibial pulses are palpable. There is no pedal hair growth bilaterally.  CFT < 3 sec from anterior tibial surface to distal digits bilaterally. There is no appreciable edema noted.  Dermatologic: Small, raised, firm, easily movable subcutaneous mass on the dorsal lateral aspect the right foot noted.  Turgor and texture are within normal limits. No coloration or temperature changes. No other primary or secondary lesions noted.  Neurologic: All epicritic and proprioceptive " sensations are grossly intact bilaterally.  Musculoskeletal: All active and passive ankle, subtalar, midtarsal, and 1st MPJ range of motion are grossly intact without pain or crepitus, with the exception of none. Manual muscle strength is within normal limits bilaterally. All dorsiflexors, plantarflexors, invertors, evertors are intact bilaterally.  No tenderness present to the dorsal lateral aspect of the right foot on palpation.  No tenderness to the right foot or ankle with range of motion. Calf is soft/non-tender without warmth/induration    Imaging:       No images are attached to the encounter or orders placed in the encounter.     No results found.   No results found.         Melecio Oliva DPM  [facility] Foot & Ankle Surgery/Podiatry         Again, thank you for allowing me to participate in the care of your patient.        Sincerely,        Melecio Casillas DPM

## 2023-03-28 ENCOUNTER — TRANSFERRED RECORDS (OUTPATIENT)
Dept: HEALTH INFORMATION MANAGEMENT | Facility: CLINIC | Age: 78
End: 2023-03-28

## 2023-04-28 DIAGNOSIS — K22.2 GERD WITH STRICTURE: Primary | ICD-10-CM

## 2023-04-28 DIAGNOSIS — K21.9 GERD WITH STRICTURE: Primary | ICD-10-CM

## 2023-04-28 NOTE — TELEPHONE ENCOUNTER
"Routing refill request to provider for review/approval because:  Medication is reported/historical    Last Written Prescription Date:  6/6/2022  Last Fill Quantity: X,  # refills: X   Last office visit provider:  01/31/2023     Requested Prescriptions   Pending Prescriptions Disp Refills     omeprazole (PRILOSEC) 20 MG DR capsule [Pharmacy Med Name: Omeprazole Oral Capsule Delayed Release 20 MG] 180 capsule 0     Sig: Take 1 capsule (20 mg) by mouth 2 times daily as needed       PPI Protocol Passed - 4/28/2023  3:35 PM        Passed - Not on Clopidogrel (unless Pantoprazole ordered)        Passed - No diagnosis of osteoporosis on record        Passed - Recent (12 mo) or future (30 days) visit within the authorizing provider's specialty     Patient has had an office visit with the authorizing provider or a provider within the authorizing providers department within the previous 12 mos or has a future within next 30 days. See \"Patient Info\" tab in inbasket, or \"Choose Columns\" in Meds & Orders section of the refill encounter.              Passed - Medication is active on med list        Passed - Patient is age 18 or older             MAXIMINO CONNER RN 04/28/23 3:35 PM  "

## 2023-07-06 ENCOUNTER — OFFICE VISIT (OUTPATIENT)
Dept: INTERNAL MEDICINE | Facility: CLINIC | Age: 78
End: 2023-07-06
Payer: COMMERCIAL

## 2023-07-06 VITALS
OXYGEN SATURATION: 96 % | BODY MASS INDEX: 29.06 KG/M2 | SYSTOLIC BLOOD PRESSURE: 118 MMHG | DIASTOLIC BLOOD PRESSURE: 64 MMHG | WEIGHT: 203 LBS | TEMPERATURE: 97.8 F | RESPIRATION RATE: 18 BRPM | HEIGHT: 70 IN | HEART RATE: 67 BPM

## 2023-07-06 DIAGNOSIS — R63.4 WEIGHT LOSS: ICD-10-CM

## 2023-07-06 DIAGNOSIS — R10.30 LOWER ABDOMINAL PAIN, UNSPECIFIED: ICD-10-CM

## 2023-07-06 DIAGNOSIS — M62.81 GENERALIZED MUSCLE WEAKNESS: ICD-10-CM

## 2023-07-06 DIAGNOSIS — R19.7 DIARRHEA OF PRESUMED INFECTIOUS ORIGIN: Primary | ICD-10-CM

## 2023-07-06 LAB
ALBUMIN SERPL BCG-MCNC: 4.5 G/DL (ref 3.5–5.2)
ALP SERPL-CCNC: 51 U/L (ref 40–129)
ALT SERPL W P-5'-P-CCNC: 9 U/L (ref 0–70)
ANION GAP SERPL CALCULATED.3IONS-SCNC: 13 MMOL/L (ref 7–15)
AST SERPL W P-5'-P-CCNC: 25 U/L (ref 0–45)
BASOPHILS # BLD AUTO: 0 10E3/UL (ref 0–0.2)
BASOPHILS NFR BLD AUTO: 0 %
BILIRUB SERPL-MCNC: 1.2 MG/DL
BUN SERPL-MCNC: 16.4 MG/DL (ref 8–23)
CALCIUM SERPL-MCNC: 9.4 MG/DL (ref 8.8–10.2)
CHLORIDE SERPL-SCNC: 112 MMOL/L (ref 98–107)
CREAT SERPL-MCNC: 1.1 MG/DL (ref 0.67–1.17)
DEPRECATED HCO3 PLAS-SCNC: 25 MMOL/L (ref 22–29)
EOSINOPHIL # BLD AUTO: 0.3 10E3/UL (ref 0–0.7)
EOSINOPHIL NFR BLD AUTO: 5 %
ERYTHROCYTE [DISTWIDTH] IN BLOOD BY AUTOMATED COUNT: 13.1 % (ref 10–15)
GFR SERPL CREATININE-BSD FRML MDRD: 70 ML/MIN/1.73M2
GLUCOSE SERPL-MCNC: 98 MG/DL (ref 70–99)
HCT VFR BLD AUTO: 44.4 % (ref 40–53)
HGB BLD-MCNC: 14.8 G/DL (ref 13.3–17.7)
IMM GRANULOCYTES # BLD: 0 10E3/UL
IMM GRANULOCYTES NFR BLD: 0 %
LYMPHOCYTES # BLD AUTO: 0.9 10E3/UL (ref 0.8–5.3)
LYMPHOCYTES NFR BLD AUTO: 15 %
MCH RBC QN AUTO: 28.6 PG (ref 26.5–33)
MCHC RBC AUTO-ENTMCNC: 33.3 G/DL (ref 31.5–36.5)
MCV RBC AUTO: 86 FL (ref 78–100)
MONOCYTES # BLD AUTO: 0.8 10E3/UL (ref 0–1.3)
MONOCYTES NFR BLD AUTO: 13 %
NEUTROPHILS # BLD AUTO: 4.1 10E3/UL (ref 1.6–8.3)
NEUTROPHILS NFR BLD AUTO: 67 %
PLATELET # BLD AUTO: 173 10E3/UL (ref 150–450)
POTASSIUM SERPL-SCNC: 4.6 MMOL/L (ref 3.4–5.3)
PROT SERPL-MCNC: 7.3 G/DL (ref 6.4–8.3)
RBC # BLD AUTO: 5.17 10E6/UL (ref 4.4–5.9)
SODIUM SERPL-SCNC: 150 MMOL/L (ref 136–145)
WBC # BLD AUTO: 6 10E3/UL (ref 4–11)

## 2023-07-06 PROCEDURE — 0134A COVID-19 BIVALENT 18+ (MODERNA): CPT | Performed by: INTERNAL MEDICINE

## 2023-07-06 PROCEDURE — 36415 COLL VENOUS BLD VENIPUNCTURE: CPT | Performed by: INTERNAL MEDICINE

## 2023-07-06 PROCEDURE — 80053 COMPREHEN METABOLIC PANEL: CPT | Performed by: INTERNAL MEDICINE

## 2023-07-06 PROCEDURE — 99214 OFFICE O/P EST MOD 30 MIN: CPT | Mod: 25 | Performed by: INTERNAL MEDICINE

## 2023-07-06 PROCEDURE — 85025 COMPLETE CBC W/AUTO DIFF WBC: CPT | Performed by: INTERNAL MEDICINE

## 2023-07-06 PROCEDURE — 91313 COVID-19 BIVALENT 18+ (MODERNA): CPT | Performed by: INTERNAL MEDICINE

## 2023-07-06 ASSESSMENT — ENCOUNTER SYMPTOMS: DIARRHEA: 1

## 2023-07-06 NOTE — PROGRESS NOTES
1. Diarrhea of presumed infectious origin  Probable viral gastroenteritis versus other infectious etiology.  Labs as below.  Given the tenderness, if his white count is elevated or if he is not improving would recommend CT scan.  He does not have a history of diverticuli on colonoscopies.  We discussed other possible etiologies including Misys microscopic colitis.  If his symptoms do not continue to improve he will need GI evaluation and scoping.  He is leaving town next week.  Again he is feeling better already it sounds like.  I did urged him to utilize some as needed Imodium if his testing all turns out fine.  - Enteric Bacteria and Virus Panel by [facility] Stool; Future  - C. difficile Toxin B PCR with reflex to C. difficile Antigen and Toxins A/B EIA; Future  - Ova and Parasite Exam Routine; Standing  - Comprehensive metabolic panel (BMP + Alb, Alk Phos, ALT, AST, Total. Bili, TP); Future  - CBC with platelets and differential; Future  - Enteric Bacteria and Virus Panel by [facility] Stool  - C. difficile Toxin B PCR with reflex to C. difficile Antigen and Toxins A/B EIA  - Ova and Parasite Exam Routine  - Comprehensive metabolic panel (BMP + Alb, Alk Phos, ALT, AST, Total. Bili, TP)  - CBC with platelets and differential    2. Generalized muscle weakness  We will check labs.  P.o. as tolerated.    3. Weight loss  As above.    4. Lower abdominal pain, unspecified  As above.  - Enteric Bacteria and Virus Panel by [facility] Stool; Future  - Enteric Bacteria and Virus Panel by [facility] Stool    Rose Mary Rubalcava is a 78 year old, presenting for the following health issues:  Diarrhea (X 6 days (pt reports about 4-5 trips to the restroom); no blood in stools ) and Ear Fullness (Would like bilateral ears check for wax )        7/6/2023     9:11 AM   Additional Questions   Roomed by Karen     Diarrhea    History of Present Illness       Reason for visit:  Diahreepema  Symptom onset:  3-7 days ago  Symptoms include:  Runny stools  Symptom  "intensity:  Severe  Symptom progression:  Staying the same  Had these symptoms before:  Yes  Has tried/received treatment for these symptoms:  No  What makes it worse:  Eating  What makes it better:  Going to bathroom    He eats 2-3 servings of fruits and vegetables daily.He consumes 0 sweetened beverage(s) daily.He exercises with enough effort to increase his heart rate 30 to 60 minutes per day.  He exercises with enough effort to increase his heart rate 5 days per week.   He is taking medications regularly.         On comes in today with about 5 days of profuse diarrhea.  Watery.  Wakes him up from sleep.  No blood.  Low abdominal pain bilaterally.  Things seem to be a little better today he tells me.  Less watery.  Still looser but getting some more formed to it.  No fevers or chills or any of this.  He has lost some weight.  No sick contacts.  No vomiting.  No recent travel.  He does note a mild weakness.  He wants to be checked for earwax.      Review of Systems   Gastrointestinal: Positive for diarrhea.            Objective    /64 (BP Location: Left arm, Patient Position: Sitting, Cuff Size: Adult Large)   Pulse 67   Temp 97.8  F (36.6  C) (Tympanic)   Resp 18   Ht 1.765 m (5' 9.5\")   Wt 92.1 kg (203 lb)   SpO2 96%   BMI 29.55 kg/m    Body mass index is 29.55 kg/m .  Physical Exam       Pleasant gentleman who does not appear in to be in any distress.  Abdomen is soft throughout.  Mild tenderness in the lower quadrants bilaterally.  No guarding or rebound.  No masses palpable.                "

## 2023-07-07 ENCOUNTER — TELEPHONE (OUTPATIENT)
Dept: INTERNAL MEDICINE | Facility: CLINIC | Age: 78
End: 2023-07-07
Payer: COMMERCIAL

## 2023-07-07 NOTE — TELEPHONE ENCOUNTER
Patient states he is feeling much better.  Patient has not had any diarrhea today.  Patient would like to know if bringing in stool sample is still necessary if he continues to improve.    07/06 Office visit note:  1. Diarrhea of presumed infectious origin  Probable viral gastroenteritis versus other infectious etiology.  Labs as below.  Given the tenderness, if his white count is elevated or if he is not improving would recommend CT scan.  He does not have a history of diverticuli on colonoscopies.  We discussed other possible etiologies including Misys microscopic colitis.  If his symptoms do not continue to improve he will need GI evaluation and scoping.  He is leaving town next week.  Again he is feeling better already it sounds like.  I did urged him to utilize some as needed Imodium if his testing all turns out fine.  - Enteric Bacteria and Virus Panel by [facility] Stool; Future  - C. difficile Toxin B PCR with reflex to C. difficile Antigen and Toxins A/B EIA; Future  - Ova and Parasite Exam Routine; Standing  - Comprehensive metabolic panel (BMP + Alb, Alk Phos, ALT, AST, Total. Bili, TP); Future  - CBC with platelets and differential; Future  - Enteric Bacteria and Virus Panel by [facility] Stool  - C. difficile Toxin B PCR with reflex to C. difficile Antigen and Toxins A/B EIA  - Ova and Parasite Exam Routine  - Comprehensive metabolic panel (BMP + Alb, Alk Phos, ALT, AST, Total. Bili, TP)  - CBC with platelets and differential

## 2023-07-07 NOTE — TELEPHONE ENCOUNTER
If diarrhea is getting better and stools are more formed, no need to submit stool culture, if diarrhea persistent and getting worse but he has watery stools, that should submit stool culture.

## 2023-07-07 NOTE — TELEPHONE ENCOUNTER
Khadar, your sodium is very high consistent with dehydration.  Please make sure you are drinking plenty of water.   Written by Antonio Hawley MD on 7/6/2023 12:39 PM CDT

## 2023-10-04 ENCOUNTER — TRANSFERRED RECORDS (OUTPATIENT)
Dept: HEALTH INFORMATION MANAGEMENT | Facility: CLINIC | Age: 78
End: 2023-10-04
Payer: COMMERCIAL

## 2023-10-09 ENCOUNTER — OFFICE VISIT (OUTPATIENT)
Dept: INTERNAL MEDICINE | Facility: CLINIC | Age: 78
End: 2023-10-09
Payer: COMMERCIAL

## 2023-10-09 VITALS
HEIGHT: 70 IN | BODY MASS INDEX: 29.92 KG/M2 | OXYGEN SATURATION: 95 % | WEIGHT: 209 LBS | DIASTOLIC BLOOD PRESSURE: 68 MMHG | TEMPERATURE: 97.8 F | RESPIRATION RATE: 16 BRPM | HEART RATE: 64 BPM | SYSTOLIC BLOOD PRESSURE: 122 MMHG

## 2023-10-09 DIAGNOSIS — H61.21 IMPACTED CERUMEN OF RIGHT EAR: Primary | ICD-10-CM

## 2023-10-09 PROCEDURE — 99212 OFFICE O/P EST SF 10 MIN: CPT | Mod: 25 | Performed by: INTERNAL MEDICINE

## 2023-10-09 PROCEDURE — 69209 REMOVE IMPACTED EAR WAX UNI: CPT | Mod: RT | Performed by: INTERNAL MEDICINE

## 2023-10-09 PROCEDURE — 90662 IIV NO PRSV INCREASED AG IM: CPT | Performed by: INTERNAL MEDICINE

## 2023-10-09 PROCEDURE — G0008 ADMIN INFLUENZA VIRUS VAC: HCPCS | Performed by: INTERNAL MEDICINE

## 2023-10-09 NOTE — PROGRESS NOTES
"  1. Impacted cerumen of right ear  Cerumen impaction irrigated today.  - REMOVE IMPACTED CERUMEN     Patient identified using two patient identifiers.  Ear exam showing wax occlusion completed by JAILENE Jones.  Solution: warm water was placed in the right ear(s) via irrigation tool: elephant ear.      Flu shot today.  He will get his COVID booster at his leisure.  Subjective   Khadar is a 78 year old, presenting for the following health issues:  Ear Fullness (Bilateral ear fullness; ear lavage is needed )      10/9/2023     9:30 AM   Additional Questions   Roomed by Karen       History of Present Illness       Reason for visit:  Ear flush - flu shot - possible covid booster    He eats 2-3 servings of fruits and vegetables daily.He consumes 0 sweetened beverage(s) daily.He exercises with enough effort to increase his heart rate 30 to 60 minutes per day.  He exercises with enough effort to increase his heart rate 4 days per week.   He is taking medications regularly.           Khadar has fullness in his ears and decreased hearing especially on the right.  Needs a irrigation if needed.  He also would like his flu shot today.      Review of Systems         Objective    /68 (BP Location: Left arm, Patient Position: Sitting, Cuff Size: Adult Regular)   Pulse 64   Temp 97.8  F (36.6  C) (Tympanic)   Resp 16   Ht 1.765 m (5' 9.5\")   Wt 94.8 kg (209 lb)   SpO2 95%   BMI 30.42 kg/m    Body mass index is 30.42 kg/m .  Physical Exam       Left ear is normal and clean.  Right ear is cerumen occluded.                  "

## 2023-10-27 DIAGNOSIS — K22.2 GERD WITH STRICTURE: ICD-10-CM

## 2023-10-27 DIAGNOSIS — K21.9 GERD WITH STRICTURE: ICD-10-CM

## 2024-01-23 ENCOUNTER — NURSE TRIAGE (OUTPATIENT)
Dept: INTERNAL MEDICINE | Facility: CLINIC | Age: 79
End: 2024-01-23
Payer: COMMERCIAL

## 2024-01-23 DIAGNOSIS — U07.1 INFECTION DUE TO 2019 NOVEL CORONAVIRUS: Primary | ICD-10-CM

## 2024-01-23 NOTE — TELEPHONE ENCOUNTER
January 23, 2024      COVID Positive/Requesting COVID treatment    Patient is positive for COVID and requesting treatment options.    Date of positive COVID test (PCR or at home)? 01/23/2024  Current COVID symptoms: fever or chills, cough, fatigue, muscle or body aches, sore throat, and congestion or runny nose  Date COVID symptoms began: 01/21/2024    Message should be routed to clinic RN pool. Best phone number to use for call back: [phone]    Clarisse Galvan

## 2024-01-23 NOTE — TELEPHONE ENCOUNTER
RN COVID TREATMENT VISIT  01/23/24    The patient has been triaged and does not require a higher level of care.    Rich Musa  78 year old  Current weight? 209    Has the patient been seen by a primary care provider at an [facility] within the past two years? Yes.   Have you been in close proximity to/do you have a known exposure to a person with a confirmed case of influenza? No.     General treatment eligibility:  Date of positive COVID test (PCR or at home)?  1/23/24    Are you or have you been hospitalized for this COVID-19 infection? No.   Have you received monoclonal antibodies or antiviral treatment for COVID-19 since this positive test? No.   Do you have any of the following conditions that place you at risk of being very sick from COVID-19?   - Age 50 years or older  Yes, patient has at least one high risk condition as noted above.     Current COVID symptoms:   - fever or chills  - cough  - fatigue  - muscle or body aches  - sore throat  - congestion or runny nose  Yes. Patient has at least one symptom as selected.     How many days since symptoms started? 5 days or less. Established patient, 12 years or older weighing at least 88.2 lbs, who has symptoms that started in the past 5 days, has not been hospitalized nor received treatment already, and is at risk for being very sick from COVID-19.     Treatment eligibility by RN:  Are you currently pregnant or nursing? No  Do you have a clinically significant hypersensitivity to nirmatrelvir or ritonavir, or toxic epidermal necrolysis (TEN) or Salcido-Demarcus Syndrome? No  Do you have a history of hepatitis, any hepatic impairment on the Problem List (such as Child-Pittman Class C, cirrhosis, fatty liver disease, alcoholic liver disease), or was the last liver lab (hepatic panel, ALT, AST, ALK Phos, bilirubin) elevated in the past 6 months? No  Do you have any history of severe renal impairment (eGFR < 30mL/min)?  No    Is patient eligible to continue? Yes, patient meets all eligibility requirements for the RN COVID treatment (as denoted by all no responses above).     Current Outpatient Medications   Medication Sig Dispense Refill    acetaminophen (TYLENOL) 500 MG tablet Take 1,000 mg by mouth every 6 hours as needed for mild pain      atorvastatin (LIPITOR) 10 MG tablet Take 1 tablet (10 mg) by mouth daily 90 tablet 3    Calcium Citrate-Vitamin D (CALCIUM CITRATE +D PO) Take 1 tablet by mouth 2 times daily      Multiple Vitamins-Minerals (MULTIVITAMIN ADULT PO) Take 1 tablet by mouth daily      naproxen (NAPROSYN) 500 MG tablet Take 500 mg by mouth 2 times daily as needed      omeprazole (PRILOSEC) 20 MG DR capsule TAKE 1 CAPSULE (20 MG) BY MOUTH 2 TIMES DAILY AS NEEDED 180 capsule 0       Medications from List 1 of the standing order (on medications that exclude the use of Paxlovid) that patient is taking: NONE. Is patient taking [place]'s Wort? No  Is patient taking [place]'s Wort or any meds from List 1? No.   Medications from List 2 of the standing order (on meds that provider needs to adjust) that patient is taking: NONE. Is patient on any of the meds from List 2? No.   Medications from List 3 of standing order (on meds that a RN needs to adjust) that patient is taking: atorvastatin (Lipitor): Instructed patient to stop atorvastatin while taking Paxlovid and restart atorvastatin 1 day after the completion of Paxlovid.  Is patient on any meds from List 3? Yes. Patient is on meds from list 3. No meds require a provider visit and at least one med required RN to adjust.     Paxlovid has an approximate 90% reduction in hospitalization. Paxlovid can possibly cause altered sense of taste, diarrhea (loose, watery stools), high blood pressure, muscle aches.     Would patient like a Paxlovid prescription?   Yes.   Lab Results   Component Value Date    GFRESTIMATED 70 07/06/2023       Was last eGFR reduced? No, eGFR 60 or  greater/ No Result on record. Patient can receive the normal renal function dose. Paxlovid Rx sent to [facility]   [facility] in [place]    Temporary change to home medications: atorvastatin (Lipitor): Instructed patient to stop atorvastatin while taking Paxlovid and restart atorvastatin 1 day after the completion of Paxlovid.     All medication adjustments (holds, etc) were discussed with the patient and patient was asked to repeat back (teachback) their med adjustment.  Did patient understand med adjustment? Yes, patient repeated back and understood correctly.        Reviewed the following instructions with the patient:    Paxlovid (nimatrelvir and ritonavir)    How it works  Two medicines (nirmatrelvir and ritonavir) are taken together. They stop the virus from growing. Less amount of virus is easier for your body to fight.    How to take  Medicine comes in a daily container with both medicine tablets. Take by mouth twice daily (once in the morning, once at night) for 5 days.  The number of tablets to take varies by patient.  Don't chew or break capsules. Swallow whole.    When to take  Take as soon as possible after positive COVID-19 test result, and within 5 days of your first symptoms.    Possible side effects  Can cause altered sense of taste, diarrhea (loose, watery stools), high blood pressure, muscle aches.    Charu Heck, ZOIE       Reason for Disposition   [1] HIGH RISK patient (e.g., weak immune system, age > 64 years, obesity with BMI 30 or higher, pregnant, chronic lung disease or other chronic medical condition) AND [2] COVID symptoms (e.g., cough, fever)  (Exceptions: Already seen by PCP and no new or worsening symptoms.)    Additional Information   Negative: SEVERE difficulty breathing (e.g., struggling for each breath, speaks in single words)   Negative: Difficult to awaken or acting confused (e.g., disoriented, slurred speech)   Negative: Bluish (or gray) lips or face now   Negative: Shock  suspected (e.g., cold/pale/clammy skin, too weak to stand, low BP, rapid pulse)   Negative: Sounds like a life-threatening emergency to the triager   Negative: [1] Diagnosed or suspected COVID-19 AND [2] symptoms lasting 3 or more weeks   Negative: [1] COVID-19 exposure AND [2] no symptoms   Negative: COVID-19 vaccine reaction suspected (e.g., fever, headache, muscle aches) occurring 1 to 3 days after getting vaccine   Negative: COVID-19 vaccine, questions about   Negative: [1] Lives with someone known to have influenza (flu test positive) AND [2] flu-like symptoms (e.g., cough, runny nose, sore throat, SOB; with or without fever)   Negative: [1] Possible COVID-19 symptoms AND [2] triager concerned about severity of symptoms or other causes   Negative: COVID-19 and breastfeeding, questions about   Negative: SEVERE or constant chest pain or pressure  (Exception: Mild central chest pain, present only when coughing.)   Negative: MODERATE difficulty breathing (e.g., speaks in phrases, SOB even at rest, pulse 100-120)   Negative: [1] Headache AND [2] stiff neck (can't touch chin to chest)   Negative: Oxygen level (e.g., pulse oximetry) 90 percent or lower   Negative: Chest pain or pressure  (Exception: MILD central chest pain, present only when coughing.)   Negative: [1] Drinking very little AND [2] dehydration suspected (e.g., no urine > 12 hours, very dry mouth, very lightheaded)   Negative: Patient sounds very sick or weak to the triager   Negative: MILD difficulty breathing (e.g., minimal/no SOB at rest, SOB with walking, pulse <100)   Negative: Fever > 103 F (39.4 C)   Negative: [1] Fever > 101 F (38.3 C) AND [2] age > 60 years   Negative: [1] Fever > 100.0 F (37.8 C) AND [2] bedridden (e.g., CVA, chronic illness, recovering from surgery)   Negative: Oxygen level (e.g., pulse oximetry) 91 to 94 percent    Protocols used: Coronavirus (COVID-19) Diagnosed or Enrolbidp-U-IS

## 2024-01-25 ASSESSMENT — ENCOUNTER SYMPTOMS
FEVER: 1
FREQUENCY: 0
MYALGIAS: 0
NERVOUS/ANXIOUS: 0
CONSTIPATION: 0
DIZZINESS: 0
NAUSEA: 0
PALPITATIONS: 0
HEARTBURN: 0
WEAKNESS: 0
CHILLS: 1
PARESTHESIAS: 0
ABDOMINAL PAIN: 0
SHORTNESS OF BREATH: 0
DIARRHEA: 1
HEMATURIA: 0
ARTHRALGIAS: 0
HEADACHES: 1
EYE PAIN: 0
JOINT SWELLING: 0
SORE THROAT: 1
HEMATOCHEZIA: 0
COUGH: 0
DYSURIA: 0

## 2024-01-25 ASSESSMENT — ACTIVITIES OF DAILY LIVING (ADL): CURRENT_FUNCTION: NO ASSISTANCE NEEDED

## 2024-02-01 ENCOUNTER — OFFICE VISIT (OUTPATIENT)
Dept: INTERNAL MEDICINE | Facility: CLINIC | Age: 79
End: 2024-02-01
Payer: COMMERCIAL

## 2024-02-01 VITALS
BODY MASS INDEX: 30.96 KG/M2 | DIASTOLIC BLOOD PRESSURE: 64 MMHG | HEART RATE: 70 BPM | RESPIRATION RATE: 14 BRPM | SYSTOLIC BLOOD PRESSURE: 122 MMHG | TEMPERATURE: 97.9 F | HEIGHT: 69 IN | OXYGEN SATURATION: 97 % | WEIGHT: 209 LBS

## 2024-02-01 DIAGNOSIS — K21.9 GERD WITH STRICTURE: ICD-10-CM

## 2024-02-01 DIAGNOSIS — Z00.00 ENCOUNTER FOR MEDICARE ANNUAL WELLNESS EXAM: Primary | ICD-10-CM

## 2024-02-01 DIAGNOSIS — U07.1 INFECTION DUE TO 2019 NOVEL CORONAVIRUS: ICD-10-CM

## 2024-02-01 DIAGNOSIS — E78.5 DYSLIPIDEMIA: ICD-10-CM

## 2024-02-01 DIAGNOSIS — Z29.11 NEED FOR VACCINATION AGAINST RESPIRATORY SYNCYTIAL VIRUS: ICD-10-CM

## 2024-02-01 DIAGNOSIS — J02.9 SORE THROAT: ICD-10-CM

## 2024-02-01 DIAGNOSIS — R13.12 OROPHARYNGEAL DYSPHAGIA: ICD-10-CM

## 2024-02-01 DIAGNOSIS — K22.2 GERD WITH STRICTURE: ICD-10-CM

## 2024-02-01 DIAGNOSIS — Z90.79 S/P TURP: ICD-10-CM

## 2024-02-01 LAB
ALBUMIN SERPL BCG-MCNC: 4.3 G/DL (ref 3.5–5.2)
ALBUMIN UR-MCNC: NEGATIVE MG/DL
ALP SERPL-CCNC: 59 U/L (ref 40–150)
ALT SERPL W P-5'-P-CCNC: 12 U/L (ref 0–70)
ANION GAP SERPL CALCULATED.3IONS-SCNC: 12 MMOL/L (ref 7–15)
APPEARANCE UR: CLEAR
AST SERPL W P-5'-P-CCNC: 27 U/L (ref 0–45)
BILIRUB SERPL-MCNC: 0.5 MG/DL
BILIRUB UR QL STRIP: NEGATIVE
BUN SERPL-MCNC: 20.7 MG/DL (ref 8–23)
CALCIUM SERPL-MCNC: 9.4 MG/DL (ref 8.8–10.2)
CHLORIDE SERPL-SCNC: 104 MMOL/L (ref 98–107)
CHOLEST SERPL-MCNC: 173 MG/DL
COLOR UR AUTO: YELLOW
CREAT SERPL-MCNC: 1.07 MG/DL (ref 0.67–1.17)
DEPRECATED HCO3 PLAS-SCNC: 26 MMOL/L (ref 22–29)
EGFRCR SERPLBLD CKD-EPI 2021: 72 ML/MIN/1.73M2
ERYTHROCYTE [DISTWIDTH] IN BLOOD BY AUTOMATED COUNT: 12.6 % (ref 10–15)
FASTING STATUS PATIENT QL REPORTED: YES
GLUCOSE SERPL-MCNC: 104 MG/DL (ref 70–99)
GLUCOSE UR STRIP-MCNC: NEGATIVE MG/DL
HCT VFR BLD AUTO: 44.5 % (ref 40–53)
HDLC SERPL-MCNC: 36 MG/DL
HGB BLD-MCNC: 14.7 G/DL (ref 13.3–17.7)
HGB UR QL STRIP: NEGATIVE
KETONES UR STRIP-MCNC: NEGATIVE MG/DL
LDLC SERPL CALC-MCNC: 94 MG/DL
LEUKOCYTE ESTERASE UR QL STRIP: NEGATIVE
MCH RBC QN AUTO: 28.6 PG (ref 26.5–33)
MCHC RBC AUTO-ENTMCNC: 33 G/DL (ref 31.5–36.5)
MCV RBC AUTO: 87 FL (ref 78–100)
NITRATE UR QL: NEGATIVE
NONHDLC SERPL-MCNC: 137 MG/DL
PH UR STRIP: 7 [PH] (ref 5–8)
PLATELET # BLD AUTO: 234 10E3/UL (ref 150–450)
POTASSIUM SERPL-SCNC: 4.8 MMOL/L (ref 3.4–5.3)
PROT SERPL-MCNC: 7.4 G/DL (ref 6.4–8.3)
RBC # BLD AUTO: 5.14 10E6/UL (ref 4.4–5.9)
SODIUM SERPL-SCNC: 142 MMOL/L (ref 135–145)
SP GR UR STRIP: 1.02 (ref 1–1.03)
TRIGL SERPL-MCNC: 216 MG/DL
UROBILINOGEN UR STRIP-ACNC: 0.2 E.U./DL
WBC # BLD AUTO: 6 10E3/UL (ref 4–11)

## 2024-02-01 PROCEDURE — 80053 COMPREHEN METABOLIC PANEL: CPT | Performed by: INTERNAL MEDICINE

## 2024-02-01 PROCEDURE — 36415 COLL VENOUS BLD VENIPUNCTURE: CPT | Performed by: INTERNAL MEDICINE

## 2024-02-01 PROCEDURE — 85027 COMPLETE CBC AUTOMATED: CPT | Performed by: INTERNAL MEDICINE

## 2024-02-01 PROCEDURE — G0439 PPPS, SUBSEQ VISIT: HCPCS | Performed by: INTERNAL MEDICINE

## 2024-02-01 PROCEDURE — 99214 OFFICE O/P EST MOD 30 MIN: CPT | Mod: 25 | Performed by: INTERNAL MEDICINE

## 2024-02-01 PROCEDURE — 81003 URINALYSIS AUTO W/O SCOPE: CPT | Performed by: INTERNAL MEDICINE

## 2024-02-01 PROCEDURE — 80061 LIPID PANEL: CPT | Performed by: INTERNAL MEDICINE

## 2024-02-01 RX ORDER — RESPIRATORY SYNCYTIAL VIRUS VACCINE 120MCG/0.5
0.5 KIT INTRAMUSCULAR ONCE
Qty: 1 EACH | Refills: 0 | Status: SHIPPED | OUTPATIENT
Start: 2024-02-01 | End: 2024-02-01

## 2024-02-01 ASSESSMENT — ENCOUNTER SYMPTOMS
NAUSEA: 0
FEVER: 0
ARTHRALGIAS: 0
JOINT SWELLING: 0
PALPITATIONS: 0
CHILLS: 0
FREQUENCY: 0
EYE PAIN: 0
HEADACHES: 0
MYALGIAS: 0
HEMATURIA: 0
SHORTNESS OF BREATH: 0
DYSURIA: 0
DIARRHEA: 0
SORE THROAT: 1
ABDOMINAL PAIN: 0
DIZZINESS: 0
NERVOUS/ANXIOUS: 0
TROUBLE SWALLOWING: 1
COUGH: 0
CONSTIPATION: 0
WEAKNESS: 0

## 2024-02-01 ASSESSMENT — ACTIVITIES OF DAILY LIVING (ADL): CURRENT_FUNCTION: NO ASSISTANCE NEEDED

## 2024-02-01 NOTE — PATIENT INSTRUCTIONS
"Eating Healthy Foods: Care Instructions  With every meal, you can make healthy food choices. Try to eat a variety of fruits, vegetables, whole grains, lean proteins, and low-fat dairy products. This can help you get the right balance of nutrients, including vitamins and minerals. Small changes add up over time. You can start by adding one healthy food to your meals each day.    Try to make half your plate fruits and vegetables, one-fourth whole grains, and one-fourth lean proteins. Try including dairy with your meals.   Eat more fruits and vegetables. Try to have them with most meals and snacks.   Foods for healthy eating    Fruits    These can be fresh, frozen, canned, or dried.  Try to choose whole fruit rather than fruit juice.  Eat a variety of colors.    Vegetables    These can be fresh, frozen, canned, or dried.  Beans, peas, and lentils count too.    Whole grains    Choose whole-grain breads, cereals, and noodles.  Try brown rice.    Lean proteins    These can include lean meat, poultry, fish, and eggs.  You can also have tofu, beans, peas, lentils, nuts, and seeds.    Dairy    Try milk, yogurt, and cheese.  Choose low-fat or fat-free when you can.  If you need to, use lactose-free milk or fortified plant-based milk products, such as soy milk.    Water    Drink water when you're thirsty.  Limit sugar-sweetened drinks, including soda, fruit drinks, and sports drinks.  Where can you learn more?  Go to https://www.[employer].net/patiented  Enter T756 in the search box to learn more about \"Eating Healthy Foods: Care Instructions.\"  Current as of: February 28, 2023               Content Version: 13.8    8262-4358 [employer].   Care instructions adapted under license by your healthcare professional. If you have questions about a medical condition or this instruction, always ask your healthcare professional. [employer] disclaims any warranty or liability for your use of this " information.      Body Mass Index: Care Instructions  Overview     Body mass index (BMI) can help you see if your weight is raising your risk for health problems. It uses a formula to compare how much you weigh with how tall you are.  A BMI lower than 18.5 is considered underweight.  A BMI between 18.5 and 24.9 is considered healthy.  A BMI between 25 and 29.9 is considered overweight. A BMI of 30 or higher is considered obese.  If your BMI is in the normal range, it means that you have a lower risk for weight-related health problems. If your BMI is in the overweight or obese range, you may be at increased risk for weight-related health problems, such as high blood pressure, heart disease, stroke, arthritis or joint pain, and diabetes. If your BMI is in the underweight range, you may be at increased risk for health problems such as fatigue, lower protection (immunity) against illness, muscle loss, bone loss, hair loss, and hormone problems.  BMI is just one measure of your risk for weight-related health problems. You may be at higher risk for health problems if you are not active, you eat an unhealthy diet, or you drink too much alcohol or use tobacco products.  Follow-up care is a key part of your treatment and safety. Be sure to make and go to all appointments, and call your doctor if you are having problems. It's also a good idea to know your test results and keep a list of the medicines you take.  How can you care for yourself at home?  Practice healthy eating habits. This includes eating plenty of fruits, vegetables, whole grains, lean protein, and low-fat dairy.  If your doctor recommends it, get more exercise. Walking is a good choice. Bit by bit, increase the amount you walk every day. Try for at least 30 minutes on most days of the week.  Do not smoke. Smoking can increase your risk for health problems. If you need help quitting, talk to your doctor about stop-smoking programs and medicines. These can  "increase your chances of quitting for good.  Limit alcohol to 2 drinks a day for men and 1 drink a day for women. Too much alcohol can cause health problems.  If you have a BMI higher than 25  Your doctor may do other tests to check your risk for weight-related health problems. This may include measuring the distance around your waist. A waist measurement of more than 40 inches in men or 35 inches in women can increase the risk of weight-related health problems.  Talk with your doctor about steps you can take to stay healthy or improve your health. You may need to make lifestyle changes to lose weight and stay healthy, such as changing your diet and getting regular exercise.  If you have a BMI lower than 18.5  Your doctor may do other tests to check your risk for health problems.  Talk with your doctor about steps you can take to stay healthy or improve your health. You may need to make lifestyle changes to gain or maintain weight and stay healthy, such as getting more healthy foods in your diet and doing exercises to build muscle.  Where can you learn more?  Go to https://www.[employer].net/patiented  Enter S176 in the search box to learn more about \"Body Mass Index: Care Instructions.\"  Current as of: May 14, 2023               Content Version: 13.8    3199-3101 [employer].   Care instructions adapted under license by your healthcare professional. If you have questions about a medical condition or this instruction, always ask your healthcare professional. [employer] disclaims any warranty or liability for your use of this information.      Hearing Loss: Care Instructions  Overview     Hearing loss is a sudden or slow decrease in how well you hear. It can range from slight to profound. Permanent hearing loss can occur with aging. It also can happen when you are exposed long-term to loud noise. Examples include listening to loud music, riding motorcycles, or being around other loud " machines.  Hearing loss can affect your work and home life. It can make you feel lonely or depressed. You may feel that you have lost your independence. But hearing aids and other devices can help you hear better and feel connected to others.  Follow-up care is a key part of your treatment and safety. Be sure to make and go to all appointments, and call your doctor if you are having problems. It's also a good idea to know your test results and keep a list of the medicines you take.  How can you care for yourself at home?  Avoid loud noises whenever possible. This helps keep your hearing from getting worse.  Always wear hearing protection around loud noises.  Wear a hearing aid as directed.  A professional can help you pick a hearing aid that will work best for you.  You can also get hearing aids over the counter for mild to moderate hearing loss.  Have hearing tests as your doctor suggests. They can show whether your hearing has changed. Your hearing aid may need to be adjusted.  Use other devices as needed. These may include:  Telephone amplifiers and hearing aids that can connect to a television, stereo, radio, or microphone.  Devices that use lights or vibrations. These alert you to the doorbell, a ringing telephone, or a baby monitor.  Television closed-captioning. This shows the words at the bottom of the screen. Most new TVs can do this.  TTY (text telephone). This lets you type messages back and forth on the telephone instead of talking or listening. These devices are also called TDD. When messages are typed on the keyboard, they are sent over the phone line to a receiving TTY. The message is shown on a monitor.  Use text messaging, social media, and email if it is hard for you to communicate by telephone.  Try to learn a listening technique called speechreading. It is not lipreading. You pay attention to people's gestures, expressions, posture, and tone of voice. These clues can help you understand what a  "person is saying. Face the person you are talking to, and have them face you. Make sure the lighting is good. You need to see the other person's face clearly.  Think about counseling if you need help to adjust to your hearing loss.  When should you call for help?  Watch closely for changes in your health, and be sure to contact your doctor if:    You think your hearing is getting worse.     You have new symptoms, such as dizziness or nausea.   Where can you learn more?  Go to https://www.[employer].net/patiented  Enter R798 in the search box to learn more about \"Hearing Loss: Care Instructions.\"  Current as of: February 28, 2023               Content Version: 13.8    9114-6598 [employer].   Care instructions adapted under license by your healthcare professional. If you have questions about a medical condition or this instruction, always ask your healthcare professional. [employer] disclaims any warranty or liability for your use of this information.      Preventive Care Advice   This is general advice given by our system to help you stay healthy. However, your care team may have specific advice just for you. Please talk to your care team about your preventive care needs.  Nutrition  Eat 5 or more servings of fruits and vegetables each day.  Try wheat bread, brown rice and whole grain pasta (instead of white bread, rice, and pasta).  Get enough calcium and vitamin D. Check the label on foods and aim for 100% of the RDA (recommended daily allowance).  Lifestyle  Exercise at least 150 minutes each week  (30 minutes a day, 5 days a week).  Do muscle strengthening activities 2 days a week. These help control your weight and prevent disease.  No smoking.  Wear sunscreen to prevent skin cancer.  Have a dental exam and cleaning every 6 months.  Yearly exams  See your health care team every year to talk about:  Any changes in your health.  Any medicines your care team has prescribed.  Preventive " care, family planning, and ways to prevent chronic diseases.  Shots (vaccines)   HPV shots (up to age 26), if you've never had them before.  Hepatitis B shots (up to age 59), if you've never had them before.  COVID-19 shot: Get this shot when it's due.  Flu shot: Get a flu shot every year.  Tetanus shot: Get a tetanus shot every 10 years.  Pneumococcal, hepatitis A, and RSV shots: Ask your care team if you need these based on your risk.  Shingles shot (for age 50 and up)  General health tests  Diabetes screening:  Starting at age 35, Get screened for diabetes at least every 3 years.  If you are younger than age 35, ask your care team if you should be screened for diabetes.  Cholesterol test: At age 39, start having a cholesterol test every 5 years, or more often if advised.  Bone density scan (DEXA): At age 50, ask your care team if you should have this scan for osteoporosis (brittle bones).  Hepatitis C: Get tested at least once in your life.  STIs (sexually transmitted infections)  Before age 24: Ask your care team if you should be screened for STIs.  After age 24: Get screened for STIs if you're at risk. You are at risk for STIs (including HIV) if:  You are sexually active with more than one person.  You don't use condoms every time.  You or a partner was diagnosed with a sexually transmitted infection.  If you are at risk for HIV, ask about PrEP medicine to prevent HIV.  Get tested for HIV at least once in your life, whether you are at risk for HIV or not.  Cancer screening tests  Cervical cancer screening: If you have a cervix, begin getting regular cervical cancer screening tests starting at age 21.  Breast cancer scan (mammogram): If you've ever had breasts, begin having regular mammograms starting at age 40. This is a scan to check for breast cancer.  Colon cancer screening: It is important to start screening for colon cancer at age 45.  Have a colonoscopy test every 10 years (or more often if you're at  risk) Or, ask your provider about stool tests like a FIT test every year or Cologuard test every 3 years.  To learn more about your testing options, visit:   https://[email or web address]/[email or web address].  For help making a decision, visit:   https://bit.LUBB-TEX/ja61888.  Prostate cancer screening test: If you have a prostate, ask your care team if a prostate cancer screening test (PSA) at age 55 is right for you.  Lung cancer screening: If you are a current or former smoker ages 50 to 80, ask your care team if ongoing lung cancer screenings are right for you.  For informational purposes only. Not to replace the advice of your health care provider. Copyright   2023 [facility]. All rights reserved. Clinically reviewed by the [facility] Transitions Program. Loco[employer] [id number] - REV 01/24.

## 2024-02-01 NOTE — PROGRESS NOTES
Preventive Care Visit  [facility]  JULIANE DEJESUS MD, Internal Medicine  Feb 1, 2024    Assessment & Plan     1. Encounter for Medicare annual wellness exam  Patient lives an active and healthy lifestyle.  Continue same.  He is up-to-date on his immunizations except for his RSV vaccine which we discussed today.  See below.    2. Infection due to 2019 novel coronavirus  He is made a good recovery and did well with Paxlovid.  He can resume his statin at this time.    3. S/P TURP  He is pleased with the results.  Check urinalysis.  He will be following up with urologist who does his yearly PSAs.  - UA Macroscopic with reflex to Microscopic and Culture - Lab Collect; Future  - UA Macroscopic with reflex to Microscopic and Culture - Lab Collect    4. GERD with stricture  No dysphagia in the esophageal region.  No symptoms of reflux.  Continue his daily PPI.  - CBC with platelets; Future  - CBC with platelets    5. Dyslipidemia  Resume statin today.  - Lipid panel reflex to direct LDL Non-fasting; Future  - Comprehensive metabolic panel (BMP + Alb, Alk Phos, ALT, AST, Total. Bili, TP); Future  - Lipid panel reflex to direct LDL Non-fasting  - Comprehensive metabolic panel (BMP + Alb, Alk Phos, ALT, AST, Total. Bili, TP)    6. Need for vaccination against respiratory syncytial virus    - respiratory syncytial virus vaccine, bivalent (ABRYSVO) injection; Inject 0.5 mLs into the muscle once for 1 dose  Dispense: 1 each; Refill: 0    7. Oropharyngeal dysphagia  I am concerned about the persistent recurrent sore throat and oropharyngeal dysphagia.  He is going to be seeing the ENT in the near future for his ear so I have asked that they address this issue as well.  - Adult ENT  Referral; Future    8. Sore throat  As above.  - Adult ENT  Referral; Future           BMI  Estimated body mass index is 30.86 kg/m  as calculated from the following:    Height as of this encounter: 1.753 m (5'  "9\").    Weight as of this encounter: 94.8 kg (209 lb).       Counseling  Appropriate preventive services were discussed with this patient, including applicable screening as appropriate for fall prevention, nutrition, physical activity, Tobacco-use cessation, weight loss and cognition.  Checklist reviewing preventive services available has been given to the patient.  Updated plan of care.  Patient reported difficulty with activities of daily living were addressed today.The patient was provided with written information regarding signs of hearing loss.     Patient has been advised of split billing requirements and indicates understanding: Yes        Subjective   Khadar is a 78 year old, presenting for the following:  Wellness Visit (Fasting for labs /), Ear Fullness (Check ears for wax ), and Throat Problem (Intermittent is mild trouble swallowing & dry mouth - likely due to using CPAP )        2/1/2024     8:28 AM   Additional Questions   Roomed by JAILENE Jones   Accompanied by Self         Health Care Directive  Patient has a Health Care Directive on file  Advance care planning document is on file and is current.  Healthy Habits:     In general, how would you rate your overall health?  Excellent    Frequency of exercise:  2-3 days/week    Duration of exercise:  30-45 minutes    Do you usually eat at least 4 servings of fruit and vegetables a day, include whole grains    & fiber and avoid regularly eating high fat or \"junk\" foods?  Yes    Taking medications regularly:  Yes    Medication side effects:  Not applicable    Ability to successfully perform activities of daily living:  No assistance needed    Home Safety:  No safety concerns identified    Hearing Impairment:  Difficulty following a conversation in a noisy restaurant or crowded room, feel that people are mumbling or not speaking clearly, difficult to understand a speaker at a public meeting or Voodoo service, need to ask people to speak up or repeat themselves, " find that men's voices are easier to understand than woman's and difficulty understanding soft or whispered speech    In the past 6 months, have you been bothered by leaking of urine?  No    In general, how would you rate your overall mental or emotional health?  Excellent    Additional concerns today:  Yes    Khadar comes in today for annual wellness.  Reports has been doing well although he just had COVID.  Just finishing up Paxlovid.  Has had no rebound symptoms.  He has tested negative twice.  He has sleep apnea and uses CPAP.  He gets very dry throat.  Intermittently he has difficulty swallowing things like crackers but it is in the back of the throat.  That is not associated with the CPAP use.  Also notes some soreness in the back of the throat at times.  He is taking his reflux medication religiously.  He denies other somatic concerns for me.  Remains very active with exercise.  Sees his grandkids and their sporting events regularly.  No travel this year.      Current Outpatient Medications   Medication    omeprazole (PRILOSEC) 20 MG DR capsule    respiratory syncytial virus vaccine, bivalent (ABRYSVO) injection    atorvastatin (LIPITOR) 10 MG tablet    Calcium Citrate-Vitamin D (CALCIUM CITRATE +D PO)    Multiple Vitamins-Minerals (MULTIVITAMIN ADULT PO)     No current facility-administered medications for this visit.         1/25/2024   Social Factors   Worry food won't last until get money to buy more No   Food not last or not have enough money for food? No   Do you have housing?  Yes   Are you worried about losing your housing? No   Lack of transportation? No   Unable to get utilities (heat,electricity)? No         1/25/2024   Fall Risk   Fallen 2 or more times in the past year? No        Today's PHQ-2 Score:       2/1/2024     8:20 AM   PHQ-2 ( 1999 Pfizer)   Q1: Little interest or pleasure in doing things Not at all   Q2: Feeling down, depressed or hopeless Not at all   PHQ-2 Score 0           1/25/2024    Substance Use   Alcohol more than 3/day or more than 7/wk No     Social History     Tobacco Use    Smoking status: Never    Smokeless tobacco: Never   Substance Use Topics    Alcohol use: No     Comment: Alcoholic Drinks/day: social 3 times per month    Drug use: No       Do you have a current opioid prescription? No  Do you use any other controlled substances or medications that are not prescribed by a provider? None    The 10-year ASCVD risk score (Jamie VARGAS, et al., 2019) is: 24%    Values used to calculate the score:      Age: 78 years      Sex: Male      Is Non- : No      Diabetic: No      Tobacco smoker: No      Systolic Blood Pressure: 122 mmHg      Is BP treated: No      HDL Cholesterol: 40 mg/dL      Total Cholesterol: 150 mg/dL            Reviewed and updated as needed this visit by Provider                      Current providers sharing in care for this patient include:  Patient Care Team:  Antonio Hawley MD as PCP - General (Internal Medicine)  David Mathews DO as Assigned Sleep Provider  Black Carlos MD as MD  Antonio Hawley MD as Assigned PCP  Nando Velasquez MD as MD (Otolaryngology)  Melecio Casillas DPM as Assigned Surgical Provider    The following health maintenance items are reviewed in Epic and correct as of today:  Health Maintenance   Topic Date Due    RSV VACCINE (Pregnancy & 60+) (1 - 1-dose 60+ series) Never done    LIPID  01/31/2024    ANNUAL REVIEW OF HM ORDERS  01/31/2024    MEDICARE ANNUAL WELLNESS VISIT  01/31/2024    FALL RISK ASSESSMENT  02/01/2025    GLUCOSE  07/06/2026    ADVANCE CARE PLANNING  01/31/2028    DTAP/TDAP/TD IMMUNIZATION (3 - Td or Tdap) 11/06/2029    COLORECTAL CANCER SCREENING  06/09/2030    HEPATITIS C SCREENING  Completed    PHQ-2 (once per calendar year)  Completed    INFLUENZA VACCINE  Completed    Pneumococcal Vaccine: 65+ Years  Completed    ZOSTER IMMUNIZATION  Completed    COVID-19 Vaccine  Completed    IPV  "IMMUNIZATION  Aged Out    HPV IMMUNIZATION  Aged Out    MENINGITIS IMMUNIZATION  Aged Out    RSV MONOCLONAL ANTIBODY  Aged Out     Review of Systems   Constitutional:  Negative for chills and fever.   HENT:  Positive for sore throat and trouble swallowing. Negative for congestion, ear pain and hearing loss.    Eyes:  Negative for pain and visual disturbance.   Respiratory:  Negative for cough and shortness of breath.    Cardiovascular:  Negative for chest pain and palpitations.   Gastrointestinal:  Negative for abdominal pain, constipation, diarrhea and nausea.   Genitourinary:  Negative for dysuria, frequency, genital sores, hematuria, penile discharge and urgency.   Musculoskeletal:  Negative for arthralgias, joint swelling and myalgias.   Skin:  Negative for rash.   Neurological:  Negative for dizziness, weakness and headaches.   Psychiatric/Behavioral:  The patient is not nervous/anxious.           Objective    Exam  /64 (BP Location: Left arm, Patient Position: Sitting, Cuff Size: Adult Regular)   Pulse 70   Temp 97.9  F (36.6  C) (Tympanic)   Resp 14   Ht 1.753 m (5' 9\")   Wt 94.8 kg (209 lb)   SpO2 97%   BMI 30.86 kg/m     Estimated body mass index is 30.86 kg/m  as calculated from the following:    Height as of this encounter: 1.753 m (5' 9\").    Weight as of this encounter: 94.8 kg (209 lb).  Physical Exam  GENERAL: alert and no distress  EYES: Eyes grossly normal to inspection, PERRL and conjunctivae and sclerae normal  HENT: ear canals and TM's normal, nose and mouth without ulcers or lesions  NECK: no adenopathy, no asymmetry, masses, or scars  RESP: lungs clear to auscultation - no rales, rhonchi or wheezes  CV: regular rate and rhythm, normal S1 S2, no S3 or S4, no murmur, click or rub, no peripheral edema  ABDOMEN: soft, nontender, no hepatosplenomegaly, no masses and bowel sounds normal  MS: no gross musculoskeletal defects noted, no edema  SKIN: no suspicious lesions or rashes  NEURO: " Normal strength and tone, mentation intact and speech normal  PSYCH: mentation appears normal, affect normal/bright        2/1/2024   Mini Cog   Clock Draw Score 2 Normal   3 Item Recall 3 objects recalled   Mini Cog Total Score 5            Signed Electronically by: JULIANE DEJESUS MD

## 2024-02-05 ENCOUNTER — NURSE TRIAGE (OUTPATIENT)
Dept: NURSING | Facility: CLINIC | Age: 79
End: 2024-02-05
Payer: COMMERCIAL

## 2024-02-05 NOTE — TELEPHONE ENCOUNTER
" Nurse Triage SBAR    Is this a 2nd Level Triage? YES, LICENSED PRACTITIONER REVIEW IS REQUIRED    Situation: Patient calling with sinus drainage and congestion.  Consent: not needed    Background: Pt states that about a week or so ago he noted coughing up a \"rubbery disc\" type substance. Pt states that is round and feels rubbery by touch, it is about a 1/2\" in diameter. Pt states that this has happened ~3 times. He had COVID at the end of Jan and completed a course of Paxlovid. Pt states that his Sxs resolved although he continued to have sinus drainage and congestion. He saw PCP for PE 2/1 and this had only happened maybe once prior to that visit so pt did not think to mention it but now that it has happen more he is concerned about further infx or complications.    Assessment: Pt denies chest pan, SOB, fever, coughing up blood or blood tinged sputum, sinus pain or pressure. He continues to have sinus drainage and congestion and states he is also producing \"normal\" looking drainage and sputum. .    Protocol Recommended Disposition:   Discuss With PCP And Callback By Nurse Within 1 Hour    Recommendation: Advised patient to  wait for call back from care team after discussing with PCP . Reviewed concerning symptoms and when to call back.     Routed to provider please call pt to advise @[phone], ok to  dt msg    Does the patient meet one of the following criteria for ADS visit consideration? 16+ years old, with an [facility] PCP     TIP  Providers, please consider if this condition is appropriate for management at one of our Acute and Diagnostic Services sites.     If patient is a good candidate, please use dotphrase <dot>triageresponse and select Refer to ADS to document.         GEORGIE ASHLEY RN [facility] 2/5/2024 10:27 AM    Reason for Disposition   PERSISTING SYMPTOMS OF COVID-19 and NEW symptom and could be serious    Additional Information   Negative: SEVERE weakness (e.g., can't stand or can " barely walk) and new-onset or WORSE   Negative: Difficult to awaken or acting confused (e.g., disoriented, slurred speech)   Negative: Bluish (or gray) lips or face now   Negative: Sounds like a life-threatening emergency to the triager   Negative: Typical COVID-19 symptoms and lasting less than 3 weeks   Negative: Chest pain, pressure, or tightness and new-onset or worsening   Negative: Fever and new-onset or worsening   Negative: MODERATE difficulty breathing (e.g., speaks in phrases, SOB even at rest, pulse 100-120) and new-onset or WORSE   Negative: MODERATE difficulty breathing and oxygen level (e.g., pulse oximetry) 91 to 94%   Negative: Oxygen level (e.g., pulse oximetry) 90% or lower   Negative: MODERATE difficulty breathing (e.g., speaks in phrases, SOB even at rest, pulse 100-120)   Negative: Drinking very little and dehydration suspected (e.g., no urine > 12 hours, very dry mouth, very lightheaded)   Negative: Patient sounds very sick or weak to the triager   Negative: MILD difficulty breathing (e.g., minimal/no SOB at rest, SOB with walking, pulse <100) and new-onset   Negative: Oxygen level (e.g., pulse oximetry) 91 to 94%    Protocols used: Coronavirus (COVID-19) Persisting Symptoms Follow-up Call-[facility]

## 2024-02-05 NOTE — TELEPHONE ENCOUNTER
Suspect just residual from his COVID.  Sounds like very thick phlegm.  Drink plenty of fluids and could try something like Mucinex to thin up the secretions.  If he is getting increasing shortness of breath though he will need to be reevaluated in person.

## 2024-02-08 NOTE — PROGRESS NOTES
History of Present Illness - Rich Musa is a very pleasant 79 year old male here to see me for the first time for swallowing problems.    He tells me that he has had issues with dry throat.  He wears CPAP and also has known GERD and takes omeprazole.  He uses a a full face mask.  But over time his throat has become more sensitive over the last few years with a burning sensation that comes and goes.  The dryness has also made his voice hoarse as well.    Otherwise no previous ENT surgery, but did have a Shatzki's ring back in the 1980's and then started to take omeprazole at that time and has not needed a redo.      Past Medical History -   Patient Active Problem List   Diagnosis    Dyslipidemia    GERD with stricture    BRYN on CPAP    Seborrheic Dermatitis    Seborrheic Keratosis    Nephrolithiasis    Benign prostatic hyperplasia with urinary hesitancy    S/P TURP    Peripheral polyneuropathy       Current Medications -   Current Outpatient Medications:     atorvastatin (LIPITOR) 10 MG tablet, Take 1 tablet (10 mg) by mouth daily (Patient not taking: Reported on 2/1/2024), Disp: 90 tablet, Rfl: 3    Calcium Citrate-Vitamin D (CALCIUM CITRATE +D PO), Take 1 tablet by mouth 2 times daily (Patient not taking: Reported on 2/1/2024), Disp: , Rfl:     Multiple Vitamins-Minerals (MULTIVITAMIN ADULT PO), Take 1 tablet by mouth daily (Patient not taking: Reported on 2/1/2024), Disp: , Rfl:     omeprazole (PRILOSEC) 20 MG DR capsule, TAKE 1 CAPSULE (20 MG) BY MOUTH 2 TIMES DAILY AS NEEDED, Disp: 180 capsule, Rfl: 0    Allergies -   Allergies   Allergen Reactions    Sulfa Antibiotics Rash       Social History -   Social History     Socioeconomic History    Marital status:    Tobacco Use    Smoking status: Never    Smokeless tobacco: Never   Substance and Sexual Activity    Alcohol use: No     Comment: Alcoholic Drinks/day: social 3 times per month    Drug use: No     Social Determinants of Health     Financial  "Resource Strain: Low Risk  (2024)    Financial Resource Strain     Within the past 12 months, have you or your family members you live with been unable to get utilities (heat, electricity) when it was really needed?: No   Food Insecurity: Low Risk  (2024)    Food Insecurity     Within the past 12 months, did you worry that your food would run out before you got money to buy more?: No     Within the past 12 months, did the food you bought just not last and you didn t have money to get more?: No   Transportation Needs: Low Risk  (2024)    Transportation Needs     Within the past 12 months, has lack of transportation kept you from medical appointments, getting your medicines, non-medical meetings or appointments, work, or from getting things that you need?: No   Interpersonal Safety: Low Risk  (2024)    Interpersonal Safety     Do you feel physically and emotionally safe where you currently live?: Yes     Within the past 12 months, have you been hit, slapped, kicked or otherwise physically hurt by someone?: No     Within the past 12 months, have you been humiliated or emotionally abused in other ways by your partner or ex-partner?: No   Housing Stability: Low Risk  (2024)    Housing Stability     Do you have housing? : Yes     Are you worried about losing your housing?: No       Family History -   Family History   Problem Relation Age of Onset    Diabetes Mother     Breast Cancer Mother     Diabetes Father     Kidney Disease Father          age 52 nephropathy    Diabetes Sister     Cancer Brother         bladder       Review of Systems - As per HPI and PMHx, otherwise 10+ system review of the head and neck, and general constitution is negative.    Physical Exam  /88   Pulse 73   Resp 16   Ht 1.753 m (5' 9\")   Wt 94.8 kg (209 lb)   SpO2 95%   BMI 30.86 kg/m        General - The patient is well nourished and well developed, and appears to have good nutritional status.  Alert and " oriented to person and place, answers questions and cooperates with examination appropriately.   Head and Face - Normocephalic and atraumatic, with no gross asymmetry noted of the contour of the facial features.  The facial nerve is intact, with strong symmetric movements.  Voice and Breathing - The patient was breathing comfortably without the use of accessory muscles. There was no wheezing, stridor, or stertor.  The patients voice was clear and strong, and had appropriate pitch and quality.  Ears - The tympanic membranes are normal in appearance, bony landmarks are intact.  No retraction, perforation, or masses.  No fluid or purulence was seen in the external canal or the middle ear. No evidence of infection of the middle ear or external canal, cerumen was normal in appearance.  Eyes - Extraocular movements intact, and the pupils were reactive to light.  Sclera were not icteric or injected, conjunctiva were pink and moist.  Mouth - Examination of the oral cavity showed pink, healthy oral mucosa. No lesions or ulcerations noted.  The tongue was mobile and midline, and the dentition were in good condition.    Throat - The walls of the oropharynx were smooth, pink, moist, symmetric, and had no lesions or ulcerations.  The tonsillar pillars and soft palate were symmetric.  The uvula was midline on elevation.    Neck - Normal midline excursion of the laryngotracheal complex during swallowing.  Full range of motion on passive movement.  Palpation of the occipital, submental, submandibular, internal jugular chain, and supraclavicular nodes did not demonstrate any abnormal lymph nodes or masses.  The carotid pulse was palpable bilaterally.  Palpation of the thyroid was soft and smooth, with no nodules or goiter appreciated.  The trachea was mobile and midline.  Nose - External contour is symmetric, no gross deflection or scars.  Nasal mucosa is pink and moist with no abnormal mucus.  The septum was midline and  non-obstructive, turbinates of normal size and position.  No polyps, masses, or purulence noted on examination.    Flexible Endoscopy -    The patient was informed that based on the history and symptoms, direct visualization with an endoscopy procedure was needed.  They understood and we proceeded with a fiberoptic examination.  First I sprayed both sides of the nose with a mixture of lidocaine and neosynephrine.  I then passed the scope through the nasal cavity.  Color photographs were taken for the permanent medical record.  The nasal cavity was unremarkable.  The nasopharynx was notable for a patch of sticky purulent material in the midline over the adenoid pad, and there was a stream of purulent material streaming down back of the throat.  The Eustachian tube openings were unobstructed.      Going further down I had a clear view of the base of tongue, which had normal appearing lingual tonsillar tissue.  The base of tongue was free of lesions, and the vallecula was open.  The epiglottis was smooth and mucosally covered.  The supraglottic larynx was then clearly visualized.  The vocal cords moved smoothly and symmetrically, they were slightly edematous but pearly white and no lesions were seen.  I did note a significant amount of edema and redundant mucosa in the interarytenoid soft tissues and posterior surface of the larynx.  The pyriform sinuses were open, and the limited view of the postcricoid region did not show any erosive or mass lesions.      A/P - Rich Musa is a 78 year old male  (K21.9) Gastroesophageal reflux disease without esophagitis  (primary encounter diagnosis)  (R13.12) Oropharyngeal dysphagia  (J02.9) Sore throat  (J35.02) Adenoiditis    My first thought is that there is adenoiditis that was clearly seen on endoscopy.  I have done cultures and we will await the results to choose the ideal antibiotics.    But fi that fails, my next thought would be that his GERD has advanced and we need  to add a seconda medication.

## 2024-02-15 ENCOUNTER — OFFICE VISIT (OUTPATIENT)
Dept: OTOLARYNGOLOGY | Facility: CLINIC | Age: 79
End: 2024-02-15
Payer: COMMERCIAL

## 2024-02-15 VITALS
RESPIRATION RATE: 16 BRPM | HEART RATE: 73 BPM | DIASTOLIC BLOOD PRESSURE: 88 MMHG | SYSTOLIC BLOOD PRESSURE: 139 MMHG | HEIGHT: 69 IN | OXYGEN SATURATION: 95 % | WEIGHT: 209 LBS | BODY MASS INDEX: 30.96 KG/M2

## 2024-02-15 DIAGNOSIS — K21.9 GASTROESOPHAGEAL REFLUX DISEASE WITHOUT ESOPHAGITIS: Primary | ICD-10-CM

## 2024-02-15 DIAGNOSIS — J02.9 SORE THROAT: ICD-10-CM

## 2024-02-15 DIAGNOSIS — J35.02 ADENOIDITIS: ICD-10-CM

## 2024-02-15 DIAGNOSIS — R13.12 OROPHARYNGEAL DYSPHAGIA: ICD-10-CM

## 2024-02-15 PROCEDURE — 87077 CULTURE AEROBIC IDENTIFY: CPT | Performed by: OTOLARYNGOLOGY

## 2024-02-15 PROCEDURE — 87186 SC STD MICRODIL/AGAR DIL: CPT | Performed by: OTOLARYNGOLOGY

## 2024-02-15 PROCEDURE — 99213 OFFICE O/P EST LOW 20 MIN: CPT | Mod: 25 | Performed by: OTOLARYNGOLOGY

## 2024-02-15 PROCEDURE — 87147 CULTURE TYPE IMMUNOLOGIC: CPT | Performed by: OTOLARYNGOLOGY

## 2024-02-15 PROCEDURE — 31575 DIAGNOSTIC LARYNGOSCOPY: CPT | Performed by: OTOLARYNGOLOGY

## 2024-02-15 PROCEDURE — 87070 CULTURE OTHR SPECIMN AEROBIC: CPT | Performed by: OTOLARYNGOLOGY

## 2024-02-15 ASSESSMENT — PAIN SCALES - GENERAL: PAINLEVEL: NO PAIN (0)

## 2024-02-15 NOTE — LETTER
2/15/2024         RE: Rich Musa  [address] [facility] [address]        Dear Colleague,    Thank you for referring your patient, Rich Musa, to the [facility]. Please see a copy of my visit note below.    History of Present Illness - Rich Musa is a very pleasant 79 year old male here to see me for the first time for swallowing problems.    He tells me that he has had issues with dry throat.  He wears CPAP and also has known GERD and takes omeprazole.  He uses a a full face mask.  But over time his throat has become more sensitive over the last few years with a burning sensation that comes and goes.  The dryness has also made his voice hoarse as well.    Otherwise no previous ENT surgery, but did have a Shatzki's ring back in the 1980's and then started to take omeprazole at that time and has not needed a redo.      Past Medical History -   Patient Active Problem List   Diagnosis     Dyslipidemia     GERD with stricture     BRYN on CPAP     Seborrheic Dermatitis     Seborrheic Keratosis     Nephrolithiasis     Benign prostatic hyperplasia with urinary hesitancy     S/P TURP     Peripheral polyneuropathy       Current Medications -   Current Outpatient Medications:      atorvastatin (LIPITOR) 10 MG tablet, Take 1 tablet (10 mg) by mouth daily (Patient not taking: Reported on 2/1/2024), Disp: 90 tablet, Rfl: 3     Calcium Citrate-Vitamin D (CALCIUM CITRATE +D PO), Take 1 tablet by mouth 2 times daily (Patient not taking: Reported on 2/1/2024), Disp: , Rfl:      Multiple Vitamins-Minerals (MULTIVITAMIN ADULT PO), Take 1 tablet by mouth daily (Patient not taking: Reported on 2/1/2024), Disp: , Rfl:      omeprazole (PRILOSEC) 20 MG DR capsule, TAKE 1 CAPSULE (20 MG) BY MOUTH 2 TIMES DAILY AS NEEDED, Disp: 180 capsule, Rfl: 0    Allergies -   Allergies   Allergen Reactions     Sulfa Antibiotics Rash       Social History -   Social History     Socioeconomic History      Marital status:    Tobacco Use     Smoking status: Never     Smokeless tobacco: Never   Substance and Sexual Activity     Alcohol use: No     Comment: Alcoholic Drinks/day: social 3 times per month     Drug use: No     Social Determinants of Health     Financial Resource Strain: Low Risk  (2024)    Financial Resource Strain      Within the past 12 months, have you or your family members you live with been unable to get utilities (heat, electricity) when it was really needed?: No   Food Insecurity: Low Risk  (2024)    Food Insecurity      Within the past 12 months, did you worry that your food would run out before you got money to buy more?: No      Within the past 12 months, did the food you bought just not last and you didn t have money to get more?: No   Transportation Needs: Low Risk  (2024)    Transportation Needs      Within the past 12 months, has lack of transportation kept you from medical appointments, getting your medicines, non-medical meetings or appointments, work, or from getting things that you need?: No   Interpersonal Safety: Low Risk  (2024)    Interpersonal Safety      Do you feel physically and emotionally safe where you currently live?: Yes      Within the past 12 months, have you been hit, slapped, kicked or otherwise physically hurt by someone?: No      Within the past 12 months, have you been humiliated or emotionally abused in other ways by your partner or ex-partner?: No   Housing Stability: Low Risk  (2024)    Housing Stability      Do you have housing? : Yes      Are you worried about losing your housing?: No       Family History -   Family History   Problem Relation Age of Onset     Diabetes Mother      Breast Cancer Mother      Diabetes Father      Kidney Disease Father          age 52 nephropathy     Diabetes Sister      Cancer Brother         bladder       Review of Systems - As per HPI and PMHx, otherwise 10+ system review of the head and neck, and  "general constitution is negative.    Physical Exam  /88   Pulse 73   Resp 16   Ht 1.753 m (5' 9\")   Wt 94.8 kg (209 lb)   SpO2 95%   BMI 30.86 kg/m        General - The patient is well nourished and well developed, and appears to have good nutritional status.  Alert and oriented to person and place, answers questions and cooperates with examination appropriately.   Head and Face - Normocephalic and atraumatic, with no gross asymmetry noted of the contour of the facial features.  The facial nerve is intact, with strong symmetric movements.  Voice and Breathing - The patient was breathing comfortably without the use of accessory muscles. There was no wheezing, stridor, or stertor.  The patients voice was clear and strong, and had appropriate pitch and quality.  Ears - The tympanic membranes are normal in appearance, bony landmarks are intact.  No retraction, perforation, or masses.  No fluid or purulence was seen in the external canal or the middle ear. No evidence of infection of the middle ear or external canal, cerumen was normal in appearance.  Eyes - Extraocular movements intact, and the pupils were reactive to light.  Sclera were not icteric or injected, conjunctiva were pink and moist.  Mouth - Examination of the oral cavity showed pink, healthy oral mucosa. No lesions or ulcerations noted.  The tongue was mobile and midline, and the dentition were in good condition.    Throat - The walls of the oropharynx were smooth, pink, moist, symmetric, and had no lesions or ulcerations.  The tonsillar pillars and soft palate were symmetric.  The uvula was midline on elevation.    Neck - Normal midline excursion of the laryngotracheal complex during swallowing.  Full range of motion on passive movement.  Palpation of the occipital, submental, submandibular, internal jugular chain, and supraclavicular nodes did not demonstrate any abnormal lymph nodes or masses.  The carotid pulse was palpable bilaterally.  " Palpation of the thyroid was soft and smooth, with no nodules or goiter appreciated.  The trachea was mobile and midline.  Nose - External contour is symmetric, no gross deflection or scars.  Nasal mucosa is pink and moist with no abnormal mucus.  The septum was midline and non-obstructive, turbinates of normal size and position.  No polyps, masses, or purulence noted on examination.    Flexible Endoscopy -    The patient was informed that based on the history and symptoms, direct visualization with an endoscopy procedure was needed.  They understood and we proceeded with a fiberoptic examination.  First I sprayed both sides of the nose with a mixture of lidocaine and neosynephrine.  I then passed the scope through the nasal cavity.  Color photographs were taken for the permanent medical record.  The nasal cavity was unremarkable.  The nasopharynx was notable for a patch of sticky purulent material in the midline over the adenoid pad, and there was a stream of purulent material streaming down back of the throat.  The Eustachian tube openings were unobstructed.      Going further down I had a clear view of the base of tongue, which had normal appearing lingual tonsillar tissue.  The base of tongue was free of lesions, and the vallecula was open.  The epiglottis was smooth and mucosally covered.  The supraglottic larynx was then clearly visualized.  The vocal cords moved smoothly and symmetrically, they were slightly edematous but pearly white and no lesions were seen.  I did note a significant amount of edema and redundant mucosa in the interarytenoid soft tissues and posterior surface of the larynx.  The pyriform sinuses were open, and the limited view of the postcricoid region did not show any erosive or mass lesions.      A/P - Richlawrence Musa is a 78 year old male  (K21.9) Gastroesophageal reflux disease without esophagitis  (primary encounter diagnosis)  (R13.12) Oropharyngeal dysphagia  (J02.9) Sore  throat  (J35.02) Adenoiditis    My first thought is that there is adenoiditis that was clearly seen on endoscopy.  I have done cultures and we will await the results to choose the ideal antibiotics.    But fi that fails, my next thought would be that his GERD has advanced and we need to add a seconda medication.      Again, thank you for allowing me to participate in the care of your patient.        Sincerely,        Nando Velasquez MD

## 2024-02-18 LAB
BACTERIA SPEC CULT: ABNORMAL
BACTERIA SPEC CULT: ABNORMAL

## 2024-02-19 DIAGNOSIS — J35.02 ADENOIDITIS: Primary | ICD-10-CM

## 2024-02-19 DIAGNOSIS — A49.01 STAPH AUREUS INFECTION: ICD-10-CM

## 2024-02-19 RX ORDER — CIPROFLOXACIN 500 MG/1
500 TABLET, FILM COATED ORAL 2 TIMES DAILY
Qty: 28 TABLET | Refills: 1 | Status: SHIPPED | OUTPATIENT
Start: 2024-02-19 | End: 2024-02-24

## 2024-02-19 NOTE — RESULT ENCOUNTER NOTE
Called patient but voicemail is full and could not leave a message.    Cultures show strong growth of staph, and I think the infected adenoid is the issue here.  I will put him on a standard staph decolonization protocol with 14 days of oral antbiotics, and also at the same time 30 days of Mupirocin antibiotic nasal flushes.      Hopefully that will clear everything up

## 2024-02-24 ENCOUNTER — MYC MEDICAL ADVICE (OUTPATIENT)
Dept: OTOLARYNGOLOGY | Facility: CLINIC | Age: 79
End: 2024-02-24
Payer: COMMERCIAL

## 2024-02-24 ENCOUNTER — NURSE TRIAGE (OUTPATIENT)
Dept: NURSING | Facility: CLINIC | Age: 79
End: 2024-02-24
Payer: COMMERCIAL

## 2024-02-24 DIAGNOSIS — J35.02 ADENOIDITIS: Primary | ICD-10-CM

## 2024-02-24 DIAGNOSIS — A49.01 STAPH AUREUS INFECTION: Primary | ICD-10-CM

## 2024-02-24 DIAGNOSIS — J35.02 ADENOIDITIS: ICD-10-CM

## 2024-02-24 NOTE — TELEPHONE ENCOUNTER
Nurse Triage SBAR  Is this a 2nd Level Triage? NO    Situation: Dizziness after taking Cipro.    Background: Per pt he noticed dizziness after taking Ciprofloxacin prescribed by Dr. Velasquez on 02/19/2024, , per pt he has not taken medication today, due to dizziness. Per pt he has completed 4 days of medication so far.    Assessment: Pt denied any acute distress    Protocol Recommended Disposition:   See PCP Within 24 Hours On call for LG Gutierrez MD that ordered medication is paged at 12:06 PM. Dr. Benedict Arriola called back at 12:19 PM; per MD, pt to stop  Ciprofloxacin, have medication noted as allergy and start Augmentin 875-125 MG PO BID X 10 days.    Recommendation: Pt is called back a 12:25 PM,to update on call back from MD: pt is  informed to stop taking Ciprofloxacin,add mediation to his  medication allergies and of new medication ordered;  Augmentin 875-125 MG PO BID X 10 days. Pt's preferred pharmacy is confirmed and new medication is sent. Pt is informed to call  back for any questions or concerns.Pt  verbalized understanding and agreement with plan of care and no further questions at this  time.    At 1:25 PM; Pt called back and verbalized that [facility] pharmacy's electronic system is down and medication has to be called in per pharmacy. At 1:27 PM, [facility] for pt is called to call medication in to pharmacy. At 1:47 PM, after phone was not answered after a long hold and RN verified with pharmacy staff (not pharmacist) that system is down and either to wait to speak with a pharmacist  or leave VM with medication details on provider line, Augmentin 875-125 MG PO BID X 10 days details from sig are provided including ordering provider.    Paged to provider    Does the patient meet one of the following criteria for ADS visit consideration? 16+ years old, with an [facility] PCP     TIP  Providers, please consider if this condition is appropriate for management at one of our Acute and Diagnostic Services sites.     If  "patient is a good candidate, please use dotphrase <dot>triageresponse and select Refer to ADS to document.    Reason for Disposition   Taking a medicine that could cause dizziness (e.g., blood pressure medications, diuretics)    Additional Information   Negative: SEVERE difficulty breathing (e.g., struggling for each breath, speaks in single words)   Negative: [1] Difficulty breathing or swallowing AND [2] started suddenly after medicine, an allergic food or bee sting   Negative: Shock suspected (e.g., cold/pale/clammy skin, too weak to stand, low BP, rapid pulse)   Negative: Difficult to awaken or acting confused (e.g., disoriented, slurred speech)   Negative: [1] Weakness (i.e., paralysis, loss of muscle strength) of the face, arm or leg on one side of the body AND [2] sudden onset AND [3] present now   Negative: [1] Numbness (i.e., loss of sensation) of the face, arm or leg on one side of the body AND [2] sudden onset AND [3] present now   Negative: [1] Loss of speech or garbled speech AND [2] sudden onset AND [3] present now   Negative: Overdose (accidental or intentional) of medications   Negative: [1] Fainted > 15 minutes ago AND [2] still feels too weak or dizzy to stand   Negative: Heart beating < 50 beats per minute OR > 140 beats per minute   Negative: Difficulty breathing   Negative: SEVERE dizziness (e.g., unable to stand, requires support to walk, feels like passing out now)   Negative: Extra heartbeats, irregular heart beating, or heart is beating very fast  (i.e., \"palpitations\")   Negative: [1] Drinking very little AND [2] dehydration suspected (e.g., no urine > 12 hours, very dry mouth, very lightheaded)   Negative: [1] Weakness (i.e., paralysis, loss of muscle strength) of the face, arm / hand, or leg / foot on one side of the body AND [2] sudden onset AND [3] brief (now gone)   Negative: [1] Numbness (i.e., loss of sensation) of the face, arm / hand, or leg / foot on one side of the body AND [2] " sudden onset AND [3] brief (now gone)   Negative: [1] Loss of speech or garbled speech AND [2] sudden onset AND [3] brief (now gone)   Negative: Loss of vision or double vision  (Exception: Similar to previous migraines.)   Negative: Patient sounds very sick or weak to the triager   Negative: [1] Dizziness caused by heat exposure, sudden standing, or poor fluid intake AND [2] no improvement after 2 hours of rest and fluids   Negative: [1] Fever > 103 F (39.4 C) AND [2] not able to get the fever down using Fever Care Advice   Negative: [1] Fever > 101 F (38.3 C) AND [2] age > 60 years   Negative: [1] Fever > 100.0 F (37.8 C) AND [2] bedridden (e.g., CVA, chronic illness, recovering from surgery)   Negative: [1] Fever > 100.0 F (37.8 C) AND [2] diabetes mellitus or weak immune system (e.g., HIV positive, cancer chemo, splenectomy, organ transplant, chronic steroids)   Negative: [1] MODERATE dizziness (e.g., interferes with normal activities) AND [2] has NOT been evaluated by doctor (or NP/PA) for this  (Exception: Dizziness caused by heat exposure, sudden standing, or poor fluid intake.)   Negative: Fever present > 3 days (72 hours)    Protocols used: Dizziness - [id number]

## 2024-02-26 ENCOUNTER — MYC MEDICAL ADVICE (OUTPATIENT)
Dept: OTOLARYNGOLOGY | Facility: CLINIC | Age: 79
End: 2024-02-26
Payer: COMMERCIAL

## 2024-02-26 RX ORDER — CLARITHROMYCIN 500 MG
500 TABLET ORAL 2 TIMES DAILY
Qty: 20 TABLET | Refills: 1 | Status: SHIPPED | OUTPATIENT
Start: 2024-02-26 | End: 2024-02-28

## 2024-02-26 NOTE — TELEPHONE ENCOUNTER
Patient switched from Cipro to Augmentin on 2/24 related to dizziness (see nurse triage encounter). Patient now complaining of diarrhea with Augmentin. Please advise if patient should continue Augmentin.     KANIKA DanielsN, RN

## 2024-02-28 ENCOUNTER — TELEPHONE (OUTPATIENT)
Dept: OTOLARYNGOLOGY | Facility: CLINIC | Age: 79
End: 2024-02-28
Payer: COMMERCIAL

## 2024-02-28 DIAGNOSIS — E78.5 DYSLIPIDEMIA: ICD-10-CM

## 2024-02-28 RX ORDER — ATORVASTATIN CALCIUM 10 MG/1
10 TABLET, FILM COATED ORAL DAILY
Qty: 90 TABLET | Refills: 3 | Status: SHIPPED | OUTPATIENT
Start: 2024-02-28 | End: 2024-03-01

## 2024-02-28 NOTE — TELEPHONE ENCOUNTER
Writer called in the above medication. He was on Cipro and stopped due to dizziness. He was placed on Agumentin and stopped due to diarrhea. Per Dr. Velasquez, he was prescribed the above medication, however due to e-prescribing errors, writer called this medication into [facility] at [facility].    Writer called and reiterated to patient the above. He states he started the nasal rinse yesterday as well.     Pia MENCHACA RN   [facility]  2/28/2024 3:29 PM

## 2024-03-01 DIAGNOSIS — E78.5 DYSLIPIDEMIA: ICD-10-CM

## 2024-03-01 RX ORDER — ATORVASTATIN CALCIUM 10 MG/1
10 TABLET, FILM COATED ORAL DAILY
Qty: 90 TABLET | Refills: 3 | Status: SHIPPED | OUTPATIENT
Start: 2024-03-01

## 2024-03-14 ENCOUNTER — TRANSFERRED RECORDS (OUTPATIENT)
Dept: HEALTH INFORMATION MANAGEMENT | Facility: CLINIC | Age: 79
End: 2024-03-14
Payer: COMMERCIAL

## 2024-04-19 DIAGNOSIS — G47.33 OSA (OBSTRUCTIVE SLEEP APNEA): Primary | ICD-10-CM

## 2024-04-27 DIAGNOSIS — K21.9 GERD WITH STRICTURE: ICD-10-CM

## 2024-04-27 DIAGNOSIS — K22.2 GERD WITH STRICTURE: ICD-10-CM

## 2024-09-10 ENCOUNTER — TRANSFERRED RECORDS (OUTPATIENT)
Dept: HEALTH INFORMATION MANAGEMENT | Facility: CLINIC | Age: 79
End: 2024-09-10
Payer: COMMERCIAL

## 2024-10-09 ENCOUNTER — OFFICE VISIT (OUTPATIENT)
Dept: SLEEP MEDICINE | Facility: CLINIC | Age: 79
End: 2024-10-09
Payer: COMMERCIAL

## 2024-10-09 VITALS
DIASTOLIC BLOOD PRESSURE: 87 MMHG | SYSTOLIC BLOOD PRESSURE: 137 MMHG | OXYGEN SATURATION: 96 % | HEIGHT: 69 IN | HEART RATE: 62 BPM | WEIGHT: 203 LBS | BODY MASS INDEX: 30.07 KG/M2

## 2024-10-09 DIAGNOSIS — G47.33 OSA (OBSTRUCTIVE SLEEP APNEA): Primary | ICD-10-CM

## 2024-10-09 PROCEDURE — 99214 OFFICE O/P EST MOD 30 MIN: CPT | Performed by: INTERNAL MEDICINE

## 2024-10-09 PROCEDURE — G2211 COMPLEX E/M VISIT ADD ON: HCPCS | Performed by: INTERNAL MEDICINE

## 2024-10-09 ASSESSMENT — SLEEP AND FATIGUE QUESTIONNAIRES
HOW LIKELY ARE YOU TO NOD OFF OR FALL ASLEEP IN A CAR, WHILE STOPPED FOR A FEW MINUTES IN TRAFFIC: WOULD NEVER DOZE
HOW LIKELY ARE YOU TO NOD OFF OR FALL ASLEEP WHILE SITTING AND TALKING TO SOMEONE: WOULD NEVER DOZE
HOW LIKELY ARE YOU TO NOD OFF OR FALL ASLEEP WHILE WATCHING TV: HIGH CHANCE OF DOZING
HOW LIKELY ARE YOU TO NOD OFF OR FALL ASLEEP WHILE SITTING AND READING: SLIGHT CHANCE OF DOZING
HOW LIKELY ARE YOU TO NOD OFF OR FALL ASLEEP WHILE SITTING QUIETLY AFTER LUNCH WITHOUT ALCOHOL: WOULD NEVER DOZE
HOW LIKELY ARE YOU TO NOD OFF OR FALL ASLEEP WHILE LYING DOWN TO REST IN THE AFTERNOON WHEN CIRCUMSTANCES PERMIT: MODERATE CHANCE OF DOZING
HOW LIKELY ARE YOU TO NOD OFF OR FALL ASLEEP WHEN YOU ARE A PASSENGER IN A CAR FOR AN HOUR WITHOUT A BREAK: WOULD NEVER DOZE
HOW LIKELY ARE YOU TO NOD OFF OR FALL ASLEEP WHILE SITTING INACTIVE IN A PUBLIC PLACE: SLIGHT CHANCE OF DOZING

## 2024-10-09 NOTE — NURSING NOTE
2 year appointment reminder message was created and will be sent out 2 - 3 months prior to next appointment due date. [employer]

## 2024-10-09 NOTE — PATIENT INSTRUCTIONS
"SLEEP HYGIENE    Sleep only as much as you need to feel rested and then get out of bed   Keep a regular sleep schedule   Avoid forcing sleep   Exercise regularly for at least 20 minutes, preferably 4 to 5 hours before bedtime   Avoid caffeinated beverages after lunch   Avoid alcohol near bedtime: no \"night cap\"   Avoid smoking, especially in the evening   Do not go to bed hungry   Adjust bedroom environment   Avoid prolonged use of light-emitting screens before bedtime    Deal with your worries before bedtime       "

## 2024-10-09 NOTE — NURSING NOTE
"Chief Complaint   Patient presents with    CPAP Follow Up       Initial BP (!) 144/79   Pulse 62   Ht 1.765 m (5' 9.49\")   Wt 92.1 kg (203 lb)   SpO2 96%   BMI 29.56 kg/m   Estimated body mass index is 29.56 kg/m  as calculated from the following:    Height as of this encounter: 1.765 m (5' 9.49\").    Weight as of this encounter: 92.1 kg (203 lb).    Medication Reconciliation: complete    Neck circumference: 16.5 inches / 42 centimeters.    DME: [facility] Ernesto Alonso CMA on 10/9/2024 at 9:17 AM       "

## 2024-10-09 NOTE — PROGRESS NOTES
Additional 15 minutes on the date of service was spent performing the following:    -Preparing to see the patient  -Obtaining and/or reviewing separately obtained history   -Ordering medications, tests, or procedures   -Documenting clinical information in the electronic or other health record     Thank you for the opportunity to participate in the care of Rich Musa.     He is a 79 year old y/o male patient who comes to the sleep medicine clinic for follow up. The patient was diagnosed with BRYN on 02/02/2005 (AHI=23.5).  The patient states that he still benefits from CPAP therapy and has no complaints.  He would like to apply to get a new CPAP machine with [employer] [email or web address] if he qualifies.     Assessment and Plan:  In summary Rich Musa is a 79 year old year old male who is here for follow up.  1. BRYN (obstructive sleep apnea)  I congratulated patient on his excellent CPAP usage.  I will keep him on same pressure settings for now.  - [facility] DME    Compliance Download data for 30 days:  Compliance: 96.7%  Pressure setting: APAP 13.5-15 CWP  90% pressure: 15 CWP  Leak: Minimal  Residual AHI: 3.4 events per hour  Mask Tolerance: Good  Skin irritation: None  DME: [facility]    Lab reviewed: Discussed with patient.    Cpap Fu Template    Question 10/8/2024  8:48 PM CDT - Filed by Patient   Do you use a CPAP Machine at home? Yes   Overall, on a scale of 0-10 how would you rate your CPAP? 10   Is your mask comfortable? Yes   Is your mask leaking? No   Do you notice snoring with mask on? No   Do you notice gasping arousals with mask on? No   Are you having significant oral or nasal dryness?    Is the pressure setting comfortable? Yes   What type of mask do you use? Full Face Mask   What is your typical bedtime? 10:00pm   How long does it take you to go to sleep on PAP therapy? ?   What time do you typically get out of bed for the day? 4-5 am   How many hours on average per night are you using  "PAP therapy? 5-6   How many hours are you sleeping per night? 5-6   Do you feel well rested in the morning? Yes       ASHU:  ASHU Total Score: 4  Total score - Tell: 7 (10/9/2024  9:13 AM)    Patient Active Problem List   Diagnosis    Dyslipidemia    GERD with stricture    BRYN on CPAP    Seborrheic Dermatitis    Seborrheic Keratosis    Nephrolithiasis    Benign prostatic hyperplasia with urinary hesitancy    S/P TURP    Peripheral polyneuropathy       Past Medical History:   Diagnosis Date    Benign prostatic hyperplasia with urinary hesitancy     BPH (benign prostatic hyperplasia)     Dyslipidemia     GERD with stricture     Nephrolithiasis     BRYN (obstructive sleep apnea)     cpap    Plantar fasciitis     Seborrheic dermatitis     Seborrheic keratosis     Trochanteric bursitis        Past Surgical History:   Procedure Laterality Date    CYSTOSCOPY, TRANSURETHRAL RESECTION (TUR) PROSTATE, COMBINED N/A 11/29/2022    Procedure: CYSTOSCOPY,  BIPOLAR TRANSURETHRAL RESECTION OF PROSTATE;  Surgeon: Shaan Grewal MD;  Location: [facility] KNEE SCOPE, DIAGNOSTIC      Description: Arthroscopy Knee Right;  Recorded: 12/19/2011;       Current Outpatient Medications   Medication Sig Dispense Refill    atorvastatin (LIPITOR) 10 MG tablet Take 1 tablet (10 mg) by mouth daily 90 tablet 3    Calcium Citrate-Vitamin D (CALCIUM CITRATE +D PO) Take 1 tablet by mouth 2 times daily      Multiple Vitamins-Minerals (MULTIVITAMIN ADULT PO) Take 1 tablet by mouth daily      omeprazole (PRILOSEC) 20 MG DR capsule TAKE 1 CAPSULE BY MOUTH TWICE DAILY AS NEEDED 180 capsule 2       Allergies   Allergen Reactions    Ciprofloxacin Dizziness    Sulfa Antibiotics Rash       Physical Exam:  /87   Pulse 62   Ht 1.765 m (5' 9.49\")   Wt 92.1 kg (203 lb)   SpO2 96%   BMI 29.56 kg/m    BMI:Body mass index is 29.56 kg/m .   GEN: NAD,   Head: Normocephalic.  EYES: EOMI  Psych: normal mood, normal " affect    Labs/Studies:      Lab Results   Component Value Date     (H) 02/01/2024    GLC 98 07/06/2023     Lab Results   Component Value Date    HGB 14.7 02/01/2024    HGB 14.8 07/06/2023     Lab Results   Component Value Date    BUN 20.7 02/01/2024    BUN 16.4 07/06/2023    CR 1.07 02/01/2024    CR 1.10 07/06/2023     Lab Results   Component Value Date    AST 27 02/01/2024    AST 25 07/06/2023    ALT 12 02/01/2024    ALT 9 07/06/2023    ALKPHOS 59 02/01/2024    ALKPHOS 51 07/06/2023    BILITOTAL 0.5 02/01/2024    BILITOTAL 1.2 07/06/2023       Recent Labs   Lab Test 02/01/24  0914 07/06/23  0957    150*   POTASSIUM 4.8 4.6   CHLORIDE 104 112*   CO2 26 25   ANIONGAP 12 13   * 98   BUN 20.7 16.4   CR 1.07 1.10   LATISHA 9.4 9.4     I reviewed the efficacy and compliance report from his device. Data summarized on the HPI and the PAP compliance flow sheet.     Patient verbalized understanding of these issues, agrees with the plan and all questions were answered today. Patient was given an opportuntity to voice any other symptoms or concerns not listed above. Patient did not have any other symptoms or concerns.      David Mathews DO  Board Certified in Internal Medicine and Sleep Medicine    (Note created with Dragon voice recognition and unintended spelling errors and word substitutions may occur)     Audio and visual devices were used for this virtual clinic visit with permission from patient.

## 2024-10-16 ENCOUNTER — DOCUMENTATION ONLY (OUTPATIENT)
Dept: SLEEP MEDICINE | Facility: CLINIC | Age: 79
End: 2024-10-16
Payer: COMMERCIAL

## 2024-10-16 DIAGNOSIS — G47.33 OSA (OBSTRUCTIVE SLEEP APNEA): Primary | ICD-10-CM

## 2024-10-16 NOTE — PROGRESS NOTES
Patient was offered choice of vendor and chose [facility].  Patient Rich Musa was set up at [facility] on October 16, 2024. Patient received a Resmed Airsense 11 Pressures were set at  13-15 cm H2O.   Patient s ramp is 6 cm H2O for Auto and FLEX/EPR is EPR, 2.  Patient received a Resmed Mask name: QUATTRO  Full Face mask size Medium, heated tubing and heated humidifier.  Patient has the following compliance requirements: using and visit requirements  Patient has a follow up on TBD with Dr. Mathews.    Jefry Castro

## 2025-01-07 ENCOUNTER — TRANSFERRED RECORDS (OUTPATIENT)
Dept: HEALTH INFORMATION MANAGEMENT | Facility: CLINIC | Age: 80
End: 2025-01-07
Payer: COMMERCIAL

## 2025-01-13 ENCOUNTER — OFFICE VISIT (OUTPATIENT)
Dept: INTERNAL MEDICINE | Facility: CLINIC | Age: 80
End: 2025-01-13
Payer: COMMERCIAL

## 2025-01-13 ENCOUNTER — HOSPITAL ENCOUNTER (OUTPATIENT)
Dept: CT IMAGING | Facility: HOSPITAL | Age: 80
Discharge: HOME | End: 2025-01-13
Attending: INTERNAL MEDICINE | Admitting: INTERNAL MEDICINE
Payer: COMMERCIAL

## 2025-01-13 VITALS
HEART RATE: 76 BPM | SYSTOLIC BLOOD PRESSURE: 122 MMHG | TEMPERATURE: 97.9 F | RESPIRATION RATE: 18 BRPM | OXYGEN SATURATION: 98 % | WEIGHT: 215 LBS | DIASTOLIC BLOOD PRESSURE: 78 MMHG | HEIGHT: 69 IN | BODY MASS INDEX: 31.84 KG/M2

## 2025-01-13 DIAGNOSIS — R05.8 SPUTUM PRODUCTION: ICD-10-CM

## 2025-01-13 DIAGNOSIS — R10.31 ABDOMINAL PAIN, RIGHT LOWER QUADRANT: ICD-10-CM

## 2025-01-13 DIAGNOSIS — Z78.9 HISTORY OF EXCESSIVE CERUMEN: ICD-10-CM

## 2025-01-13 DIAGNOSIS — R10.31 ABDOMINAL PAIN, RIGHT LOWER QUADRANT: Primary | ICD-10-CM

## 2025-01-13 DIAGNOSIS — Z87.09: ICD-10-CM

## 2025-01-13 LAB
ALBUMIN SERPL BCG-MCNC: 4.3 G/DL (ref 3.5–5.2)
ALBUMIN UR-MCNC: NEGATIVE MG/DL
ALP SERPL-CCNC: 59 U/L (ref 40–150)
ALT SERPL W P-5'-P-CCNC: 14 U/L (ref 0–70)
ANION GAP SERPL CALCULATED.3IONS-SCNC: 12 MMOL/L (ref 7–15)
APPEARANCE UR: CLEAR
AST SERPL W P-5'-P-CCNC: 30 U/L (ref 0–45)
BASOPHILS # BLD AUTO: 0.1 10E3/UL (ref 0–0.2)
BASOPHILS NFR BLD AUTO: 1 %
BILIRUB SERPL-MCNC: 0.6 MG/DL
BILIRUB UR QL STRIP: NEGATIVE
BUN SERPL-MCNC: 22.1 MG/DL (ref 8–23)
CALCIUM SERPL-MCNC: 9.4 MG/DL (ref 8.8–10.4)
CHLORIDE SERPL-SCNC: 104 MMOL/L (ref 98–107)
COLOR UR AUTO: YELLOW
CREAT BLD-MCNC: 1.2 MG/DL (ref 0.7–1.3)
CREAT SERPL-MCNC: 1.05 MG/DL (ref 0.67–1.17)
EGFRCR SERPLBLD CKD-EPI 2021: 73 ML/MIN/1.73M2
EGFRCR SERPLBLD CKD-EPI 2021: >60 ML/MIN/1.73M2
EOSINOPHIL # BLD AUTO: 0.2 10E3/UL (ref 0–0.7)
EOSINOPHIL NFR BLD AUTO: 3 %
ERYTHROCYTE [DISTWIDTH] IN BLOOD BY AUTOMATED COUNT: 12.9 % (ref 10–15)
GLUCOSE SERPL-MCNC: 117 MG/DL (ref 70–99)
GLUCOSE UR STRIP-MCNC: NEGATIVE MG/DL
HCO3 SERPL-SCNC: 26 MMOL/L (ref 22–29)
HCT VFR BLD AUTO: 44.2 % (ref 40–53)
HGB BLD-MCNC: 15 G/DL (ref 13.3–17.7)
HGB UR QL STRIP: NEGATIVE
IMM GRANULOCYTES # BLD: 0 10E3/UL
IMM GRANULOCYTES NFR BLD: 0 %
KETONES UR STRIP-MCNC: NEGATIVE MG/DL
LEUKOCYTE ESTERASE UR QL STRIP: NEGATIVE
LYMPHOCYTES # BLD AUTO: 1 10E3/UL (ref 0.8–5.3)
LYMPHOCYTES NFR BLD AUTO: 14 %
MCH RBC QN AUTO: 29.1 PG (ref 26.5–33)
MCHC RBC AUTO-ENTMCNC: 33.9 G/DL (ref 31.5–36.5)
MCV RBC AUTO: 86 FL (ref 78–100)
MONOCYTES # BLD AUTO: 0.7 10E3/UL (ref 0–1.3)
MONOCYTES NFR BLD AUTO: 10 %
NEUTROPHILS # BLD AUTO: 5.2 10E3/UL (ref 1.6–8.3)
NEUTROPHILS NFR BLD AUTO: 73 %
NITRATE UR QL: NEGATIVE
PH UR STRIP: 7 [PH] (ref 5–8)
PLATELET # BLD AUTO: 185 10E3/UL (ref 150–450)
POTASSIUM SERPL-SCNC: 4.3 MMOL/L (ref 3.4–5.3)
PROT SERPL-MCNC: 6.9 G/DL (ref 6.4–8.3)
RBC # BLD AUTO: 5.15 10E6/UL (ref 4.4–5.9)
SODIUM SERPL-SCNC: 142 MMOL/L (ref 135–145)
SP GR UR STRIP: 1.02 (ref 1–1.03)
UROBILINOGEN UR STRIP-ACNC: 0.2 E.U./DL
WBC # BLD AUTO: 7.1 10E3/UL (ref 4–11)

## 2025-01-13 PROCEDURE — 250N000011 HC RX IP 250 OP 636: Performed by: INTERNAL MEDICINE

## 2025-01-13 PROCEDURE — 36415 COLL VENOUS BLD VENIPUNCTURE: CPT | Performed by: INTERNAL MEDICINE

## 2025-01-13 PROCEDURE — 82565 ASSAY OF CREATININE: CPT | Performed by: INTERNAL MEDICINE

## 2025-01-13 PROCEDURE — 82565 ASSAY OF CREATININE: CPT

## 2025-01-13 PROCEDURE — 99214 OFFICE O/P EST MOD 30 MIN: CPT | Performed by: INTERNAL MEDICINE

## 2025-01-13 PROCEDURE — 74177 CT ABD & PELVIS W/CONTRAST: CPT

## 2025-01-13 PROCEDURE — 85025 COMPLETE CBC W/AUTO DIFF WBC: CPT | Performed by: INTERNAL MEDICINE

## 2025-01-13 PROCEDURE — 81003 URINALYSIS AUTO W/O SCOPE: CPT | Performed by: INTERNAL MEDICINE

## 2025-01-13 RX ORDER — IOPAMIDOL 755 MG/ML
90 INJECTION, SOLUTION INTRAVASCULAR ONCE
Status: COMPLETED | OUTPATIENT
Start: 2025-01-13 | End: 2025-01-13

## 2025-01-13 RX ADMIN — IOPAMIDOL 90 ML: 755 INJECTION, SOLUTION INTRAVENOUS at 18:01

## 2025-01-13 NOTE — PROGRESS NOTES
1. Abdominal pain, right lower quadrant (Primary)  Unclear etiology.  We discussed the differential which could include appendicitis, diverticulitis, nephrolithiasis etc.  Will have him undergo a CT scan for further evaluation.  Labs as below.  - CT Abdomen Pelvis w Contrast; Future  - UA Macroscopic with reflex to Microscopic and Culture - Lab Collect; Future  - Comprehensive metabolic panel (BMP + Alb, Alk Phos, ALT, AST, Total. Bili, TP); Future  - CBC with platelets and differential; Future    2. Sputum production  Will likely put him on a trial of Augmentin would like to get results of his scan back to see what we are dealing with there.    3. History of adenoiditis  As above    4. History of excessive cerumen  He will follow-up with his ear nose and throat doctor soon regarding this issue.    Rose Mary Rubalcava is a 79 year old, presenting for the following health issues:  Abdominal Pain (Intermittent right lower abd discomfort x 1 week; no changes in BM's ) and Ear check  (Would like bilateral ears check for wax )      1/13/2025    11:58 AM   Additional Questions   Roomed by Karen   Accompanied by alone         1/13/2025    11:58 AM   Patient Reported Additional Medications   Patient reports taking the following new medications none     History of Present Illness       Reason for visit:  Lower right side front pain  Symptom onset:  1-3 days ago  Symptoms include:  Pain  Symptom intensity:  Moderate  Symptom progression:  Staying the same  Had these symptoms before:  No  What makes it worse:  ?  What makes it better:  ?   He is taking medications regularly.           On comes in today as an urgent add-on for evaluation of right lower quadrant abdominal pain since last week.  It comes and goes.  He does not remember having anything like this before.  No change in his bowel habits.  No urinary symptoms.  No hematuria.  He does have a history of kidney stones.  No fevers or chills.  Pain does not double him  "over.  He does not get awoken in the middle of the night from pain.    He also is concerned because he is coughing up brings up sputum again.  When he had this in the past it was due to adenoiditis.  He saw Dr. Velasquez from ENT regarding this in the past who treated him with antibiotics.  Wonders if he can get that again.  He also wants to make sure that he does not have cerumen impaction.          Objective    /78 (BP Location: Left arm, Patient Position: Sitting, Cuff Size: Adult Regular)   Pulse 76   Temp 97.9  F (36.6  C) (Tympanic)   Resp 18   Ht 1.753 m (5' 9\")   Wt 97.5 kg (215 lb)   SpO2 98%   BMI 31.75 kg/m    Body mass index is 31.75 kg/m .  Physical Exam   A pleasant gentleman who does not appear acutely ill.  His right ear does have a fair amount of cerumen although not completely occlusive.  Large amount of hair growing in the ear canal.  Left ear is clear.  Abdomen is without rashes.  Back is without rashes.  Tender to palpation in the right lower quadrant.  No guarding or rebound.  No masses palpable.            Signed Electronically by: JULIANE DEJESUS MD    "

## 2025-01-29 ENCOUNTER — PATIENT OUTREACH (OUTPATIENT)
Dept: CARE COORDINATION | Facility: CLINIC | Age: 80
End: 2025-01-29
Payer: COMMERCIAL

## 2025-01-29 ENCOUNTER — TRANSFERRED RECORDS (OUTPATIENT)
Dept: HEALTH INFORMATION MANAGEMENT | Facility: CLINIC | Age: 80
End: 2025-01-29
Payer: COMMERCIAL

## 2025-02-03 ENCOUNTER — OFFICE VISIT (OUTPATIENT)
Dept: SLEEP MEDICINE | Facility: CLINIC | Age: 80
End: 2025-02-03
Payer: COMMERCIAL

## 2025-02-03 VITALS
BODY MASS INDEX: 30.96 KG/M2 | WEIGHT: 209 LBS | HEART RATE: 77 BPM | DIASTOLIC BLOOD PRESSURE: 83 MMHG | OXYGEN SATURATION: 95 % | SYSTOLIC BLOOD PRESSURE: 138 MMHG | HEIGHT: 69 IN

## 2025-02-03 DIAGNOSIS — G47.33 OSA (OBSTRUCTIVE SLEEP APNEA): Primary | ICD-10-CM

## 2025-02-03 PROCEDURE — 99213 OFFICE O/P EST LOW 20 MIN: CPT | Performed by: INTERNAL MEDICINE

## 2025-02-03 ASSESSMENT — SLEEP AND FATIGUE QUESTIONNAIRES
HOW LIKELY ARE YOU TO NOD OFF OR FALL ASLEEP WHILE SITTING AND TALKING TO SOMEONE: WOULD NEVER DOZE
HOW LIKELY ARE YOU TO NOD OFF OR FALL ASLEEP IN A CAR, WHILE STOPPED FOR A FEW MINUTES IN TRAFFIC: WOULD NEVER DOZE
HOW LIKELY ARE YOU TO NOD OFF OR FALL ASLEEP WHEN YOU ARE A PASSENGER IN A CAR FOR AN HOUR WITHOUT A BREAK: WOULD NEVER DOZE
HOW LIKELY ARE YOU TO NOD OFF OR FALL ASLEEP WHILE LYING DOWN TO REST IN THE AFTERNOON WHEN CIRCUMSTANCES PERMIT: SLIGHT CHANCE OF DOZING
HOW LIKELY ARE YOU TO NOD OFF OR FALL ASLEEP WHILE SITTING QUIETLY AFTER LUNCH WITHOUT ALCOHOL: WOULD NEVER DOZE
HOW LIKELY ARE YOU TO NOD OFF OR FALL ASLEEP WHILE SITTING AND READING: WOULD NEVER DOZE
HOW LIKELY ARE YOU TO NOD OFF OR FALL ASLEEP WHILE WATCHING TV: HIGH CHANCE OF DOZING
HOW LIKELY ARE YOU TO NOD OFF OR FALL ASLEEP WHILE SITTING INACTIVE IN A PUBLIC PLACE: WOULD NEVER DOZE

## 2025-02-03 NOTE — PATIENT INSTRUCTIONS
Please do not drive drowsy under any circumstances.  If you find yourself in a situation in which you are driving and feeling sleepy, please pull over right away in a safe place and take a quick nap before getting back on the road.

## 2025-02-03 NOTE — NURSING NOTE
"Chief Complaint   Patient presents with    CPAP Follow Up       Initial /83   Pulse 77   Ht 1.753 m (5' 9.02\")   Wt 94.8 kg (209 lb)   SpO2 95%   BMI 30.85 kg/m   Estimated body mass index is 30.85 kg/m  as calculated from the following:    Height as of this encounter: 1.753 m (5' 9.02\").    Weight as of this encounter: 94.8 kg (209 lb).    Medication Reconciliation: complete    Neck circumference: 16.5 inches / 42 centimeters.    DME: [facility].     Delonte Alonso CMA on 2/3/2025 at 2:32 PM      "

## 2025-02-03 NOTE — PROGRESS NOTES
Additional 10 minutes on the date of service was spent performing the following:    -Preparing to see the patient  -Ordering medications, tests, or procedures   -Documenting clinical information in the electronic or other health record     Thank you for the opportunity to participate in the care of Rich Musa.     He is a 79 year old y/o male patient who comes to the sleep medicine clinic for follow up. The patient was diagnosed with BRYN on 02/02/2005 (AHI=23.5).  This is the patient's first clinical visit since getting started on the new CPAP machine.  The patient reports that he is benefiting from CPAP therapy.     Assessment and Plan:  In summary Rich Musa is a 79 year old year old male who is here for follow up.  1. BRYN (obstructive sleep apnea) (Primary)  I congratulated patient on his excellent CPAP usage.  I will keep her on the same pressure settings for now.    Compliance Download data for 30 days:  Compliance: 100%  Pressure setting: APAP 13-15 CWP  95% pressure: 14.8 CWP  Leak: Minimal  Residual AHI: 3.1 events per hour  Mask Tolerance: Good  Skin irritation: None  DME: [facility]    Cpap Fu Template    Question 10/8/2024  8:48 PM CST - Filed by Patient   Do you use a CPAP Machine at home? Yes   Overall, on a scale of 0-10 how would you rate your CPAP? 10   Is your mask comfortable? Yes   Is your mask leaking? No   Do you notice snoring with mask on? No   Do you notice gasping arousals with mask on? No   Are you having significant oral or nasal dryness?    Is the pressure setting comfortable? Yes   What type of mask do you use? Full Face Mask   What is your typical bedtime? 10:00pm   How long does it take you to go to sleep on PAP therapy? ?   What time do you typically get out of bed for the day? 4-5 am   How many hours on average per night are you using PAP therapy? 5-6   How many hours are you sleeping per night? 5-6   Do you feel well rested in the morning? Yes       ASHU:  ASHU Total  "Score: (Patient-Rptd) 6  Total score - Jay: (Patient-Rptd) 4 (2/3/2025  2:26 PM)    Failed to redirect to the Timeline version of the [employer] SmartLink.   Patient Active Problem List   Diagnosis    Dyslipidemia    GERD with stricture    BRYN on CPAP    Seborrheic Dermatitis    Seborrheic Keratosis    Nephrolithiasis    Benign prostatic hyperplasia with urinary hesitancy    S/P TURP    Peripheral polyneuropathy       Past Medical History:   Diagnosis Date    Benign prostatic hyperplasia with urinary hesitancy     BPH (benign prostatic hyperplasia)     Dyslipidemia     GERD with stricture     Nephrolithiasis     BRYN (obstructive sleep apnea)     cpap    Plantar fasciitis     Seborrheic dermatitis     Seborrheic keratosis     Trochanteric bursitis        Past Surgical History:   Procedure Laterality Date    CYSTOSCOPY, TRANSURETHRAL RESECTION (TUR) PROSTATE, COMBINED N/A 11/29/2022    Procedure: CYSTOSCOPY,  BIPOLAR TRANSURETHRAL RESECTION OF PROSTATE;  Surgeon: Shaan Grewal MD;  Location: [facility] KNEE SCOPE, DIAGNOSTIC      Description: Arthroscopy Knee Right;  Recorded: 12/19/2011;       Current Outpatient Medications   Medication Sig Dispense Refill    atorvastatin (LIPITOR) 10 MG tablet Take 1 tablet (10 mg) by mouth daily 90 tablet 3    Calcium Citrate-Vitamin D (CALCIUM CITRATE +D PO) Take 1 tablet by mouth 2 times daily      Multiple Vitamins-Minerals (MULTIVITAMIN ADULT PO) Take 1 tablet by mouth daily      omeprazole (PRILOSEC) 20 MG DR capsule TAKE 1 CAPSULE BY MOUTH TWICE DAILY AS NEEDED (Patient taking differently: Take 20 mg by mouth daily.) 180 capsule 2       Allergies   Allergen Reactions    Ciprofloxacin Dizziness    Sulfa Antibiotics Rash       Physical Exam:  /83   Pulse 77   Ht 1.753 m (5' 9.02\")   Wt 94.8 kg (209 lb)   SpO2 95%   BMI 30.85 kg/m    BMI:Body mass index is 30.85 kg/m .   GEN: NAD, obese  Head: Normocephalic.  EYES: EOMI  Psych: normal mood, normal " affect    Labs/Studies:    Lab Results   Component Value Date     (H) 01/13/2025     (H) 02/01/2024     Lab Results   Component Value Date    HGB 15.0 01/13/2025    HGB 14.7 02/01/2024     Lab Results   Component Value Date    BUN 22.1 01/13/2025    BUN 20.7 02/01/2024    CR 1.2 01/13/2025    CR 1.05 01/13/2025     Lab Results   Component Value Date    AST 30 01/13/2025    AST 27 02/01/2024    ALT 14 01/13/2025    ALT 12 02/01/2024    ALKPHOS 59 01/13/2025    ALKPHOS 59 02/01/2024    BILITOTAL 0.6 01/13/2025    BILITOTAL 0.5 02/01/2024       Recent Labs   Lab Test 01/13/25  1745 01/13/25  1235 02/01/24  0914   NA  --  142 142   POTASSIUM  --  4.3 4.8   CHLORIDE  --  104 104   CO2  --  26 26   ANIONGAP  --  12 12   GLC  --  117* 104*   BUN  --  22.1 20.7   CR 1.2 1.05 1.07   LATISHA  --  9.4 9.4       I reviewed the efficacy and compliance report from his device. Data summarized on the HPI and the PAP compliance flow sheet.     Patient was strongly advised in AVS to avoid driving, operating any heavy machinery or other hazardous situations while drowsy or sleepy. Patient was counseled on the importance of driving while alert, to pull over if drowsy, or nap before getting into the vehicle if sleepy.     Patient verbalized understanding of these issues, agrees with the plan and all questions were answered today. Patient was given an opportuntity to voice any other symptoms or concerns not listed above. Patient did not have any other symptoms or concerns.      David Mathews DO  Board Certified in Internal Medicine and Sleep Medicine    (Note created with Dragon voice recognition and unintended spelling errors and word substitutions may occur)     Audio and visual devices were used for this virtual clinic visit with permission from patient.

## 2025-02-03 NOTE — NURSING NOTE
1 year appointment reminder message was created and will be sent out 2 - 3 months prior to next appointment due date. Via [employer]

## 2025-02-04 SDOH — HEALTH STABILITY: PHYSICAL HEALTH: ON AVERAGE, HOW MANY MINUTES DO YOU ENGAGE IN EXERCISE AT THIS LEVEL?: 60 MIN

## 2025-02-04 SDOH — HEALTH STABILITY: PHYSICAL HEALTH: ON AVERAGE, HOW MANY DAYS PER WEEK DO YOU ENGAGE IN MODERATE TO STRENUOUS EXERCISE (LIKE A BRISK WALK)?: 3 DAYS

## 2025-02-04 ASSESSMENT — SOCIAL DETERMINANTS OF HEALTH (SDOH): HOW OFTEN DO YOU GET TOGETHER WITH FRIENDS OR RELATIVES?: ONCE A WEEK

## 2025-02-05 ENCOUNTER — OFFICE VISIT (OUTPATIENT)
Dept: INTERNAL MEDICINE | Facility: CLINIC | Age: 80
End: 2025-02-05
Payer: COMMERCIAL

## 2025-02-05 VITALS
TEMPERATURE: 97.7 F | SYSTOLIC BLOOD PRESSURE: 134 MMHG | DIASTOLIC BLOOD PRESSURE: 82 MMHG | BODY MASS INDEX: 30.54 KG/M2 | WEIGHT: 206.2 LBS | HEART RATE: 65 BPM | RESPIRATION RATE: 17 BRPM | HEIGHT: 69 IN | OXYGEN SATURATION: 98 %

## 2025-02-05 DIAGNOSIS — E78.5 DYSLIPIDEMIA: ICD-10-CM

## 2025-02-05 DIAGNOSIS — R73.9 HYPERGLYCEMIA: ICD-10-CM

## 2025-02-05 DIAGNOSIS — Z29.11 NEED FOR VACCINATION AGAINST RESPIRATORY SYNCYTIAL VIRUS: ICD-10-CM

## 2025-02-05 DIAGNOSIS — G47.33 OSA ON CPAP: ICD-10-CM

## 2025-02-05 DIAGNOSIS — K22.2 GERD WITH STRICTURE: ICD-10-CM

## 2025-02-05 DIAGNOSIS — Z00.00 ENCOUNTER FOR MEDICARE ANNUAL WELLNESS EXAM: Primary | ICD-10-CM

## 2025-02-05 DIAGNOSIS — K21.9 GERD WITH STRICTURE: ICD-10-CM

## 2025-02-05 DIAGNOSIS — H61.21 IMPACTED CERUMEN OF RIGHT EAR: ICD-10-CM

## 2025-02-05 DIAGNOSIS — N40.1 BENIGN PROSTATIC HYPERPLASIA WITH URINARY HESITANCY: ICD-10-CM

## 2025-02-05 DIAGNOSIS — G62.9 PERIPHERAL POLYNEUROPATHY: ICD-10-CM

## 2025-02-05 DIAGNOSIS — R39.11 BENIGN PROSTATIC HYPERPLASIA WITH URINARY HESITANCY: ICD-10-CM

## 2025-02-05 LAB
CHOLEST SERPL-MCNC: 136 MG/DL
EST. AVERAGE GLUCOSE BLD GHB EST-MCNC: 123 MG/DL
FASTING STATUS PATIENT QL REPORTED: YES
HBA1C MFR BLD: 5.9 % (ref 0–5.6)
HDLC SERPL-MCNC: 44 MG/DL
LDLC SERPL CALC-MCNC: 68 MG/DL
NONHDLC SERPL-MCNC: 92 MG/DL
PSA SERPL DL<=0.01 NG/ML-MCNC: 0.85 NG/ML (ref 0–6.5)
TRIGL SERPL-MCNC: 118 MG/DL
TSH SERPL DL<=0.005 MIU/L-ACNC: 2.6 UIU/ML (ref 0.3–4.2)
VIT B12 SERPL-MCNC: 679 PG/ML (ref 232–1245)

## 2025-02-05 PROCEDURE — 82607 VITAMIN B-12: CPT | Performed by: INTERNAL MEDICINE

## 2025-02-05 PROCEDURE — 83036 HEMOGLOBIN GLYCOSYLATED A1C: CPT | Performed by: INTERNAL MEDICINE

## 2025-02-05 PROCEDURE — 36415 COLL VENOUS BLD VENIPUNCTURE: CPT | Performed by: INTERNAL MEDICINE

## 2025-02-05 PROCEDURE — 69209 REMOVE IMPACTED EAR WAX UNI: CPT | Performed by: INTERNAL MEDICINE

## 2025-02-05 PROCEDURE — G0439 PPPS, SUBSEQ VISIT: HCPCS | Performed by: INTERNAL MEDICINE

## 2025-02-05 PROCEDURE — 84443 ASSAY THYROID STIM HORMONE: CPT | Performed by: INTERNAL MEDICINE

## 2025-02-05 PROCEDURE — 99214 OFFICE O/P EST MOD 30 MIN: CPT | Mod: 25 | Performed by: INTERNAL MEDICINE

## 2025-02-05 PROCEDURE — 80061 LIPID PANEL: CPT | Performed by: INTERNAL MEDICINE

## 2025-02-05 PROCEDURE — 84153 ASSAY OF PSA TOTAL: CPT | Performed by: INTERNAL MEDICINE

## 2025-02-05 NOTE — PATIENT INSTRUCTIONS
Patient Education   Preventive Care Advice   This is general advice given by our system to help you stay healthy. However, your care team may have specific advice just for you. Please talk to your care team about your preventive care needs.  Nutrition  Eat 5 or more servings of fruits and vegetables each day.  Try wheat bread, brown rice and whole grain pasta (instead of white bread, rice, and pasta).  Get enough calcium and vitamin D. Check the label on foods and aim for 100% of the RDA (recommended daily allowance).  Lifestyle  Exercise at least 150 minutes each week  (30 minutes a day, 5 days a week).  Do muscle strengthening activities 2 days a week. These help control your weight and prevent disease.  No smoking.  Wear sunscreen to prevent skin cancer.  Have a dental exam and cleaning every 6 months.  Yearly exams  See your health care team every year to talk about:  Any changes in your health.  Any medicines your care team has prescribed.  Preventive care, family planning, and ways to prevent chronic diseases.  Shots (vaccines)   HPV shots (up to age 26), if you've never had them before.  Hepatitis B shots (up to age 59), if you've never had them before.  COVID-19 shot: Get this shot when it's due.  Flu shot: Get a flu shot every year.  Tetanus shot: Get a tetanus shot every 10 years.  Pneumococcal, hepatitis A, and RSV shots: Ask your care team if you need these based on your risk.  Shingles shot (for age 50 and up)  General health tests  Diabetes screening:  Starting at age 35, Get screened for diabetes at least every 3 years.  If you are younger than age 35, ask your care team if you should be screened for diabetes.  Cholesterol test: At age 39, start having a cholesterol test every 5 years, or more often if advised.  Bone density scan (DEXA): At age 50, ask your care team if you should have this scan for osteoporosis (brittle bones).  Hepatitis C: Get tested at least once in your life.  STIs (sexually  transmitted infections)  Before age 24: Ask your care team if you should be screened for STIs.  After age 24: Get screened for STIs if you're at risk. You are at risk for STIs (including HIV) if:  You are sexually active with more than one person.  You don't use condoms every time.  You or a partner was diagnosed with a sexually transmitted infection.  If you are at risk for HIV, ask about PrEP medicine to prevent HIV.  Get tested for HIV at least once in your life, whether you are at risk for HIV or not.  Cancer screening tests  Cervical cancer screening: If you have a cervix, begin getting regular cervical cancer screening tests starting at age 21.  Breast cancer scan (mammogram): If you've ever had breasts, begin having regular mammograms starting at age 40. This is a scan to check for breast cancer.  Colon cancer screening: It is important to start screening for colon cancer at age 45.  Have a colonoscopy test every 10 years (or more often if you're at risk) Or, ask your provider about stool tests like a FIT test every year or Cologuard test every 3 years.  To learn more about your testing options, visit:   .  For help making a decision, visit:   https://bit.ly/qb80487.  Prostate cancer screening test: If you have a prostate, ask your care team if a prostate cancer screening test (PSA) at age 55 is right for you.  Lung cancer screening: If you are a current or former smoker ages 50 to 80, ask your care team if ongoing lung cancer screenings are right for you.  For informational purposes only. Not to replace the advice of your health care provider. Copyright   2023 [employer] All rights reserved. Clinically reviewed by the [facility]. [employer] [id number] - REV 01/24.  Hearing Loss: Care Instructions  Overview     Hearing loss is a sudden or slow decrease in how well you hear. It can range from slight to profound. Permanent hearing loss can occur with aging. It also can  happen when you are exposed long-term to loud noise. Examples include listening to loud music, riding motorcycles, or being around other loud machines.  Hearing loss can affect your work and home life. It can make you feel lonely or depressed. You may feel that you have lost your independence. But hearing aids and other devices can help you hear better and feel connected to others.  Follow-up care is a key part of your treatment and safety. Be sure to make and go to all appointments, and call your doctor if you are having problems. It's also a good idea to know your test results and keep a list of the medicines you take.  How can you care for yourself at home?  Avoid loud noises whenever possible. This helps keep your hearing from getting worse.  Always wear hearing protection around loud noises.  Wear a hearing aid as directed.  A professional can help you pick a hearing aid that will work best for you.  You can also get hearing aids over the counter for mild to moderate hearing loss.  Have hearing tests as your doctor suggests. They can show whether your hearing has changed. Your hearing aid may need to be adjusted.  Use other devices as needed. These may include:  Telephone amplifiers and hearing aids that can connect to a television, stereo, radio, or microphone.  Devices that use lights or vibrations. These alert you to the doorbell, a ringing telephone, or a baby monitor.  Television closed-captioning. This shows the words at the bottom of the screen. Most new TVs can do this.  TTY (text telephone). This lets you type messages back and forth on the telephone instead of talking or listening. These devices are also called TDD. When messages are typed on the keyboard, they are sent over the phone line to a receiving TTY. The message is shown on a monitor.  Use text messaging, social media, and email if it is hard for you to communicate by telephone.  Try to learn a listening technique called speechreading. It is  "not lipreading. You pay attention to people's gestures, expressions, posture, and tone of voice. These clues can help you understand what a person is saying. Face the person you are talking to, and have them face you. Make sure the lighting is good. You need to see the other person's face clearly.  Think about counseling if you need help to adjust to your hearing loss.  When should you call for help?  Watch closely for changes in your health, and be sure to contact your doctor if:    You think your hearing is getting worse.     You have new symptoms, such as dizziness or nausea.   Where can you learn more?  Go to https://www.cortical.io.net/patiented  Enter R798 in the search box to learn more about \"Hearing Loss: Care Instructions.\"  Current as of: September 27, 2023  Content Version: 14.3    2024 [employer].   Care instructions adapted under license by your healthcare professional. If you have questions about a medical condition or this instruction, always ask your healthcare professional. [employer] disclaims any warranty or liability for your use of this information.    Eating Healthy Foods: Care Instructions  With every meal, you can make healthy food choices. Try to eat a variety of fruits, vegetables, whole grains, lean proteins, and low-fat dairy products. This can help you get the right balance of nutrients, including vitamins and minerals. Small changes add up over time. You can start by adding one healthy food to your meals each day.    Try to make half your plate fruits and vegetables, one-fourth whole grains, and one-fourth lean proteins. Try including dairy with your meals.   Eat more fruits and vegetables. Try to have them with most meals and snacks.   Foods for healthy eating        Fruits   These can be fresh, frozen, canned, or dried.  Try to choose whole fruit rather than fruit juice.  Eat a variety of colors.        Vegetables   These can be fresh, frozen, canned, or " "dried.  Beans, peas, and lentils count too.        Whole grains   Choose whole-grain breads, cereals, and noodles.  Try brown rice.        Lean proteins   These can include lean meat, poultry, fish, and eggs.  You can also have tofu, beans, peas, lentils, nuts, and seeds.        Dairy   Try milk, yogurt, and cheese.  Choose low-fat or fat-free when you can.  If you need to, use lactose-free milk or fortified plant-based milk products, such as soy milk.        Water   Drink water when you're thirsty.  Limit sugar-sweetened drinks, including soda, fruit drinks, and sports drinks.  Where can you learn more?  Go to https://www.Ecofoot.net/patiented  Enter T756 in the search box to learn more about \"Eating Healthy Foods: Care Instructions.\"  Current as of: September 20, 2023  Content Version: 14.3    2024 [employer].   Care instructions adapted under license by your healthcare professional. If you have questions about a medical condition or this instruction, always ask your healthcare professional. [employer] disclaims any warranty or liability for your use of this information.    Learning About Being Physically Active  What is physical activity?     Being physically active means doing any kind of activity that gets your body moving.  The types of physical activity that can help you get fit and stay healthy include:  Aerobic or \"cardio\" activities. These make your heart beat faster and make you breathe harder, such as brisk walking, riding a bike, or running. They strengthen your heart and lungs and build up your endurance.  Strength training activities. These make your muscles work against, or \"resist,\" something. Examples include lifting weights or doing push-ups. These activities help tone and strengthen your muscles and bones.  Stretches. These let you move your joints and muscles through their full range of motion. Stretching helps you be more flexible.  Reaching a balance between these " three types of physical activity is important because each one contributes to your overall fitness.  What are the benefits of being active?  Being active is one of the best things you can do for your health. It helps you to:  Feel stronger and have more energy to do all the things you like to do.  Focus better at school or work.  Feel, think, and sleep better.  Reach and stay at a healthy weight.  Lose fat and build lean muscle.  Lower your risk for serious health problems, including diabetes, heart attack, high blood pressure, and some cancers.  Keep your heart, lungs, bones, muscles, and joints strong and healthy.  How can you make being active part of your life?  Start slowly. Make it your long-term goal to get at least 30 minutes of exercise on most days of the week. Walking is a good choice. You also may want to do other activities, such as running, swimming, cycling, or playing tennis or team sports.  Pick activities that you like--ones that make your heart beat faster, your muscles stronger, and your muscles and joints more flexible. If you find more than one thing you like doing, do them all. You don't have to do the same thing every day.  Get your heart pumping every day. Any activity that makes your heart beat faster and keeps it at that rate for a while counts.  Here are some great ways to get your heart beating faster:  Go for a brisk walk, run, or hike.  Go for a swim or bike ride.  Take an online exercise class or dance.  Play a game of touch football, basketball, or soccer.  Play tennis, pickleball, or racquetball.  Climb stairs.  Even some household chores can be aerobic. Just do them at a faster pace. Raking or mowing the lawn, sweeping the garage, and vacuuming and cleaning your home all can help get your heart rate up.  Strengthen your muscles during the week. You don't have to lift heavy weights or grow big, bulky muscles to get stronger. Doing a few simple activities that make your muscles work  "against, or \"resist,\" something can help you get stronger. Aim for at least twice a week.  For example, you can:  Do push-ups or sit-ups, which use your own body weight as resistance.  Lift weights or dumbbells or use stretch bands at home or in a gym or community center.  Stretch your muscles often. Stretching will help you as you become more active. It can help you stay flexible and loosen tight muscles. It can also help improve your balance and posture and can be a great way to relax.  Be sure to stretch the muscles you'll be using when you work out. It's best to warm your muscles slightly before you stretch them. Walk or do some other light aerobic activity for a few minutes. Then start stretching.  When you stretch your muscles:  Do it slowly. Stretching is not about going fast or making sudden movements.  Don't push or bounce during a stretch.  Hold each stretch for at least 15 to 30 seconds, if you can. You should feel a stretch in the muscle, but not pain.  Breathe out as you do the stretch. Then breathe in as you hold the stretch. Don't hold your breath.  If you're worried about how more activity might affect your health, have a checkup before you start. Follow any special advice your doctor gives you for getting a smart start.  Where can you learn more?  Go to https://www.Workle.net/patiented  Enter W332 in the search box to learn more about \"Learning About Being Physically Active.\"  Current as of: July 31, 2024  Content Version: 14.3    2024 [employer].   Care instructions adapted under license by your healthcare professional. If you have questions about a medical condition or this instruction, always ask your healthcare professional. [employer] disclaims any warranty or liability for your use of this information.       "

## 2025-02-05 NOTE — PROGRESS NOTES
"Preventive Care Visit  [facility]  JULIANE DEJESUS MD, Internal Medicine  Feb 5, 2025      Assessment & Plan         1. Encounter for Medicare annual wellness exam (Primary)  He is living an active and healthy lifestyle.  We discussed RSV vaccination.  I will see him back next year.    2. BRYN on CPAP  Continue CPAP therapy.    3. GERD with stricture  Good control with omeprazole daily.    4. Dyslipidemia  Lipids today for monitoring on his statin.  - Lipid panel reflex to direct LDL Non-fasting; Future    5. Impacted cerumen of right ear  Cerumen removal today.    Patient identified using two patient identifiers.  Ear exam showing wax occlusion completed by JAILENE Jones.  Solution: warm water was placed in the right ear(s) via irrigation tool: elephant ear. I was unable to removed impacted cerumen due to excessive hair in ear canal. Patient indicates that he will also be seeing ENT/Audiology in the next month for ear hair trim and will have cerumen removal at that appointment. No bleeding noted post ear irrigation.       6. Peripheral polyneuropathy  This has been stable.  Not terribly painful for him.  Check labs again as but I do not think we need to do any further evaluation or management at this time as it has been stable for years    7. Hyperglycemia  Blood sugar was a whole little high recently.  Check A1c.    8. Benign prostatic hyperplasia with urinary hesitancy  PSA today for monitoring.  Good control of his urination and good flow after his TURP.    9. Need for vaccination against respiratory syncytial virus    - RSV vaccine, bivalent, ABRYSVO, injection; Inject 0.5 mLs into the muscle once for 1 dose. Pharmacist administered  Dispense: 0.5 mL; Refill: 0         BMI  Estimated body mass index is 30.54 kg/m  as calculated from the following:    Height as of this encounter: 1.75 m (5' 8.9\").    Weight as of this encounter: 93.5 kg (206 lb 3.2 oz).       Counseling  Appropriate preventive " services were addressed with this patient via screening, questionnaire, or discussion as appropriate for fall prevention, nutrition, physical activity, Tobacco-use cessation, social engagement, weight loss and cognition.  Checklist reviewing preventive services available has been given to the patient.  Reviewed patient's diet, addressing concerns and/or questions.   He is at risk for lack of exercise and has been provided with information to increase physical activity for the benefit of his well-being.   The patient was provided with written information regarding signs of hearing loss.           Subjective   Khadar is a 79 year old, presenting for the following:  Annual Visit (Annual Visit)        2/5/2025     8:43 AM   Additional Questions   Roomed by Margy   Accompanied by Alone         2/5/2025     8:43 AM   Patient Reported Additional Medications   Patient reports taking the following new medications None           HPI    Khadar comes in today for his annual wellness.  He reports he has been feeling very well.  Continues to complain of his ongoing neuropathic discomfort in his toes.  It has not worsened in many years.  He just mainly feels a thickening or fullness in his toes when he curls them in the morning.  Sometimes if he stepped on something wrong it will hurt but otherwise no pain associated.  He is compliant with his CPAP.  He has a history of reflux with stricture and he said no issues with swallowing.  He has BPH and status post TURP that has been stable with good flow.  Looking forward to spending some time in [place] in the spring.  He has never been there before.  Otherwise no somatic concerns for me.  His belly pain that he had last month and his cough that he had have resolved and he feels quite good.      Health Care Directive  Patient has a Health Care Directive on file  Advance care planning document is on file and is current.      2/4/2025   General Health   How would you rate your overall  physical health? Excellent   Feel stress (tense, anxious, or unable to sleep) Not at all         2/4/2025   Nutrition   Diet: Regular (no restrictions)         2/4/2025   Exercise   Days per week of moderate/strenous exercise 3 days   Average minutes spent exercising at this level 60 min         2/4/2025   Social Factors   Frequency of gathering with friends or relatives Once a week   Worry food won't last until get money to buy more No   Food not last or not have enough money for food? No   Do you have housing? (Housing is defined as stable permanent housing and does not include staying ouside in a car, in a tent, in an abandoned building, in an overnight shelter, or couch-surfing.) Yes   Are you worried about losing your housing? No   Lack of transportation? No   Unable to get utilities (heat,electricity)? Yes   Want help with housing or utility concern? No   (!) FINANCIAL RESOURCE STRAIN CONCERN      2/4/2025   Fall Risk   Fallen 2 or more times in the past year? No    No   Trouble with walking or balance? No    No       Multiple values from one day are sorted in reverse-chronological order          2/4/2025   Activities of Daily Living- Home Safety   Needs help with the following daily activites None of the above   Safety concerns in the home None of the above         2/4/2025   Dental   Dentist two times every year? Yes         2/4/2025   Hearing Screening   Hearing concerns? (!) IT'S HARD TO FOLLOW A CONVERSATION IN A NOISY RESTAURANT OR CROWDED ROOM.    (!) TROUBLE UNDERSTANDING SOFT OR WHISPERED SPEECH.       Multiple values from one day are sorted in reverse-chronological order         2/4/2025   Driving Risk Screening   Patient/family members have concerns about driving No         2/4/2025   General Alertness/Fatigue Screening   Have you been more tired than usual lately? No         2/4/2025   Urinary Incontinence Screening   Bothered by leaking urine in past 6 months No            Today's PHQ-2 Score:        2/4/2025     3:02 PM   PHQ-2 ( 1999 [employer])   Q1: Little interest or pleasure in doing things 0   Q2: Feeling down, depressed or hopeless 0   PHQ-2 Score 0    Q1: Little interest or pleasure in doing things Not at all   Q2: Feeling down, depressed or hopeless Not at all   PHQ-2 Score 0       Patient-reported           2/4/2025   Substance Use   Alcohol more than 3/day or more than 7/wk No   Do you have a current opioid prescription? No   How severe/bad is pain from 1 to 10? 1/10   Do you use any other substances recreationally? No     Social History     Tobacco Use    Smoking status: Never    Smokeless tobacco: Never   Substance Use Topics    Alcohol use: No     Comment: Alcoholic Drinks/day: social 3 times per month    Drug use: No       ASCVD Risk   The 10-year ASCVD risk score (Jamie VARGAS, et al., 2019) is: 31.3%    Values used to calculate the score:      Age: 79 years      Sex: Male      Is Non- : No      Diabetic: No      Tobacco smoker: No      Systolic Blood Pressure: 134 mmHg      Is BP treated: No      HDL Cholesterol: 36 mg/dL      Total Cholesterol: 173 mg/dL            Reviewed and updated as needed this visit by Provider                      Current providers sharing in care for this patient include:  Patient Care Team:  Antonio Hawley MD as PCP - General (Internal Medicine)  Black Carlos MD as MD  Antonio Hawley MD as Assigned PCP  Nando Velasquez MD as MD (Otolaryngology)  Nando Velasquez MD as Assigned Surgical Provider  David Mathews DO as Assigned Sleep Provider    The following health maintenance items are reviewed in Epic and correct as of today:  Health Maintenance   Topic Date Due    RSV VACCINE (1 - 1-dose 75+ series) Never done    LIPID  02/01/2025    COVID-19 Vaccine (9 - 2024-25 season) 04/08/2025    MEDICARE ANNUAL WELLNESS VISIT  02/05/2026    ANNUAL REVIEW OF HM ORDERS  02/05/2026    FALL RISK ASSESSMENT  02/05/2026     "GLUCOSE  01/13/2028    DTAP/TDAP/TD IMMUNIZATION (3 - Td or Tdap) 11/06/2029    ADVANCE CARE PLANNING  02/05/2030    COLORECTAL CANCER SCREENING  06/09/2030    HEPATITIS C SCREENING  Completed    PHQ-2 (once per calendar year)  Completed    INFLUENZA VACCINE  Completed    Pneumococcal Vaccine: 50+ Years  Completed    ZOSTER IMMUNIZATION  Completed    HPV IMMUNIZATION  Aged Out    MENINGITIS IMMUNIZATION  Aged Out            Objective    Exam  /82   Pulse 65   Temp 97.7  F (36.5  C) (Oral)   Resp 17   Ht 1.75 m (5' 8.9\")   Wt 93.5 kg (206 lb 3.2 oz)   SpO2 98%   BMI 30.54 kg/m     Estimated body mass index is 30.54 kg/m  as calculated from the following:    Height as of this encounter: 1.75 m (5' 8.9\").    Weight as of this encounter: 93.5 kg (206 lb 3.2 oz).    Physical Exam  Pleasant gentleman who looks well and is in no distress.  HEENT is unremarkable except for cerumen impaction on the right.  Lungs clear.  Heart regular.  Abdomen soft nontender.  Extremities without edema.  Monofilament testing normal throughout.        2/5/2025   Mini Cog   Clock Draw Score 2 Normal   3 Item Recall 3 objects recalled   Mini Cog Total Score 5              Signed Electronically by: JULIANE DEJESUS MD    "

## 2025-03-02 DIAGNOSIS — E78.5 DYSLIPIDEMIA: ICD-10-CM

## 2025-03-03 RX ORDER — ATORVASTATIN CALCIUM 10 MG/1
10 TABLET, FILM COATED ORAL DAILY
Qty: 90 TABLET | Refills: 0 | Status: SHIPPED | OUTPATIENT
Start: 2025-03-03

## 2025-05-02 ENCOUNTER — TRANSFERRED RECORDS (OUTPATIENT)
Dept: HEALTH INFORMATION MANAGEMENT | Facility: CLINIC | Age: 80
End: 2025-05-02
Payer: COMMERCIAL

## 2025-05-20 ENCOUNTER — MYC MEDICAL ADVICE (OUTPATIENT)
Dept: INTERNAL MEDICINE | Facility: CLINIC | Age: 80
End: 2025-05-20
Payer: COMMERCIAL

## 2025-05-30 DIAGNOSIS — E78.5 DYSLIPIDEMIA: ICD-10-CM

## 2025-06-02 ENCOUNTER — MYC REFILL (OUTPATIENT)
Dept: INTERNAL MEDICINE | Facility: CLINIC | Age: 80
End: 2025-06-02
Payer: COMMERCIAL

## 2025-06-02 DIAGNOSIS — E78.5 DYSLIPIDEMIA: ICD-10-CM

## 2025-06-02 RX ORDER — ATORVASTATIN CALCIUM 10 MG/1
10 TABLET, FILM COATED ORAL DAILY
Qty: 90 TABLET | Refills: 1 | Status: SHIPPED | OUTPATIENT
Start: 2025-06-02

## 2025-06-02 RX ORDER — ATORVASTATIN CALCIUM 10 MG/1
10 TABLET, FILM COATED ORAL DAILY
Qty: 90 TABLET | Refills: 0 | OUTPATIENT
Start: 2025-06-02

## 2025-06-16 ENCOUNTER — TRANSFERRED RECORDS (OUTPATIENT)
Dept: HEALTH INFORMATION MANAGEMENT | Facility: CLINIC | Age: 80
End: 2025-06-16
Payer: COMMERCIAL

## 2025-07-08 ENCOUNTER — OFFICE VISIT (OUTPATIENT)
Dept: INTERNAL MEDICINE | Facility: CLINIC | Age: 80
End: 2025-07-08
Payer: COMMERCIAL

## 2025-07-08 VITALS
BODY MASS INDEX: 29.73 KG/M2 | OXYGEN SATURATION: 96 % | DIASTOLIC BLOOD PRESSURE: 79 MMHG | HEIGHT: 69 IN | HEART RATE: 85 BPM | SYSTOLIC BLOOD PRESSURE: 129 MMHG | WEIGHT: 200.7 LBS | TEMPERATURE: 98.7 F

## 2025-07-08 DIAGNOSIS — R39.11 BENIGN PROSTATIC HYPERPLASIA WITH URINARY HESITANCY: ICD-10-CM

## 2025-07-08 DIAGNOSIS — Z90.79 S/P TURP: ICD-10-CM

## 2025-07-08 DIAGNOSIS — M17.11 PRIMARY OSTEOARTHRITIS OF RIGHT KNEE: ICD-10-CM

## 2025-07-08 DIAGNOSIS — K21.9 GERD WITH STRICTURE: ICD-10-CM

## 2025-07-08 DIAGNOSIS — Z01.818 PREOP GENERAL PHYSICAL EXAM: Primary | ICD-10-CM

## 2025-07-08 DIAGNOSIS — K22.2 GERD WITH STRICTURE: ICD-10-CM

## 2025-07-08 DIAGNOSIS — N40.1 BENIGN PROSTATIC HYPERPLASIA WITH URINARY HESITANCY: ICD-10-CM

## 2025-07-08 DIAGNOSIS — R73.03 PREDIABETES: ICD-10-CM

## 2025-07-08 DIAGNOSIS — G47.33 OSA ON CPAP: ICD-10-CM

## 2025-07-08 LAB
ERYTHROCYTE [DISTWIDTH] IN BLOOD BY AUTOMATED COUNT: 12.9 % (ref 10–15)
EST. AVERAGE GLUCOSE BLD GHB EST-MCNC: 123 MG/DL
HBA1C MFR BLD: 5.9 % (ref 0–5.6)
HCT VFR BLD AUTO: 44.2 % (ref 40–53)
HGB BLD-MCNC: 14.8 G/DL (ref 13.3–17.7)
MCH RBC QN AUTO: 28.8 PG (ref 26.5–33)
MCHC RBC AUTO-ENTMCNC: 33.5 G/DL (ref 31.5–36.5)
MCV RBC AUTO: 86 FL (ref 78–100)
PLATELET # BLD AUTO: 210 10E3/UL (ref 150–450)
RBC # BLD AUTO: 5.14 10E6/UL (ref 4.4–5.9)
WBC # BLD AUTO: 7.6 10E3/UL (ref 4–11)

## 2025-07-08 PROCEDURE — 83036 HEMOGLOBIN GLYCOSYLATED A1C: CPT | Performed by: INTERNAL MEDICINE

## 2025-07-08 PROCEDURE — 36415 COLL VENOUS BLD VENIPUNCTURE: CPT | Performed by: INTERNAL MEDICINE

## 2025-07-08 PROCEDURE — 3078F DIAST BP <80 MM HG: CPT | Performed by: INTERNAL MEDICINE

## 2025-07-08 PROCEDURE — 3074F SYST BP LT 130 MM HG: CPT | Performed by: INTERNAL MEDICINE

## 2025-07-08 PROCEDURE — 80048 BASIC METABOLIC PNL TOTAL CA: CPT | Performed by: INTERNAL MEDICINE

## 2025-07-08 PROCEDURE — 85027 COMPLETE CBC AUTOMATED: CPT | Performed by: INTERNAL MEDICINE

## 2025-07-08 PROCEDURE — 99214 OFFICE O/P EST MOD 30 MIN: CPT | Performed by: INTERNAL MEDICINE

## 2025-07-08 PROCEDURE — 93010 ELECTROCARDIOGRAM REPORT: CPT | Performed by: GENERAL ACUTE CARE HOSPITAL

## 2025-07-08 PROCEDURE — G2211 COMPLEX E/M VISIT ADD ON: HCPCS | Performed by: INTERNAL MEDICINE

## 2025-07-08 PROCEDURE — 1125F AMNT PAIN NOTED PAIN PRSNT: CPT | Performed by: INTERNAL MEDICINE

## 2025-07-08 PROCEDURE — 93005 ELECTROCARDIOGRAM TRACING: CPT | Performed by: INTERNAL MEDICINE

## 2025-07-08 ASSESSMENT — PAIN SCALES - GENERAL: PAINLEVEL_OUTOF10: MILD PAIN (2)

## 2025-07-08 NOTE — PATIENT INSTRUCTIONS
How to Take Your Medication Before Surgery  Preoperative Medication Instructions   Antiplatelet or Anticoagulation Medication Instructions   - We reviewed the medication list and the patient is not on an antiplatelet or anticoagulation medications.    Additional Medication Instructions   - Herbal medications and vitamins: DO NOT TAKE 14 days prior to surgery.    Morning of your procedure please take your omeprazole with a sip of water.  You may hold all other medications.       Patient Education   Preparing for Your Surgery  For Adults  Getting started  In most cases, a nurse will call to review your health history and instructions. They will give you an arrival time based on your scheduled surgery time. Please be ready to share:  Your doctor's clinic name and phone number  Your medical, surgical, and anesthesia history  A list of allergies and sensitivities  A list of medicines, including herbal treatments and over-the-counter drugs  Whether the patient has a legal guardian (ask how to send us the papers in advance)  Note: You may not receive a call if you were seen at our PAC (Preoperative Assessment Center).  Please tell us if you're pregnant--or if there's any chance you might be pregnant. Some surgeries may injure a fetus (unborn baby), so they require a pregnancy test. Surgeries that are safe for a fetus don't always need a test, and you can choose whether to have one.   Preparing for surgery  Within 10 to 30 days of surgery: Have a pre-op exam (sometimes called an H&P, or History and Physical). This can be done at a clinic or pre-operative center.  If you're having a , you may not need this exam. Talk to your care team.  At your pre-op exam, talk to your care team about all medicines you take. (This includes CBD oil and any drugs, such as THC, marijuana, and other forms of cannabis.) If you need to stop any medicine before surgery, ask when to start taking it again.  This is for your safety. Many  medicines and drugs can make you bleed too much during surgery. Some change how well surgery (anesthesia) drugs work.  Call your insurance company to let them know you're having surgery. (If you don't have insurance, call [phone].)  Call your clinic if there's any change in your health. This includes a scrape or scratch near the surgery site, or any signs of a cold (sore throat, runny nose, cough, rash, fever).  Eating and drinking guidelines  For your safety: Unless your surgeon tells you otherwise, follow the guidelines below.  Eat and drink as normal until 8 hours before you arrive for surgery. After that, no food or milk. You can spit out gum when you arrive.  Drink clear liquids until 2 hours before you arrive. These are liquids you can see through, like water, Gatorade, and Propel Water. They also include plain black coffee and tea (no cream or milk).  No alcohol for 24 hours before you arrive. The night before surgery, stop any drinks that contain THC.  If your care team tells you to take medicine on the morning of surgery, it's okay to take it with a sip of water. No other medicines or drugs are allowed (including CBD oil)--follow your care team's instructions.  If you have questions the day of surgery, call your hospital or surgery center.   Preventing infection  Shower or bathe the night before and the morning of surgery. Follow the instructions your clinic gave you. (If no instructions, use regular soap.)  Don't shave or clip hair near your surgery site. We'll remove the hair if needed.  Don't smoke or vape the morning of surgery. No chewing tobacco for 6 hours before you arrive. A nicotine patch is okay. You may spit out nicotine gum when you arrive.  For some surgeries, the surgeon will tell you to fully quit smoking and nicotine.  We will make every effort to keep you safe from infection. We will:  Clean our hands often with soap and water (or an alcohol-based hand rub).  Clean the skin at your  surgery site with a special soap that kills germs.  Give you a special gown to keep you warm. (Cold raises the risk of infection.)  Wear hair covers, masks, gowns, and gloves during surgery.  Give antibiotic medicine, if prescribed. Not all surgeries need this medicine.  What to bring on the day of surgery  Photo ID and insurance card  Copy of your health care directive, if you have one  Glasses and hearing aids (bring cases)  You can't wear contacts during surgery  Inhaler and eye drops, if you use them (tell us about these when you arrive)  CPAP machine or breathing device, if you use them  A few personal items, if spending the night  If you have . . .  A pacemaker, ICD (cardiac defibrillator), or other implant: Bring the ID card.  An implanted stimulator: Bring the remote control.  A legal guardian: Bring a copy of the certified (court-stamped) guardianship papers.  Please remove any jewelry, including body piercings. Leave jewelry and other valuables at home.  If you're going home the day of surgery  You must have a support person drive you home. They should stay with you overnight, and they may need to help with your self-care.  If you don't have a support person, please tells us as soon as possible. We can help.  After surgery  If it's hard to control your pain or you need more pain medicine, please call your surgeon's office.  Questions?   If you have any questions for your care team, list them here:   ____________________________________________________________________________________________________________________________________________________________________________________________________________________________________________________________  For informational purposes only. Not to replace the advice of your health care provider. Copyright   2003, 2019 [facility]. All rights reserved. Clinically reviewed by Leander Salazar MD. [employer] 362184 - REV 02/25.

## 2025-07-08 NOTE — PROGRESS NOTES
Preoperative Evaluation  [facility]  [address] [place] [address]  Phone: [phone]  Fax: [phone]  Primary Provider: JULIANE DEJESUS MD  Pre-op Performing Provider: JULIANE DEJESUS MD  Jul 8, 2025 7/4/2025   Surgical Information   What procedure is being done? Total knee replacement   Facility or Hospital where procedure/surgery will be performed: [facility]   Who is doing the procedure / surgery? Dr. Sharan Jensen   Date of surgery / procedure: August 5, 2025   Time of surgery / procedure: ?   Where do you plan to recover after surgery? at home with family     Fax number for surgical facility: [phone]    Assessment & Plan     The proposed surgical procedure is considered INTERMEDIATE risk.      1. Preop general physical exam (Primary)  Proceed with knee arthroplasty as planned.  We discussed what he can expect perioperatively and postoperatively.  He he was given perioperative med management instructions.  - EKG 12-lead ([facility] and [facility] Clinics Only)  - CBC with platelets; Future  - Basic metabolic panel  (Ca, Cl, CO2, Creat, Gluc, K, Na, BUN); Future  - Hemoglobin A1c; Future    2. Primary osteoarthritis of right knee  As above.    3. GERD with stricture  Will continue with omeprazole and take this preoperatively.    4. Benign prostatic hyperplasia with urinary hesitancy  No BPH symptoms after his TURP.  I do not anticipate any issues with urinary retention perioperatively.    5. S/P TURP  As above.    6. BRYN on CPAP  He will bring his CPAP with him to procedure.    7. Prediabetes  Most recent A1c was quite good.  Will recheck again today.  Continue lifestyle management only.  - Hemoglobin A1c; Future         - No identified additional risk factors other than previously addressed    Preoperative Medication Instructions  Antiplatelet or Anticoagulation Medication Instructions   - We reviewed the  medication list and the patient is not on an antiplatelet or anticoagulation medications.    Additional Medication Instructions   - Herbal medications and vitamins: DO NOT TAKE 14 days prior to surgery.    Morning of your procedure please take your omeprazole with a sip of water.  You may hold all other medications.    Recommendation  Approval given to proceed with proposed procedure, without further diagnostic evaluation.        Subjective   Khadar is a 80 year old, presenting for the following:  Pre-Op Exam (Pt is here for pre op exam for ahead of knee replacement on 8/5/25 with Dr. Sharan Jensen at [facility]. )          7/8/2025     1:13 PM   Additional Questions   Roomed by Manuel   Accompanied by alone         7/8/2025     1:13 PM   Patient Reported Additional Medications   Patient reports taking the following new medications none     HPI: Khadar has severe arthritis of the right knee and he is going to be undergoing right knee arthroplasty next month.  He is feeling well.  Remains very active with no cardiopulmonary symptoms.          7/4/2025   Pre-Op Questionnaire   Have you ever had a heart attack or stroke? No   Have you ever had surgery on your heart or blood vessels, such as a stent placement, a coronary artery bypass, or surgery on an artery in your head, neck, heart, or legs? No   Do you have chest pain with activity? No   Do you have a history of heart failure? No   Do you currently have a cold, bronchitis or symptoms of other infection? No   Do you have a cough, shortness of breath, or wheezing? No   Do you or anyone in your family have previous history of blood clots? No   Do you or does anyone in your family have a serious bleeding problem such as prolonged bleeding following surgeries or cuts? No   Have you ever had problems with anemia or been told to take iron pills? No   Have you had any abnormal blood loss such as black, tarry or bloody stools? No   Have you ever had a blood  transfusion? No   Are you willing to have a blood transfusion if it is medically needed before, during, or after your surgery? Yes   Have you or any of your relatives ever had problems with anesthesia? No   Do you have sleep apnea, excessive snoring or daytime drowsiness? (!) YES   Do you have a CPAP machine? Yes   Do you have any artifical heart valves or other implanted medical devices like a pacemaker, defibrillator, or continuous glucose monitor? No   Do you have artificial joints? No   Are you allergic to latex? No     Advance Care Planning    Document on file is a Health Care Directive or POLST.    Preoperative Review of    reviewed - no record of controlled substances prescribed.          Patient Active Problem List    Diagnosis Date Noted    S/P TURP 01/31/2023     Priority: Medium    Peripheral polyneuropathy 01/31/2023     Priority: Medium    Benign prostatic hyperplasia with urinary hesitancy 12/10/2019     Priority: Medium    Dyslipidemia      Priority: Medium     Created by Conversion        GERD with stricture      Priority: Medium     Created by Conversion        BRYN on CPAP      Priority: Medium     Created by Conversion  Replacement Utility updated for latest IMO load        Seborrheic Dermatitis      Priority: Medium     Created by Conversion        Seborrheic Keratosis      Priority: Medium     Created by Conversion        Nephrolithiasis      Priority: Medium     Created by Conversion  [facility] Annotation: Sep  9 2009 10:11AM - Neeta Ryan: 7/08   removed          Past Medical History:   Diagnosis Date    Benign prostatic hyperplasia with urinary hesitancy     BPH (benign prostatic hyperplasia)     Dyslipidemia     GERD with stricture     Nephrolithiasis     BRYN (obstructive sleep apnea)     cpap    Plantar fasciitis     Seborrheic dermatitis     Seborrheic keratosis     Trochanteric bursitis      Past Surgical History:   Procedure Laterality Date    CYSTOSCOPY, TRANSURETHRAL  "RESECTION (TUR) PROSTATE, COMBINED N/A 2022    Procedure: CYSTOSCOPY,  BIPOLAR TRANSURETHRAL RESECTION OF PROSTATE;  Surgeon: Shaan Grewal MD;  Location: [facility]      Description: Arthroscopy Knee Right;  Recorded: 2011;     Current Outpatient Medications   Medication Sig Dispense Refill    atorvastatin (LIPITOR) 10 MG tablet Take 1 tablet (10 mg) by mouth daily. 90 tablet 1    Calcium Citrate-Vitamin D (CALCIUM CITRATE +D PO) Take 1 tablet by mouth 2 times daily      Multiple Vitamins-Minerals (MULTIVITAMIN ADULT PO) Take 1 tablet by mouth daily      omeprazole (PRILOSEC) 20 MG DR capsule TAKE 1 CAPSULE BY MOUTH TWICE DAILY AS NEEDED (Patient taking differently: Take 20 mg by mouth daily.) 180 capsule 2    pilocarpine (SALAGEN) 5 MG tablet Take 1 tablet (5 mg) by mouth at bedtime. 90 tablet 3       Allergies   Allergen Reactions    Ciprofloxacin Dizziness    Sulfa Antibiotics Rash        Social History     Tobacco Use    Smoking status: Never    Smokeless tobacco: Never   Substance Use Topics    Alcohol use: No     Comment: Alcoholic Drinks/day: social 3 times per month     Family History   Problem Relation Age of Onset    Diabetes Mother     Breast Cancer Mother     Diabetes Father     Kidney Disease Father          age 52 nephropathy    Diabetes Sister     Cancer Brother         bladder     History   Drug Use No             Review of Systems  Constitutional, HEENT, cardiovascular, pulmonary, gi and gu systems are negative, except as otherwise noted.    Objective    /79 (BP Location: Left arm, Patient Position: Sitting, Cuff Size: Adult Regular)   Pulse 85   Temp 98.7  F (37.1  C) (Temporal)   Ht 1.753 m (5' 9\")   Wt 91 kg (200 lb 11.2 oz)   SpO2 96%   BMI 29.64 kg/m     Estimated body mass index is 29.64 kg/m  as calculated from the following:    Height as of this encounter: 1.753 m (5' 9\").    Weight as of this encounter: 91 kg (200 lb " 11.2 oz).  Physical Exam  GENERAL: alert and no distress  EYES: Eyes grossly normal to inspection, PERRL and conjunctivae and sclerae normal  HENT: ear canals and TM's normal, nose and mouth without ulcers or lesions  NECK: no adenopathy, no asymmetry, masses, or scars  RESP: lungs clear to auscultation - no rales, rhonchi or wheezes  CV: regular rate and rhythm, normal S1 S2, no S3 or S4, no murmur, click or rub, no peripheral edema  ABDOMEN: soft, nontender, no hepatosplenomegaly, no masses and bowel sounds normal  MS: no gross musculoskeletal defects noted, no edema  SKIN: no suspicious lesions or rashes  NEURO: Normal strength and tone, mentation intact and speech normal  PSYCH: mentation appears normal, affect normal/bright    Recent Labs   Lab Test 02/05/25  0956 01/13/25  1745 01/13/25  1235   HGB  --   --  15.0   PLT  --   --  185   NA  --   --  142   POTASSIUM  --   --  4.3   CR  --  1.2 1.05   A1C 5.9*  --   --         Diagnostics  Labs pending at this time.  Results will be reviewed when available.   EKG reveals sinus rhythm.  Possible old inferior wall MI.  No acute ST or T wave changes.  No change from previous.    Revised Cardiac Risk Index (RCRI)  The patient has the following serious cardiovascular risks for perioperative complications:   - No serious cardiac risks = 0 points     RCRI Interpretation: 0 points: Class I (very low risk - 0.4% complication rate)         Signed Electronically by: JULIANE DEJESUS MD  A copy of this evaluation report is provided to the requesting physician.          none

## 2025-07-09 ENCOUNTER — TELEPHONE (OUTPATIENT)
Dept: INTERNAL MEDICINE | Facility: CLINIC | Age: 80
End: 2025-07-09
Payer: COMMERCIAL

## 2025-07-09 LAB
ANION GAP SERPL CALCULATED.3IONS-SCNC: 12 MMOL/L (ref 7–15)
ATRIAL RATE - MUSE: 78 BPM
BUN SERPL-MCNC: 19.4 MG/DL (ref 8–23)
CALCIUM SERPL-MCNC: 9.5 MG/DL (ref 8.8–10.4)
CHLORIDE SERPL-SCNC: 107 MMOL/L (ref 98–107)
CREAT SERPL-MCNC: 1.12 MG/DL (ref 0.67–1.17)
DIASTOLIC BLOOD PRESSURE - MUSE: NORMAL MMHG
EGFRCR SERPLBLD CKD-EPI 2021: 67 ML/MIN/1.73M2
GLUCOSE SERPL-MCNC: 97 MG/DL (ref 70–99)
HCO3 SERPL-SCNC: 24 MMOL/L (ref 22–29)
INTERPRETATION ECG - MUSE: NORMAL
P AXIS - MUSE: 56 DEGREES
POTASSIUM SERPL-SCNC: 4.5 MMOL/L (ref 3.4–5.3)
PR INTERVAL - MUSE: 198 MS
QRS DURATION - MUSE: 94 MS
QT - MUSE: 376 MS
QTC - MUSE: 428 MS
R AXIS - MUSE: 21 DEGREES
SODIUM SERPL-SCNC: 143 MMOL/L (ref 135–145)
SYSTOLIC BLOOD PRESSURE - MUSE: NORMAL MMHG
T AXIS - MUSE: 43 DEGREES
VENTRICULAR RATE- MUSE: 78 BPM

## 2025-07-09 NOTE — TELEPHONE ENCOUNTER
July 9, 2025    Patient's pre-operative physical documentation and labs have been completed by Dr. Hawley.  The pre-operative paperwork was faxed to [facility] at [phone] per instructions from the surgeon.    Sahra Lovelace

## (undated) DEVICE — GOWN LG DISP 9515

## (undated) DEVICE — SOL WATER IRRIG 1000ML BOTTLE 2F7114

## (undated) DEVICE — TUBING SET THERMEDX UROLOGY SGL USE LL0006

## (undated) DEVICE — PREP SCRUB SOL EXIDINE 4% CHG 4OZ 29002-404

## (undated) DEVICE — SPONGE RAY-TEC 4X8" 7318

## (undated) DEVICE — SYR 50ML CATH TIP W/O NDL 309620

## (undated) DEVICE — GLOVE BIOGEL PI INDICATOR 8.0 LF 41680

## (undated) DEVICE — BAG URINARY DRAIN 4000ML LF 153509

## (undated) DEVICE — SUCTION MANIFOLD NEPTUNE 2 SYS 1 PORT 702-025-000

## (undated) DEVICE — CATH FOLEY 3WAY 22FR 30ML LUBRICATH LATEX 0167L22

## (undated) DEVICE — SYR 30ML LL W/O NDL 302832

## (undated) DEVICE — CUSTOM PACK CYSTO PREFERRED SOT5BCYHEA

## (undated) DEVICE — STRAP CATH LEG ADJUSTABLE 0814-8200

## (undated) DEVICE — SOLUTION IRRIG 2B7127 .9NS 3000ML BAG

## (undated) DEVICE — GLOVE BIOGEL PI ULTRATOUCH G SZ 7.5 42175

## (undated) DEVICE — EVACUATOR BLADDER UROVAC LATEX M0067301250

## (undated) DEVICE — TUBING SUCTION MEDI-VAC 1/4"X20' N620A - HE

## (undated) DEVICE — ESU ELEC LOOP HF-RESECTION 24FR MED 30 W/CABLE WA22606S

## (undated) RX ORDER — FENTANYL CITRATE 50 UG/ML
INJECTION, SOLUTION INTRAMUSCULAR; INTRAVENOUS
Status: DISPENSED
Start: 2022-11-29

## (undated) RX ORDER — PROPOFOL 10 MG/ML
INJECTION, EMULSION INTRAVENOUS
Status: DISPENSED
Start: 2022-11-29

## (undated) RX ORDER — DEXAMETHASONE SODIUM PHOSPHATE 10 MG/ML
INJECTION, SOLUTION INTRAMUSCULAR; INTRAVENOUS
Status: DISPENSED
Start: 2022-11-29